# Patient Record
Sex: MALE | Race: WHITE | NOT HISPANIC OR LATINO | Employment: FULL TIME | URBAN - METROPOLITAN AREA
[De-identification: names, ages, dates, MRNs, and addresses within clinical notes are randomized per-mention and may not be internally consistent; named-entity substitution may affect disease eponyms.]

---

## 2017-04-28 ENCOUNTER — TRANSCRIBE ORDERS (OUTPATIENT)
Dept: ADMINISTRATIVE | Facility: HOSPITAL | Age: 27
End: 2017-04-28

## 2017-04-28 DIAGNOSIS — M54.40 ACUTE RIGHT-SIDED LOW BACK PAIN WITH SCIATICA, SCIATICA LATERALITY UNSPECIFIED: Primary | ICD-10-CM

## 2017-05-05 ENCOUNTER — HOSPITAL ENCOUNTER (OUTPATIENT)
Dept: RADIOLOGY | Facility: HOSPITAL | Age: 27
Discharge: HOME | End: 2017-05-05
Payer: COMMERCIAL

## 2017-05-05 DIAGNOSIS — M54.40 ACUTE RIGHT-SIDED LOW BACK PAIN WITH SCIATICA, SCIATICA LATERALITY UNSPECIFIED: ICD-10-CM

## 2017-05-05 PROCEDURE — 72148 MRI LUMBAR SPINE W/O DYE: CPT

## 2017-08-17 ENCOUNTER — APPOINTMENT (EMERGENCY)
Dept: CT IMAGING | Facility: HOSPITAL | Age: 27
End: 2017-08-17
Payer: COMMERCIAL

## 2017-08-17 ENCOUNTER — HOSPITAL ENCOUNTER (EMERGENCY)
Facility: HOSPITAL | Age: 27
Discharge: HOME | End: 2017-08-17
Attending: EMERGENCY MEDICINE
Payer: COMMERCIAL

## 2017-08-17 ENCOUNTER — APPOINTMENT (EMERGENCY)
Dept: RADIOLOGY | Facility: HOSPITAL | Age: 27
End: 2017-08-17
Payer: COMMERCIAL

## 2017-08-17 VITALS
SYSTOLIC BLOOD PRESSURE: 148 MMHG | WEIGHT: 284.17 LBS | TEMPERATURE: 98 F | DIASTOLIC BLOOD PRESSURE: 100 MMHG | OXYGEN SATURATION: 98 % | HEART RATE: 99 BPM | RESPIRATION RATE: 18 BRPM

## 2017-08-17 DIAGNOSIS — S16.1XXA ACUTE STRAIN OF NECK MUSCLE, INITIAL ENCOUNTER: ICD-10-CM

## 2017-08-17 DIAGNOSIS — S39.012A BACK STRAIN, INITIAL ENCOUNTER: ICD-10-CM
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
## 2017-08-17 PROCEDURE — 72100 X-RAY EXAM L-S SPINE 2/3 VWS: CPT

## 2017-08-17 PROCEDURE — 70450 CT HEAD/BRAIN W/O DYE: CPT

## 2017-08-17 PROCEDURE — 72131 CT LUMBAR SPINE W/O DYE: CPT

## 2017-08-17 PROCEDURE — 99284 EMERGENCY DEPT VISIT MOD MDM: CPT

## 2017-08-17 PROCEDURE — 72125 CT NECK SPINE W/O DYE: CPT

## 2017-08-17 RX ORDER — TADALAFIL 2.5 MG/1
2.5 TABLET ORAL DAILY PRN
COMMUNITY
End: 2021-07-22

## 2017-08-17 RX ORDER — OMEGA-3-ACID ETHYL ESTERS 1 G/1
2 CAPSULE, LIQUID FILLED ORAL 2 TIMES DAILY
COMMUNITY
End: 2021-07-22

## 2017-08-17 RX ORDER — MULTIVITAMIN
1 TABLET ORAL DAILY
COMMUNITY

## 2017-08-17 RX ORDER — HYDROCODONE BITARTRATE AND ACETAMINOPHEN 5; 325 MG/1; MG/1
1 TABLET ORAL EVERY 6 HOURS PRN
Qty: 10 TABLET | Refills: 0 | Status: SHIPPED | OUTPATIENT
Start: 2017-08-17 | End: 2021-07-22

## 2017-08-17 RX ORDER — NAPROXEN 500 MG/1
500 TABLET ORAL 2 TIMES DAILY WITH MEALS
Qty: 30 TABLET | Refills: 0 | Status: SHIPPED | OUTPATIENT
Start: 2017-08-17 | End: 2021-07-22

## 2021-06-30 ENCOUNTER — OFFICE VISIT (OUTPATIENT)
Dept: URGENT CARE | Facility: MEDICAL CENTER | Age: 31
End: 2021-06-30
Payer: COMMERCIAL

## 2021-06-30 VITALS — OXYGEN SATURATION: 99 % | HEART RATE: 96 BPM | TEMPERATURE: 97.8 F | RESPIRATION RATE: 18 BRPM

## 2021-06-30 DIAGNOSIS — H66.91 RIGHT OTITIS MEDIA, UNSPECIFIED OTITIS MEDIA TYPE: Primary | ICD-10-CM

## 2021-06-30 PROCEDURE — 99213 OFFICE O/P EST LOW 20 MIN: CPT | Performed by: PHYSICIAN ASSISTANT

## 2021-06-30 RX ORDER — AMOXICILLIN 500 MG/1
500 CAPSULE ORAL EVERY 8 HOURS SCHEDULED
Qty: 21 CAPSULE | Refills: 0 | Status: SHIPPED | OUTPATIENT
Start: 2021-06-30 | End: 2021-07-07

## 2021-06-30 NOTE — PROGRESS NOTES
[facility]        NAME: Conor Marcus is a 33 y o  male  : 1990    MRN: [id number]  DATE: 2021  TIME: 1:49 PM    Assessment and Plan   Right otitis media, unspecified otitis media type [H66 91]  1  Right otitis media, unspecified otitis media type  amoxicillin (AMOXIL) 500 mg capsule         Patient Instructions     Otitis media  Amoxicillin as directed  Follow up with PCP in 3-5 days  Proceed to  ER if symptoms worsen  Chief Complaint     Chief Complaint   Patient presents with    Earache     x2 days rigth ear    Nasal Congestion     x 2 days taking mucinex for same          History of Present Illness       34 y/o female presents c/o congestion, post nasal drip and right ear pain  Denies cough, chest pain, fever, chills      Review of Systems   Review of Systems   Constitutional: Negative  HENT: Positive for congestion and ear pain  Eyes: Negative  Respiratory: Negative  Negative for apnea, cough, choking, chest tightness, shortness of breath, wheezing and stridor  Cardiovascular: Negative  Negative for chest pain           Current Medications       Current Outpatient Medications:     amoxicillin (AMOXIL) 500 mg capsule, Take 1 capsule (500 mg total) by mouth every 8 (eight) hours for 7 days, Disp: 21 capsule, Rfl: 0    HYDROcodone-acetaminophen (NORCO) 5-325 mg per tablet, Take 1 tablet by mouth every 6 (six) hours as needed for pain Max Daily Amount: 4 tablets (Patient not taking: Reported on 2021), Disp: 10 tablet, Rfl: 0    Multiple Vitamin (MULTIVITAMIN) tablet, Take 1 tablet by mouth daily (Patient not taking: Reported on 2021), Disp: , Rfl:     naproxen (NAPROSYN) 500 mg tablet, Take 1 tablet by mouth 2 (two) times a day with meals (Patient not taking: Reported on 2021), Disp: 30 tablet, Rfl: 0    omega-3-acid ethyl esters (LOVAZA) 1 g capsule, Take 2 g by mouth 2 (two) times a day (Patient not taking: Reported on 2021), Disp: , Rfl:    tadalafil (CIALIS) 2 5 MG tablet, Take 2 5 mg by mouth daily as needed for erectile dysfunction (Patient not taking: Reported on 6/30/2021), Disp: , Rfl:     UNKNOWN TO PATIENT, , Disp: , Rfl:     Current Allergies     Allergies as of 06/30/2021    (No Known Allergies)            The following portions of the patient's history were reviewed and updated as appropriate: allergies, current medications, past family history, past medical history, past social history, past surgical history and problem list      Past Medical History:   Diagnosis Date    Back pain     Hyperlipidemia     Sciatica     "left side"       Past Surgical History:   Procedure Laterality Date    NO PAST SURGERIES         No family history on file  Medications have been verified  Objective   Pulse 96   Temp 97 8 °F (36 6 °C)   Resp 18   SpO2 99%        Physical Exam     Physical Exam  Constitutional:       General: He is not in acute distress  Appearance: He is well-developed  He is not diaphoretic  HENT:      Head: Normocephalic and atraumatic  Right Ear: Hearing and external ear normal  Tympanic membrane is erythematous and bulging  Left Ear: Hearing, tympanic membrane, ear canal and external ear normal    Cardiovascular:      Rate and Rhythm: Normal rate and regular rhythm  Heart sounds: Normal heart sounds  Pulmonary:      Effort: Pulmonary effort is normal  No respiratory distress  Breath sounds: Normal breath sounds  No wheezing or rales  Chest:      Chest wall: No tenderness  Musculoskeletal:      Cervical back: Normal range of motion and neck supple  Lymphadenopathy:      Cervical: No cervical adenopathy

## 2021-06-30 NOTE — PATIENT INSTRUCTIONS
Otitis media  Amoxicillin as directed  Follow up with PCP in 3-5 days  Proceed to  ER if symptoms worsen  Ear Infection   WHAT YOU NEED TO KNOW:   An ear infection is also called otitis media  An ear infection may be caused by blocked or swollen eustachian tubes  Eustachian tubes connect the middle ear to the back of the nose and throat  They drain fluid from the middle ear  With an ear infection, fluid builds up and is infected by germs  The germs grow easily in fluid trapped behind the eardrum  DISCHARGE INSTRUCTIONS:   Call 911 or have someone call 911 for the following:   · You have a seizure  Return to the emergency department if:   · You have a fever and a stiff neck  Contact your healthcare provider if:   · Your ear pain gets worse or does not go away, even after treatment  · The outside of your ear is red or swollen  · You are vomiting or have diarrhea  · You have fluid coming from your ear  · You have questions or concerns about your condition or care  Medicines: You may  need any of the following:  · Acetaminophen  decreases pain and fever  It is available without a doctor's order  Ask how much to take and how often to take it  Follow directions  Read the labels of all other medicines you are using to see if they also contain acetaminophen, or ask your doctor or pharmacist  Acetaminophen can cause liver damage if not taken correctly  Do not use more than 4 grams (4,000 milligrams) total of acetaminophen in one day  · NSAIDs , such as ibuprofen, help decrease swelling, pain, and fever  This medicine is available with or without a doctor's order  NSAIDs can cause stomach bleeding or kidney problems in certain people  If you take blood thinner medicine, always ask your healthcare provider if NSAIDs are safe for you  Always read the medicine label and follow directions  · Ear drops  help treat your ear pain      · Antibiotics  help treat a bacterial infection that caused your ear infection  · Take your medicine as directed  Contact your healthcare provider if you think your medicine is not helping or if you have side effects  Tell him or her if you are allergic to any medicine  Keep a list of the medicines, vitamins, and herbs you take  Include the amounts, and when and why you take them  Bring the list or the pill bottles to follow-up visits  Carry your medicine list with you in case of an emergency  Heat or ice:   · Apply heat  on your ear for 15 to 20 minutes, 3 to 4 times a day or as directed  Heat helps decrease pain  · Apply ice  on your ear for 15 to 20 minutes, 3 to 4 times a day for 2 days or as directed  Use an ice pack, or put crushed ice in a plastic bag  Cover it with a towel before you apply it to your ear  Ice decreases swelling and pain  Prevent an ear infection:   · Wash your hands often  Use soap and water  Wash your hands after you use the bathroom, change a child's diapers, or sneeze  Wash your hands before you prepare or eat food  · Stay away from people who are ill  Some germs are easily and quickly spread through contact  Return to work or school: You may return to work or school when your fever is gone  Follow up with your healthcare provider as directed:  Write down your questions so you remember to ask them during your visits  © Copyright [address] Information is for End User's use only and may not be sold, redistributed or otherwise used for commercial purposes  All illustrations and images included in CareNotes® are the copyrighted property of A D A M , Inc  or [facility] Pranay Zuñiga   The above information is an  only  It is not intended as medical advice for individual conditions or treatments  Talk to your doctor, nurse or pharmacist before following any medical regimen to see if it is safe and effective for you

## 2021-07-08 ENCOUNTER — OFFICE VISIT (OUTPATIENT)
Dept: URGENT CARE | Facility: MEDICAL CENTER | Age: 31
End: 2021-07-08
Payer: COMMERCIAL

## 2021-07-08 VITALS
RESPIRATION RATE: 18 BRPM | WEIGHT: 305 LBS | SYSTOLIC BLOOD PRESSURE: 136 MMHG | DIASTOLIC BLOOD PRESSURE: 76 MMHG | OXYGEN SATURATION: 97 % | TEMPERATURE: 98.3 F | HEART RATE: 96 BPM | BODY MASS INDEX: 37.14 KG/M2 | HEIGHT: 76 IN

## 2021-07-08 DIAGNOSIS — H69.81 DYSFUNCTION OF RIGHT EUSTACHIAN TUBE: Primary | ICD-10-CM

## 2021-07-08 DIAGNOSIS — R09.81 NASAL CONGESTION: ICD-10-CM

## 2021-07-08 PROCEDURE — 99213 OFFICE O/P EST LOW 20 MIN: CPT | Performed by: PHYSICIAN ASSISTANT

## 2021-07-08 RX ORDER — MONTELUKAST SODIUM 10 MG/1
10 TABLET ORAL
Qty: 14 TABLET | Refills: 0 | Status: SHIPPED | OUTPATIENT
Start: 2021-07-08 | End: 2021-08-11

## 2021-07-08 NOTE — PATIENT INSTRUCTIONS
1  Increase fluids  2  Tylenol as needed for ear pain  3  Take Singulair 10mg  At night  4   Follow up with PCP in 3-5 days if symptoms persist

## 2021-07-08 NOTE — PROGRESS NOTES
[facility]        NAME: Katalina Sutton is a 33 y o  male  : 1990    MRN: [id number]  DATE: 2021  TIME: 4:41 PM    Assessment and Plan   Dysfunction of right eustachian tube [H69 81]  1  Dysfunction of right eustachian tube  montelukast (SINGULAIR) 10 mg tablet   2  Nasal congestion  montelukast (SINGULAIR) 10 mg tablet         Patient Instructions     1  Increase fluids  2  Tylenol as needed for ear pain  3  Take Singulair 10mg  At night  4  Follow up with PCP in 3-5 days if symptoms persist       Chief Complaint     Chief Complaint   Patient presents with    Earache     b/l ear pain x 1 week          History of Present Illness       Claudette Amato is a 56-year-old male presents with right-sided ear pain and fullness has been present for the past 2 weeks  Patient reports he was seen and evaluated for a right otitis media approximately 10 days prior, he finished the entire course of amoxicillin but still had pressure and sense of fullness in his right ear  Patient denies any change in hearing or ear drainage  He has had no fever since the onset of his most recent symptoms  Review of Systems   Review of Systems   Constitutional: Negative  HENT: Positive for congestion and ear pain  Negative for ear discharge and hearing loss  Respiratory: Negative  Gastrointestinal: Negative            Current Medications       Current Outpatient Medications:     Multiple Vitamin (MULTIVITAMIN) tablet, Take 1 tablet by mouth daily , Disp: , Rfl:     naproxen (NAPROSYN) 500 mg tablet, Take 1 tablet by mouth 2 (two) times a day with meals, Disp: 30 tablet, Rfl: 0    omega-3-acid ethyl esters (LOVAZA) 1 g capsule, Take 2 g by mouth 2 (two) times a day , Disp: , Rfl:     HYDROcodone-acetaminophen (NORCO) 5-325 mg per tablet, Take 1 tablet by mouth every 6 (six) hours as needed for pain Max Daily Amount: 4 tablets (Patient not taking: Reported on 2021), Disp: 10 tablet, Rfl: 0    montelukast (SINGULAIR) 10 mg tablet, Take 1 tablet (10 mg total) by mouth daily at bedtime for 14 days, Disp: 14 tablet, Rfl: 0    tadalafil (CIALIS) 2 5 MG tablet, Take 2 5 mg by mouth daily as needed for erectile dysfunction  (Patient not taking: Reported on 7/8/2021), Disp: , Rfl:     UNKNOWN TO PATIENT, , Disp: , Rfl:     Current Allergies     Allergies as of 07/08/2021    (No Known Allergies)            The following portions of the patient's history were reviewed and updated as appropriate: allergies, current medications, past family history, past medical history, past social history, past surgical history and problem list      Past Medical History:   Diagnosis Date    Back pain     Hyperlipidemia     Sciatica     "left side"       Past Surgical History:   Procedure Laterality Date    NO PAST SURGERIES         History reviewed  No pertinent family history  Medications have been verified  Objective   /76   Pulse 96   Temp 98 3 °F (36 8 °C)   Resp 18   Ht 6' 4" (1 93 m)   Wt (!) 138 kg (305 lb)   SpO2 97%   BMI 37 13 kg/m²   No LMP for male patient  Physical Exam     Physical Exam  Constitutional:       General: He is not in acute distress  Appearance: Normal appearance  He is not ill-appearing  HENT:      Head: Normocephalic and atraumatic  Left Ear: Tympanic membrane and ear canal normal       Ears:        Nose: Mucosal edema and congestion present  No rhinorrhea  Mouth/Throat:      Lips: Pink  Pharynx: Oropharynx is clear  Cardiovascular:      Rate and Rhythm: Normal rate and regular rhythm  Heart sounds: Normal heart sounds  No murmur heard  Pulmonary:      Effort: Pulmonary effort is normal       Breath sounds: Normal breath sounds  Neurological:      Mental Status: He is alert           Discuss patient history and physical exam findings, patient declines use of nasal sprays or prednisone and request "alternative "

## 2021-07-21 NOTE — PROGRESS NOTES
Assessment/Plan:  Problem List Items Addressed This Visit        Other    Hypertriglyceridemia     Pending labs  Recommend lifestyle modifications  Relevant Orders    Lipid panel    TSH, 3rd generation with Free T4 reflex    Class 2 severe obesity with serious comorbidity and body mass index (BMI) of 37 0 to 37 9 in adult [facility])     Recommend lifestyle modifications  Relevant Orders    TSH, 3rd generation with Free T4 reflex    Anxiety       Stable  Patient declines mood medications and counseling  Smart phone elinor list and counseling list provided today  Relevant Orders    CBC and differential    Comprehensive metabolic panel    TSH, 3rd generation with Free T4 reflex      Other Visit Diagnoses     Annual physical exam    -  Primary    Obesity (BMI 35 0-39 9 without comorbidity)        Relevant Orders    TSH, 3rd generation with Free T4 reflex    Encounter for immunization        Need for hepatitis C screening test        Relevant Orders    Hepatitis C antibody    Screening for HIV (human immunodeficiency virus)        Relevant Orders    HIV 1/2 Antigen/Antibody (4th Generation) w Reflex [facility]    Screening for cardiovascular condition        Relevant Orders    CBC and differential    Comprehensive metabolic panel    Lipid panel    LDL cholesterol, direct    BMI 37 0-37 9, adult        Relevant Orders    TSH, 3rd generation with Free T4 reflex    Myalgia        Relevant Orders    Vitamin D 25 hydroxy    Thyroid disorder screen        Relevant Orders    TSH, 3rd generation with Free T4 reflex    Dysfunction of right eustachian tube       Advise Papa med sinus rinse kit, Mucinex, Claritin/Zyrtec/Allegra, Flonase  Avoid decongestants if you have high blood pressure  Return in about 1 year (around 7/22/2022) for Annual physical; P'RN Labs        Future Appointments   Date Time Provider Nora Moody   7/25/2022  3:00 PM Ari Carlin DO [facility] Subjective:     Yumiko Lewis is a 33 y o  male who presents today as a new patient for his medical conditions  New Patient    Previous PCP:  Dr Mccloud Done at [facility] in [place], [place]  Reason for Transfer:  Preference, Girlfriend and Son are patients here  Last seen by previous PCP:  2017  Last Labs:  2018  Last Physical:  DOT   Medical Records Requested:  Yes - Immunizations      HPI:  Chief Complaint   Patient presents with    Annual Exam     blood work      -- Above per clinical staff and reviewed  --      HPI      Today:      Controlled Substance Review    [place] PDMP or [place]  reviewed: No red flags were identified; safe to proceed with prescription  S/p Right OM 3 weeks ago, Rx Amoxicillin  He still feels that his ear is wet  Rx Singulair for possible Eustachian tube dysfunction? Obesity - Not watching diet  No regular exercise  Hypertriglyceridemia - On Fish Oil 1200mg 2 tabs daily  No other Rx previously  Back Pain - Sees Chiro regularly  Smokes marijuana occasionally for pain relief  Hemorrhoids - s/p Colonoscopy in  in [place], [place] ([address] name unknown)  Patient states repeat colonoscopy due at age 37yo  Anxiety  - Feels like he's coping well  Stressed financially, also caring for young son  Sometimes yells  Feels safe at home  Good social supports  No SI/HI/AH/VH  Last counseling 2018 - helpful  No mood meds previously  "I will not even attempt to be on medications for mood "        Reviewed:  Labs - None    Overdue for Dentist   [facility]      PHQ-9 Depression Screening    PHQ-9:   Frequency of the following problems over the past two weeks:      Little interest or pleasure in doing things: 0 - not at all  Feeling down, depressed, or hopeless: 0 - not at all  Trouble falling or staying asleep, or sleeping too much: 0 - not at all  Feeling tired or having little energy: 0 - not at all  Poor appetite or overeatin - not at all  Feeling bad about yourself - or that you are a failure or have let yourself or your family down: 0 - not at all  Trouble concentrating on things, such as reading the newspaper or watching television: 0 - not at all  Moving or speaking so slowly that other people could have noticed  Or the opposite - being so fidgety or restless that you have been moving around a lot more than usual: 0 - not at all  Thoughts that you would be better off dead, or of hurting yourself in some way: 0 - not at all  PHQ-2 Score: 0  PHQ-9 Score: 0         BRUNO-7 Flowsheet Screening      Most Recent Value   Over the last 2 weeks, how often have you been bothered by any of the following problems? Feeling nervous, anxious, or on edge  0   Not being able to stop or control worrying  0   Worrying too much about different things  1   Trouble relaxing  1   Being so restless that it is hard to sit still  0   Becoming easily annoyed or irritable  1   Feeling afraid as if something awful might happen  0   BRUNO-7 Total Score  3          MDQ:  1        Last Tetanus vaccine at [facility] [address]  - 7 in the last 10 years - [id number]  The following portions of the patient's history were reviewed and updated as appropriate: allergies, current medications, past family history, past medical history, past social history, past surgical history and problem list       Review of Systems   Constitutional: Positive for fatigue  Negative for appetite change, chills, diaphoresis and fever  Respiratory: Negative for cough, chest tightness, shortness of breath and wheezing  Cardiovascular: Positive for chest pain (B/L since quitting smoking, improving c time)  Gastrointestinal: Positive for blood in stool (Last occurred 1 week ago, +Hemorrhoids)  Negative for abdominal pain, diarrhea, nausea and vomiting  Genitourinary: Negative for dysuria  Musculoskeletal: Positive for back pain          Current Outpatient Medications   Medication Sig Dispense Refill    montelukast (SINGULAIR) 10 mg tablet Take 1 tablet (10 mg total) by mouth daily at bedtime for 14 days 14 tablet 0    Multiple Vitamin (MULTIVITAMIN) tablet Take 1 tablet by mouth daily       niacin 100 mg tablet Take 100 mg by mouth daily with breakfast      Omega-3 Fatty Acids (Fish Oil) 1200 MG CAPS Take 2 capsules by mouth daily       No current facility-administered medications for this visit  Objective:  /78   Pulse 68   Temp 97 5 °F (36 4 °C)   Resp 14   Ht 6' 4" (1 93 m)   Wt (!) 138 kg (305 lb 3 2 oz)   SpO2 99%   BMI 37 15 kg/m²    Wt Readings from Last 3 Encounters:   07/22/21 (!) 138 kg (305 lb 3 2 oz)   07/08/21 (!) 138 kg (305 lb)   08/17/17 129 kg (284 lb 2 8 oz)      BP Readings from Last 3 Encounters:   07/22/21 120/78   07/08/21 136/76   08/17/17 148/100          Physical Exam  Vitals and nursing note reviewed  Constitutional:       Appearance: Normal appearance  He is well-developed  He is obese  HENT:      Head: Normocephalic and atraumatic  Right Ear: Tympanic membrane, ear canal and external ear normal       Left Ear: Tympanic membrane, ear canal and external ear normal       Nose: Nose normal       Right Sinus: No maxillary sinus tenderness or frontal sinus tenderness  Left Sinus: No maxillary sinus tenderness or frontal sinus tenderness  Mouth/Throat:      Mouth: Mucous membranes are moist       Pharynx: Oropharynx is clear  Uvula midline  Tonsils: No tonsillar exudate  Eyes:      Extraocular Movements: Extraocular movements intact  Conjunctiva/sclera: Conjunctivae normal       Pupils: Pupils are equal, round, and reactive to light  Cardiovascular:      Rate and Rhythm: Normal rate and regular rhythm  Pulses: Normal pulses  Heart sounds: Normal heart sounds  Pulmonary:      Effort: Pulmonary effort is normal       Breath sounds: Normal breath sounds     Abdominal:      General: Bowel sounds are normal  There is no distension  Palpations: Abdomen is soft  There is no mass  Tenderness: There is no abdominal tenderness  There is no guarding or rebound  Musculoskeletal:         General: No swelling or tenderness  Cervical back: Neck supple  Right lower leg: No edema  Left lower leg: No edema  Lymphadenopathy:      Cervical: No cervical adenopathy  Skin:     Findings: No rash  Neurological:      General: No focal deficit present  Mental Status: He is alert and oriented to person, place, and time  Psychiatric:         Mood and Affect: Mood normal          Behavior: Behavior normal          Thought Content: Thought content normal          Judgment: Judgment normal          Lab Results:      No results found for: WBC, HGB, HCT, PLT, CHOL, TRIG, HDL, LDLDIRECT, ALT, AST, NA, K, CL, CREATININE, BUN, CO2, TSH, PSA, INR, GLUF, HGBA1C, MICROALBUR  No results found for: URICACID  Invalid input(s): BASENAME Vitamin D    No results found  POCT Labs      BMI Counseling: Body mass index is 37 15 kg/m²  The BMI is above normal  Nutrition recommendations include encouraging healthy choices of fruits and vegetables  Exercise recommendations include exercising 3-5 times per week  No pharmacotherapy was ordered

## 2021-07-22 ENCOUNTER — OFFICE VISIT (OUTPATIENT)
Dept: FAMILY MEDICINE CLINIC | Facility: CLINIC | Age: 31
End: 2021-07-22
Payer: COMMERCIAL

## 2021-07-22 VITALS
TEMPERATURE: 97.5 F | SYSTOLIC BLOOD PRESSURE: 120 MMHG | BODY MASS INDEX: 37.17 KG/M2 | OXYGEN SATURATION: 99 % | HEART RATE: 68 BPM | RESPIRATION RATE: 14 BRPM | WEIGHT: 305.2 LBS | HEIGHT: 76 IN | DIASTOLIC BLOOD PRESSURE: 78 MMHG

## 2021-07-22 DIAGNOSIS — E78.1 HYPERTRIGLYCERIDEMIA: ICD-10-CM

## 2021-07-22 DIAGNOSIS — M79.10 MYALGIA: ICD-10-CM

## 2021-07-22 DIAGNOSIS — Z11.4 SCREENING FOR HIV (HUMAN IMMUNODEFICIENCY VIRUS): ICD-10-CM

## 2021-07-22 DIAGNOSIS — Z11.59 NEED FOR HEPATITIS C SCREENING TEST: ICD-10-CM

## 2021-07-22 DIAGNOSIS — Z13.29 THYROID DISORDER SCREEN: ICD-10-CM

## 2021-07-22 DIAGNOSIS — E66.9 OBESITY (BMI 35.0-39.9 WITHOUT COMORBIDITY): ICD-10-CM

## 2021-07-22 DIAGNOSIS — F41.9 ANXIETY: ICD-10-CM

## 2021-07-22 DIAGNOSIS — Z00.00 ANNUAL PHYSICAL EXAM: Primary | ICD-10-CM

## 2021-07-22 DIAGNOSIS — Z23 ENCOUNTER FOR IMMUNIZATION: ICD-10-CM

## 2021-07-22 DIAGNOSIS — Z13.6 SCREENING FOR CARDIOVASCULAR CONDITION: ICD-10-CM

## 2021-07-22 DIAGNOSIS — E66.01 CLASS 2 SEVERE OBESITY WITH SERIOUS COMORBIDITY AND BODY MASS INDEX (BMI) OF 37.0 TO 37.9 IN ADULT, UNSPECIFIED OBESITY TYPE (HCC): ICD-10-CM

## 2021-07-22 DIAGNOSIS — H69.81 DYSFUNCTION OF RIGHT EUSTACHIAN TUBE: ICD-10-CM

## 2021-07-22 PROCEDURE — 3008F BODY MASS INDEX DOCD: CPT | Performed by: FAMILY MEDICINE

## 2021-07-22 PROCEDURE — 1036F TOBACCO NON-USER: CPT | Performed by: FAMILY MEDICINE

## 2021-07-22 PROCEDURE — 99385 PREV VISIT NEW AGE 18-39: CPT | Performed by: FAMILY MEDICINE

## 2021-07-22 PROCEDURE — 3725F SCREEN DEPRESSION PERFORMED: CPT | Performed by: FAMILY MEDICINE

## 2021-07-22 RX ORDER — AMOXICILLIN 500 MG
2 CAPSULE ORAL 2 TIMES DAILY
COMMUNITY
End: 2021-10-01

## 2021-07-22 RX ORDER — NIACIN 100 MG
250 TABLET ORAL
COMMUNITY
End: 2021-10-01

## 2021-07-22 NOTE — PATIENT INSTRUCTIONS
Please contact your insurance if you are uncertain of coverage for plan of care items  Your insurance may not cover the cost of your Vitamin D blood test, which is approximately $65-70  Please notify the lab prior to blood draw if you would like to decline this test       Here is a web site to go to for information on providers who certify medical marijuana:  [email or web address]    M  Pham Malagon MD - [facility], [address] [facility], [place], [address]   800 [facility], [address]   800 [facility], [facility]  [place], [address]    Refer to the back of insurance card for counseling options  Apps and Websites for Counseling, Anxiety/Depression, Chronic Pain, Lifestyle Change, Problem-solving, Self-Esteem, Anger Management, Coping with Uncertainty    (Prices current as of 9/5/19)    1  Mood Kit - Available in Apple Sharita Store for $4 99  Supports a person's success in specific situations, includes thought , mood tracker, and journal   Can be accessed in an unstructured way and used as an unguided self-help sharita  2   Moodnotes - Available in [employer] Shar[employer] Store for $4 99  Focuses on healthier thinking habits and identifying "traps" in thinking  Tracks mood over a period of time and identifies factors that influence mood  3   MoodMission - Available in [facility] for free  Includes five "missions" to promote confidence in handling stressors and coping in specific situations  The sharita personalizes its style and techniques based on when the user engages it most frequently  In-sharita rewards are used to motivate, increase fun and self-confidence  Helpful for patients who need improvement in mood or a decrease in anxiety and depression symptoms  4    What's Up - Available in Share Some Style for free    Recognizes negative thinking patterns and methods to overcome them  Uses helpful metaphors, a catastrophe scale, grounding techniques, and breathing exercises  Has the ability to sync data across multiple devices and protect this information with a unique pass code  Has the capability to be active in forums where people can discuss similar feelings and strategies that have been helpful  5   Moodpath - Available in [employer] for free  Uses daily screenings to create a better understanding of thoughts, feelings, and emotions  When needed, it provides a discussion guide to talking with a medical professional based on the responses to daily screenings  Includes over 150 psychological exercises and videos to promote and strengthen overall mental health  6   MindShift - Available in [employer] for [address] [employer]  Helpful for youth and young adults in coping with anxiety  Creates an individualized toolbox to help users deal with test anxiety, perfectionism, social anxiety, worry, panic, and conflict  Includes directions on how to construct belief experiments to trial common beliefs that feed anxiety, guided relaxation, as well as tools and tips to help establish and accomplish goals  Differentiates between helpful and unhelpful anxiety, and explains how to overcome fears by gradually facing them in manageable steps  7   CBT-I  - Available in [employer] for free  For insomnia  Educates about sleep, developing positive sleep routines, and improved sleep environment  Encourages user to change behaviors which will provide confidence for better sleep on a regular basis  8   [employer]elfhelp  co  - Free website  Provides self-help and therapy resources that encourages change to combat negative and destructive thought patterns    Includes access to numerous handouts on a variety of symptoms related to anxiety, depression, low self-esteem, panic attacks, social disorders, and more   The solution section has interventions that can be printed and saved for future use  9   Woebot - Available in [employer] and [employer] for free  Recommended for age 16+  Friendly self-care  that helps you think through situations with step-by-step guidance using counseling tools, learn about yourself with intelligent mood tracking, and master skills to reduce stress and live happier through 100+ evidence-based stories from clinicians  Chat as often or as little as you'd like, whenever you'd like to  10   Giorgi DILL  CBT DBT Chatbot - Available in Apple appsstore and Google Play for free, has in-sharita purchases  Recommended for 12+  Psychologist support at $30/month, 50% off to start  April Nathan / Sergio Duron is free  Uses techniques of CBT, DBT, yoga, and meditation to support your needs regarding stress, anxiety, sleep, loss, and a full range of other mental health and wellness issues  11   Curable Pain Relief - Available in Apple Sharita store and Google Play for free, has in-sharita purchases  Teaches about the chronic pain cycle and how to reverse it, while retraining your brain and nervous system for long-term results  Smart  Nely guides user through updates in pain science to identify, target, eliminate the factors that are keeping user "stuck" in the pain cycle  12   Talkspace Online Therapy - Available in Apple Sharita store and Google Play for a fee  Subscription service, starting at $59/week (billed monthly)  All plans include unlimited messaging, and add live video sessions if desired  Unlimited messaging therapy for teens ages 15-14 and special services for couples therapy  You can change therapists or stop subscription renewal at any time  Recommended for 13+  User is matched with a licensed therapist in your state and communicate on your device by text, audio, and video from anywhere, at any time  User will hear back at least once a day, 5 days per week  Counseling services:    [facility]   [address], [address] , [place], [address] (they have equine therapy too)  8 [facility], [facility],  [place], 120 [facility] 242, [address],  [address], [address]    70 [facility], [address] [place] [address]   [phone]     1234 [facility]  200 [facility], [address]  [facility] 49    162 [facility]   [address], [place], [address]     1200 [facility]   2102 [facility], [facility] [address] [place] [address]     [facility], [facility], [facility]  (patients age 11-adult)   [address], 243 Health system, [address], [address]   [address]  1000 [facility], 1777 [facility] [address] [place] [address], [facility], [place], [address]  [address] [facility] 108 (specializing in addiction)  [facility], [address]  1441 [facility]  291 [facility], [place], 6100 [facility]   [place]      [address], 403 [facility] , [address], [place], [address] [facility] [place], [address] [facility] [address], 2121 [facility]  100 [facility], [address], [place], [address]  [address] [address], 2601 [facility], [address]   [phone]    Norm Fails, [address]  [place] [address] [place] [address], [place] [address] [phone]      Hotlines:   Please program these numbers into your phone in case you or someone you know needs them  All services are free  WarmLine:  637.314.4934 or [phone]   They provide a supportive listening ear if you need it  They also can also provide information about mental health concerns and services     Crisis Line:  [facility] [phone],  [facility] [phone], 250 [facility], [place]: [phone]   24/7 crisis counseling, on-site counseling and walk-in counseling services available  National Suicide Prevention Lifeline:  3-155-537-434.508.5795  En 1200 [facility] [phone]   This is free, confidential, and available 24/7  [place]: [phone]   For those facing or having survived abuse 24 hour confidential help line, emergency safe house, counseling, advocacy and education  Crisis Text Line: text 906961     You are connected to a Crisis Counselor to bring a hot moment to a cool calm through active listening and collaborative problem solving  If you or someone your know are feeling as though you are going to hurt yourself, do not wait - GET HELP RIGHT AWAY  Go to the closest Emergency Room, call 911, or call someone you trust, family member or friend to be with you until you can get some help  Amanuel Dill, -       Thank you for notifying us regarding your interest in the Covid-19 vaccine  At this time, until vaccine supply ramps up, [facility] is currently only scheduling vaccines for healthcare workers, first responders and individuals ages 76 and older to ensure we have adequate vaccine supply for these high-risk groups  In an effort to give you the tools to help you, your family, and loved ones who are asking for this information, and/or seeking assistance, we highly encourage you to take either step below to help pre-register for the Covid-19 vaccine:     1  If you do not have a Ipsum account, visit [facility]/vaccine and sign-up, and then complete the brief questionnaire within [address]  Anyone, of any age, may pre-register on Ipsum  OR    2  If you are unable to use Ipsum, please call [phone], option [phone], and follow the prompts    Note: Due to high call volumes, we are hoping to reserve the use of the call-in option for individuals 75+ at this time   Please encourage your loved ones to use MyChart  Need further assistance? Visit:  Odell pizano       Thank you,       95 [facility] Visit for Adults   AMBULATORY CARE:   A wellness visit  is when you see your healthcare provider to get screened for health problems  Your healthcare provider will also give you advice on how to stay healthy  Write down your questions so you remember to ask them  Ask your healthcare provider how often you should have a wellness visit  What happens at a wellness visit:  Your healthcare provider will ask about your health, and your family history of health problems  This includes high blood pressure, heart disease, and cancer  He or she will ask if you have symptoms that concern you, if you smoke, and about your mood  You may also be asked about your intake of medicines, supplements, food, and alcohol  Any of the following may be done:  · Your weight  will be checked  Your height may also be checked so your body mass index (BMI) can be calculated  Your BMI shows if you are at a healthy weight  · Your blood pressure  and heart rate will be checked  Your temperature may also be checked  · Blood and urine tests  may be done  Blood tests may be done to check your cholesterol levels  Abnormal cholesterol levels increase your risk for heart disease and stroke  You may also need a blood or urine test to check for diabetes if you are at increased risk  Urine tests may be done to look for signs of an infection or kidney disease  · A physical exam  includes checking your heartbeat and lungs with a stethoscope  Your healthcare provider may also check your skin to look for sun damage  · Screening tests  may be recommended  A screening test is done to check for diseases that may not cause symptoms   The screening tests you may need depend on your age, gender, family history, and lifestyle habits  For example, colorectal screening may be recommended if you are 48years old or older  Screening tests you need if you are a woman:   · A Pap smear  is used to screen for cervical cancer  Pap smears are usually done every 3 to 5 years depending on your age  You may need them more often if you have had abnormal Pap smear test results in the past  Ask your healthcare provider how often you should have a Pap smear  · A mammogram  is an x-ray of your breasts to screen for breast cancer  Experts recommend mammograms every 2 years starting at age 48 years  You may need a mammogram at age 52 years or younger if you have an increased risk for breast cancer  Talk to your healthcare provider about when you should start having mammograms and how often you need them  Vaccines you may need:   · Get an influenza vaccine  every year  The influenza vaccine protects you from the flu  Several types of viruses cause the flu  The viruses change over time, so new vaccines are made each year  · Get a tetanus-diphtheria (Td) booster vaccine  every 10 years  This vaccine protects you against tetanus and diphtheria  Tetanus is a severe infection that may cause painful muscle spasms and lockjaw  Diphtheria is a severe bacterial infection that causes a thick covering in the back of your mouth and throat  · Get a human papillomavirus (HPV) vaccine  if you are female and aged 23 to 32 or male 23 to 24 and never received it  This vaccine protects you from HPV infection  HPV is the most common infection spread by sexual contact  HPV may also cause vaginal, penile, and anal cancers  · Get a pneumococcal vaccine  if you are aged 72 years or older  The pneumococcal vaccine is an injection given to protect you from pneumococcal disease  Pneumococcal disease is an infection caused by pneumococcal bacteria  The infection may cause pneumonia, meningitis, or an ear infection      · Get a shingles vaccine  if you are 61 or older, even if you have had shingles before  The shingles vaccine is an injection to protect you from the varicella-zoster virus  This is the same virus that causes chickenpox  Shingles is a painful rash that develops in people who had chickenpox or have been exposed to the virus  How to eat healthy:  My Plate is a model for planning healthy meals  It shows the types and amounts of foods that should go on your plate  Fruits and vegetables make up about half of your plate, and grains and protein make up the other half  A serving of dairy is included on the side of your plate  The amount of calories and serving sizes you need depends on your age, gender, weight, and height  Examples of healthy foods are listed below:  · Eat a variety of vegetables  such as dark green, red, and orange vegetables  You can also include canned vegetables low in sodium (salt) and frozen vegetables without added butter or sauces  · Eat a variety of fresh fruits , canned fruit in 100% juice, frozen fruit, and dried fruit  · Include whole grains  At least half of the grains you eat should be whole grains  Examples include whole-wheat bread, wheat pasta, brown rice, and whole-grain cereals such as oatmeal     · Eat a variety of protein foods such as seafood (fish and shellfish), lean meat, and poultry without skin (turkey and chicken)  Examples of lean meats include pork leg, shoulder, or tenderloin, and beef round, sirloin, tenderloin, and extra lean ground beef  Other protein foods include eggs and egg substitutes, beans, peas, soy products, nuts, and seeds  · Choose low-fat dairy products such as skim or 1% milk or low-fat yogurt, cheese, and cottage cheese  · Limit unhealthy fats  such as butter, hard margarine, and shortening  Exercise:  Exercise at least 30 minutes per day on most days of the week  Some examples of exercise include walking, biking, dancing, and swimming   You can also fit in more physical activity by taking the stairs instead of the elevator or parking farther away from stores  Include muscle strengthening activities 2 days each week  Regular exercise provides many health benefits  It helps you manage your weight, and decreases your risk for type 2 diabetes, heart disease, stroke, and high blood pressure  Exercise can also help improve your mood  Ask your healthcare provider about the best exercise plan for you  General health and safety guidelines:   · Do not smoke  Nicotine and other chemicals in cigarettes and cigars can cause lung damage  Ask your healthcare provider for information if you currently smoke and need help to quit  E-cigarettes or smokeless tobacco still contain nicotine  Talk to your healthcare provider before you use these products  · Limit alcohol  A drink of alcohol is 12 ounces of beer, 5 ounces of wine, or 1½ ounces of liquor  · Lose weight, if needed  Being overweight increases your risk of certain health conditions  These include heart disease, high blood pressure, type 2 diabetes, and certain types of cancer  · Protect your skin  Do not sunbathe or use tanning beds  Use sunscreen with a SPF 15 or higher  Apply sunscreen at least 15 minutes before you go outside  Reapply sunscreen every 2 hours  Wear protective clothing, hats, and sunglasses when you are outside  · Drive safely  Always wear your seatbelt  Make sure everyone in your car wears a seatbelt  A seatbelt can save your life if you are in an accident  Do not use your cell phone when you are driving  This could distract you and cause an accident  Pull over if you need to make a call or send a text message  · Practice safe sex  Use latex condoms if are sexually active and have more than one partner  Your healthcare provider may recommend screening tests for sexually transmitted infections (STIs)  · Wear helmets, lifejackets, and protective gear    Always wear a helmet when you ride a bike or motorcycle, go skiing, or play sports that could cause a head injury  Wear protective equipment when you play sports  Wear a lifejacket when you are on a boat or doing water sports  © Copyright [employer] 2021 Information is for End User's use only and may not be sold, redistributed or otherwise used for commercial purposes  All illustrations and images included in CareNotes® are the copyrighted property of A D A M , Inc  or Dex Zuñiga   The above information is an  only  It is not intended as medical advice for individual conditions or treatments  Talk to your doctor, nurse or pharmacist before following any medical regimen to see if it is safe and effective for you  Obesity   AMBULATORY CARE:   Obesity  is when your body mass index (BMI) is greater than 30  Your healthcare provider will use your height and weight to measure your BMI  The risks of obesity include  many health problems, such as injuries or physical disability  You may need tests to check for the following:  · Diabetes    · High blood pressure or high cholesterol    · Heart disease    · Gallbladder or liver disease    · Cancer of the colon, breast, prostate, liver, or kidney    · Sleep apnea    · Arthritis or gout    Seek care immediately if:   · You have a severe headache, confusion, or difficulty speaking  · You have weakness on one side of your body  · You have chest pain, sweating, or shortness of breath  Contact your healthcare provider if:   · You have symptoms of gallbladder or liver disease, such as pain in your upper abdomen  · You have knee or hip pain and discomfort while walking  · You have symptoms of diabetes, such as intense hunger and thirst, and frequent urination  · You have symptoms of sleep apnea, such as snoring or daytime sleepiness  · You have questions or concerns about your condition or care  Treatment for obesity  focuses on helping you lose weight to improve your health   Even a small decrease in BMI can reduce the risk for many health problems  Your healthcare provider will help you set a weight-loss goal   · Lifestyle changes  are the first step in treating obesity  These include making healthy food choices and getting regular physical activity  Your healthcare provider may suggest a weight-loss program that involves coaching, education, and therapy  · Medicine  may help you lose weight when it is used with a healthy diet and physical activity  · Surgery  can help you lose weight if you are very obese and have other health problems  There are several types of weight-loss surgery  Ask your healthcare provider for more information  Be successful losing weight:   · Set small, realistic goals  An example of a small goal is to walk for 20 minutes 5 days a week  Anther goal is to lose 5% of your body weight  · Tell friends, family members, and coworkers about your goals  and ask for their support  Ask a friend to lose weight with you, or join a weight-loss support group  · Identify foods or triggers that may cause you to overeat , and find ways to avoid them  Remove tempting high-calorie foods from your home and workplace  Place a bowl of fresh fruit on your kitchen counter  If stress causes you to eat, then find other ways to cope with stress  · Keep a diary to track what you eat and drink  Also write down how many minutes of physical activity you do each day  Weigh yourself once a week and record it in your diary  Eating changes: You will need to eat 500 to 1,000 fewer calories each day than you currently eat to lose 1 to 2 pounds a week  The following changes will help you cut calories:  · Eat smaller portions  Use small plates, no larger than 9 inches in diameter  Fill your plate half full of fruits and vegetables  Measure your food using measuring cups until you know what a serving size looks like  · Eat 3 meals and 1 or 2 snacks each day    Plan your meals in advance  [place] and eat at home most of the time  Eat slowly  Do not skip meals  Skipping meals can lead to overeating later in the day  This can make it harder for you to lose weight  Talk with a dietitian to help you make a meal plan and schedule that is right for you  · Eat fruits and vegetables at every meal   They are low in calories and high in fiber, which makes you feel full  Do not add butter, margarine, or cream sauce to vegetables  Use herbs to season steamed vegetables  · Eat less fat and fewer fried foods  Eat more baked or grilled chicken and fish  These protein sources are lower in calories and fat than red meat  Limit fast food  Dress your salads with olive oil and vinegar instead of bottled dressing  · Limit the amount of sugar you eat  Do not drink sugary beverages  Limit alcohol  Activity changes:  Physical activity is good for your body in many ways  It helps you burn calories and build strong muscles  It decreases stress and depression, and improves your mood  It can also help you sleep better  Talk to your healthcare provider before you begin an exercise program   · Exercise for at least 30 minutes 5 days a week  Start slowly  Set aside time each day for physical activity that you enjoy and that is convenient for you  It is best to do both weight training and an activity that increases your heart rate, such as walking, bicycling, or swimming  · Find ways to be more active  Do yard work and housecleaning  Walk up the stairs instead of using elevators  Spend your leisure time going to events that require walking, such as outdoor festivals or fairs  This extra physical activity can help you lose weight and keep it off  Follow up with your healthcare provider as directed: You may need to meet with a dietitian  Write down your questions so you remember to ask them during your visits    © Copyright [employer] 2021 Information is for End User's use only and may not be sold, redistributed or otherwise used for commercial purposes  All illustrations and images included in CareNotes® are the copyrighted property of A D A M , Inc  or [employer] [place]  The above information is an  only  It is not intended as medical advice for individual conditions or treatments  Talk to your doctor, nurse or pharmacist before following any medical regimen to see if it is safe and effective for you  Cholesterol and Your Health   AMBULATORY CARE:   Cholesterol  is a waxy, fat-like substance  Your body uses cholesterol to make hormones and new cells, and to protect nerves  Cholesterol is made by your body  It also comes from certain foods you eat, such as meat and dairy products  Your healthcare provider can help you set goals for your cholesterol levels  He or she can help you create a plan to meet your goals  Cholesterol level goals: Your cholesterol level goals depend on your risk for heart disease, your age, and your other health conditions  The following are general guidelines:  · Total cholesterol  includes low-density lipoprotein (LDL), high-density lipoprotein (HDL), and triglyceride levels  The total cholesterol level should be lower than 200 mg/dL and is best at about 150 mg/dL  · LDL cholesterol  is called bad cholesterol  because it forms plaque in your arteries  As plaque builds up, your arteries become narrow, and less blood flows through  When plaque decreases blood flow to your heart, you may have chest pain  If plaque completely blocks an artery that brings blood to your heart, you may have a heart attack  Plaque can break off and form blood clots  Blood clots may block arteries in your brain and cause a stroke  The level should be less than 130 mg/dL and is best at about 100 mg/dL  · HDL cholesterol  is called good cholesterol  because it helps remove LDL cholesterol from your arteries   It does this by attaching to LDL cholesterol and carrying it to your liver  Your liver breaks down LDL cholesterol so your body can get rid of it  High levels of HDL cholesterol can help prevent a heart attack and stroke  Low levels of HDL cholesterol can increase your risk for heart disease, heart attack, and stroke  The level should be 60 mg/dL or higher  · Triglycerides  are a type of fat that store energy from foods you eat  High levels of triglycerides also cause plaque buildup  This can increase your risk for a heart attack or stroke  If your triglyceride level is high, your LDL cholesterol level may also be high  The level should be less than 150 mg/dL  Any of the following can increase your risk for high cholesterol:   · Smoking cigarettes    · Being overweight or obese, or not getting enough exercise    · Drinking large amounts of alcohol    · A medical condition such as hypertension (high blood pressure) or diabetes    · Certain genes passed from your parents to you    · Age older than 65 years    What you need to know about having your cholesterol levels checked: Adults 21to 39years of age should have their cholesterol levels checked every 4 to 6 years  Adults 45 years or older should have their cholesterol checked every 1 to 2 years  You may need your cholesterol checked more often, or at a younger age, if you have risk factors for heart disease  You may also need to have your cholesterol checked more often if you have other health conditions, such as diabetes  Blood tests are used to check cholesterol levels  Blood tests measure your levels of triglycerides, LDL cholesterol, and HDL cholesterol  How healthy fats affect your cholesterol levels:  Healthy fats, also called unsaturated fats, help lower LDL cholesterol and triglyceride levels  Healthy fats include the following:  · Monounsaturated fats  are found in foods such as olive oil, canola oil, avocado, nuts, and olives      · Polyunsaturated fats,  such as omega 3 fats, are found in fish, such as salmon, trout, and tuna  They can also be found in plant foods such as flaxseed, walnuts, and soybeans  How unhealthy fats affect your cholesterol levels:  Unhealthy fats increase LDL cholesterol and triglyceride levels  They are found in foods high in cholesterol, saturated fat, and trans fat:  · Cholesterol  is found in eggs, dairy, and meat  · Saturated fat  is found in butter, cheese, ice cream, whole milk, and coconut oil  Saturated fat is also found in meat, such as sausage, hot dogs, and bologna  · Trans fat  is found in liquid oils and is used in fried and baked foods  Foods that contain trans fats include chips, crackers, muffins, sweet rolls, microwave popcorn, and cookies  Treatment  for high cholesterol will also decrease your risk of heart disease, heart attack, and stroke  Treatment may include any of the following:  · Lifestyle changes  may include food, exercise, weight loss, and quitting smoking  You may also need to decrease the amount of alcohol you drink  Your healthcare provider will want you to start with lifestyle changes  Other treatment may be added if lifestyle changes are not enough  Your healthcare provider may recommend you work with a team to manage hyperlipidemia  The team may include medical experts such as a dietitian, an exercise or physical therapist, and a behavior therapist  Your family members may be included in helping you create lifestyle changes  · Medicines  may be given to lower your LDL cholesterol, triglyceride levels, or total cholesterol level  You may need medicines to lower your cholesterol if any of the following is true:    ? You have a history of stroke, TIA, unstable angina, or a heart attack  ? Your LDL cholesterol level is 190 mg/dL or higher  ? You are age 36 to 76 years, have diabetes or heart disease risk factors, and your LDL cholesterol is 70 mg/dL or higher  · Supplements  include fish oil, red yeast rice, and garlic   Fish oil may help lower your triglyceride and LDL cholesterol levels  It may also increase your HDL cholesterol level  Red yeast rice may help decrease your total cholesterol level and LDL cholesterol level  Garlic may help lower your total cholesterol level  Do not take any supplements without talking to your healthcare provider  Food changes you can make to lower your cholesterol levels:  A dietitian can help you create a healthy eating plan  He or she can show you how to read food labels and choose foods low in saturated fat, trans fats, and cholesterol  · Decrease the total amount of fat you eat  Choose lean meats, fat-free or 1% fat milk, and low-fat dairy products, such as yogurt and cheese  Try to limit or avoid red meats  Limit or do not eat fried foods or baked goods, such as cookies  · Replace unhealthy fats with healthy fats  Cook foods in olive oil or canola oil  Choose soft margarines that are low in saturated fat and trans fat  Seeds, nuts, and avocados are other examples of healthy fats  · Eat foods with omega-3 fats  Examples include salmon, tuna, mackerel, walnuts, and flaxseed  Eat fish 2 times per week  Pregnant women should not eat fish that have high levels of mercury, such as shark, swordfish, and lyndon mackerel  · Increase the amount of high-fiber foods you eat  High-fiber foods can help lower your LDL cholesterol  Aim to get between 20 and 30 grams of fiber each day  Fruits and vegetables are high in fiber  Eat at least 5 servings each day  Other high-fiber foods are whole-grain or whole-wheat breads, pastas, or cereals, and brown rice  Eat 3 ounces of whole-grain foods each day  Increase fiber slowly  You may have abdominal discomfort, bloating, and gas if you add fiber to your diet too quickly  · Eat healthy protein foods  Examples include low-fat dairy products, skinless chicken and turkey, fish, and nuts  · Limit foods and drinks that are high in sugar    Your dietitian or healthcare provider can help you create daily limits for high-sugar foods and drinks  The limit may be lower if you have diabetes or another health condition  Limits can also help you lose weight if needed  Lifestyle changes you can make to lower your cholesterol levels:   · Maintain a healthy weight  Ask your healthcare provider what a healthy weight is for you  Ask him or her to help you create a weight loss plan if needed  Weight loss can decrease your total cholesterol and triglyceride levels  Weight loss may also help keep your blood pressure at a healthy level  · Be physically active throughout the day  Physical activity, such as exercise, can help lower your total cholesterol level and maintain a healthy weight  Physical activity can also help increase your HDL cholesterol level  Work with your healthcare provider to create an program that is right for you  Get at least 30 to 40 minutes of moderate physical activity most days of the week  Examples of exercise include brisk walking, swimming, or biking  Also include strength training at least 2 times each week  Your healthcare providers can help you create a physical activity plan  · Do not smoke  Nicotine and other chemicals in cigarettes and cigars can raise your cholesterol levels  Ask your healthcare provider for information if you currently smoke and need help to quit  E-cigarettes or smokeless tobacco still contain nicotine  Talk to your healthcare provider before you use these products  · Limit or do not drink alcohol  Alcohol can increase your triglyceride levels  Ask your healthcare provider before you drink alcohol  Ask how much is okay for you to drink in 24 hours or 1 week  Follow up with your doctor as directed:  Write down your questions so you remember to ask them during your visits    © Copyright [employer] 2021 Information is for End User's use only and may not be sold, redistributed or otherwise used for commercial purposes  All illustrations and images included in CareNotes® are the copyrighted property of [employer] A M , Inc  or [facility] Pranay Zuñiga   The above information is an  only  It is not intended as medical advice for individual conditions or treatments  Talk to your doctor, nurse or pharmacist before following any medical regimen to see if it is safe and effective for you  Advise Omega Minjojoe med sinus rinse kit, Mucinex, Claritin/Zyrtec/Allegra, Flonase  Avoid decongestants if you have high blood pressure

## 2021-07-22 NOTE — ASSESSMENT & PLAN NOTE
Stable  Patient declines mood medications and counseling  Smart phone elinor list and counseling list provided today

## 2021-07-30 ENCOUNTER — LAB (OUTPATIENT)
Dept: LAB | Facility: CLINIC | Age: 31
End: 2021-07-30
Payer: COMMERCIAL

## 2021-07-30 DIAGNOSIS — M79.10 MYALGIA: ICD-10-CM

## 2021-07-30 DIAGNOSIS — Z13.29 THYROID DISORDER SCREEN: ICD-10-CM

## 2021-07-30 DIAGNOSIS — E66.01 CLASS 2 SEVERE OBESITY WITH SERIOUS COMORBIDITY AND BODY MASS INDEX (BMI) OF 37.0 TO 37.9 IN ADULT, UNSPECIFIED OBESITY TYPE (HCC): ICD-10-CM

## 2021-07-30 DIAGNOSIS — Z13.6 SCREENING FOR CARDIOVASCULAR CONDITION: ICD-10-CM

## 2021-07-30 DIAGNOSIS — F41.9 ANXIETY: ICD-10-CM

## 2021-07-30 DIAGNOSIS — Z11.59 NEED FOR HEPATITIS C SCREENING TEST: ICD-10-CM

## 2021-07-30 DIAGNOSIS — E66.9 OBESITY (BMI 35.0-39.9 WITHOUT COMORBIDITY): ICD-10-CM

## 2021-07-30 DIAGNOSIS — Z11.4 SCREENING FOR HIV (HUMAN IMMUNODEFICIENCY VIRUS): ICD-10-CM

## 2021-07-30 DIAGNOSIS — E78.1 HYPERTRIGLYCERIDEMIA: ICD-10-CM

## 2021-07-30 LAB
25(OH)D3 SERPL-MCNC: 18.1 NG/ML (ref 30–100)
ALBUMIN SERPL BCP-MCNC: 3.8 G/DL (ref 3.5–5)
ALP SERPL-CCNC: 68 U/L (ref 46–116)
ALT SERPL W P-5'-P-CCNC: 35 U/L (ref 12–78)
ANION GAP SERPL CALCULATED.3IONS-SCNC: 13 MMOL/L (ref 4–13)
AST SERPL W P-5'-P-CCNC: 42 U/L (ref 5–45)
BASOPHILS # BLD AUTO: 0.03 THOUSANDS/ΜL (ref 0–0.1)
BASOPHILS NFR BLD AUTO: 1 % (ref 0–1)
BILIRUB SERPL-MCNC: 0.81 MG/DL (ref 0.2–1)
BUN SERPL-MCNC: 12 MG/DL (ref 5–25)
CALCIUM SERPL-MCNC: 8.4 MG/DL (ref 8.3–10.1)
CHLORIDE SERPL-SCNC: 102 MMOL/L (ref 100–108)
CHOLEST SERPL-MCNC: 289 MG/DL (ref 50–200)
CO2 SERPL-SCNC: 23 MMOL/L (ref 21–32)
CREAT SERPL-MCNC: 0.7 MG/DL (ref 0.6–1.3)
EOSINOPHIL # BLD AUTO: 0.44 THOUSAND/ΜL (ref 0–0.61)
EOSINOPHIL NFR BLD AUTO: 9 % (ref 0–6)
ERYTHROCYTE [DISTWIDTH] IN BLOOD BY AUTOMATED COUNT: 11.9 % (ref 11.6–15.1)
GFR SERPL CREATININE-BSD FRML MDRD: 124 ML/MIN/1.73SQ M
GLUCOSE P FAST SERPL-MCNC: 110 MG/DL (ref 65–99)
HCT VFR BLD AUTO: 43.9 % (ref 36.5–49.3)
HCV AB SER QL: NORMAL
HDLC SERPL-MCNC: 32 MG/DL
HGB BLD-MCNC: 15.1 G/DL (ref 12–17)
IMM GRANULOCYTES # BLD AUTO: 0.01 THOUSAND/UL (ref 0–0.2)
IMM GRANULOCYTES NFR BLD AUTO: 0 % (ref 0–2)
LDLC SERPL DIRECT ASSAY-MCNC: 227 MG/DL (ref 0–100)
LYMPHOCYTES # BLD AUTO: 1.61 THOUSANDS/ΜL (ref 0.6–4.47)
LYMPHOCYTES NFR BLD AUTO: 33 % (ref 14–44)
MCH RBC QN AUTO: 30.6 PG (ref 26.8–34.3)
MCHC RBC AUTO-ENTMCNC: 34.4 G/DL (ref 31.4–37.4)
MCV RBC AUTO: 89 FL (ref 82–98)
MONOCYTES # BLD AUTO: 0.44 THOUSAND/ΜL (ref 0.17–1.22)
MONOCYTES NFR BLD AUTO: 9 % (ref 4–12)
NEUTROPHILS # BLD AUTO: 2.37 THOUSANDS/ΜL (ref 1.85–7.62)
NEUTS SEG NFR BLD AUTO: 48 % (ref 43–75)
NONHDLC SERPL-MCNC: 257 MG/DL
NRBC BLD AUTO-RTO: 0 /100 WBCS
PLATELET # BLD AUTO: 242 THOUSANDS/UL (ref 149–390)
PMV BLD AUTO: 9.6 FL (ref 8.9–12.7)
POTASSIUM SERPL-SCNC: 5.1 MMOL/L (ref 3.5–5.3)
PROT SERPL-MCNC: 8.4 G/DL (ref 6.4–8.2)
RBC # BLD AUTO: 4.94 MILLION/UL (ref 3.88–5.62)
SODIUM SERPL-SCNC: 138 MMOL/L (ref 136–145)
TRIGL SERPL-MCNC: 1030 MG/DL
TSH SERPL DL<=0.05 MIU/L-ACNC: 2.27 UIU/ML (ref 0.36–3.74)
WBC # BLD AUTO: 4.9 THOUSAND/UL (ref 4.31–10.16)

## 2021-07-30 PROCEDURE — 82306 VITAMIN D 25 HYDROXY: CPT

## 2021-07-30 PROCEDURE — 80061 LIPID PANEL: CPT

## 2021-07-30 PROCEDURE — 85025 COMPLETE CBC W/AUTO DIFF WBC: CPT

## 2021-07-30 PROCEDURE — 80053 COMPREHEN METABOLIC PANEL: CPT

## 2021-07-30 PROCEDURE — 86803 HEPATITIS C AB TEST: CPT

## 2021-07-30 PROCEDURE — 83721 ASSAY OF BLOOD LIPOPROTEIN: CPT

## 2021-07-30 PROCEDURE — 84443 ASSAY THYROID STIM HORMONE: CPT

## 2021-07-30 PROCEDURE — 36415 COLL VENOUS BLD VENIPUNCTURE: CPT

## 2021-07-30 PROCEDURE — 87389 HIV-1 AG W/HIV-1&-2 AB AG IA: CPT

## 2021-07-30 NOTE — RESULT ENCOUNTER NOTE
Unstable labs - please call patient  Cholesterol and Triglycerides are very elevated and uncontrolled  Please schedule appointment with PCP in near future to discuss results and treatment plan         &&&&    Unstable labs - will review with patient at upcoming appointment  Elevated Total Protein - Monitor

## 2021-08-02 LAB — HIV 1+2 AB+HIV1 P24 AG SERPL QL IA: NORMAL

## 2021-08-11 ENCOUNTER — OFFICE VISIT (OUTPATIENT)
Dept: FAMILY MEDICINE CLINIC | Facility: CLINIC | Age: 31
End: 2021-08-11
Payer: COMMERCIAL

## 2021-08-11 VITALS
DIASTOLIC BLOOD PRESSURE: 78 MMHG | HEART RATE: 91 BPM | SYSTOLIC BLOOD PRESSURE: 118 MMHG | WEIGHT: 304.2 LBS | TEMPERATURE: 98.4 F | BODY MASS INDEX: 37.04 KG/M2 | HEIGHT: 76 IN | RESPIRATION RATE: 16 BRPM | OXYGEN SATURATION: 98 %

## 2021-08-11 DIAGNOSIS — E55.9 VITAMIN D DEFICIENCY: ICD-10-CM

## 2021-08-11 DIAGNOSIS — R73.01 IFG (IMPAIRED FASTING GLUCOSE): ICD-10-CM

## 2021-08-11 DIAGNOSIS — E66.01 CLASS 2 SEVERE OBESITY WITH SERIOUS COMORBIDITY AND BODY MASS INDEX (BMI) OF 37.0 TO 37.9 IN ADULT, UNSPECIFIED OBESITY TYPE (HCC): ICD-10-CM

## 2021-08-11 DIAGNOSIS — E78.1 HYPERTRIGLYCERIDEMIA: Primary | ICD-10-CM

## 2021-08-11 DIAGNOSIS — E78.49 OTHER HYPERLIPIDEMIA: ICD-10-CM

## 2021-08-11 DIAGNOSIS — F41.9 ANXIETY: ICD-10-CM

## 2021-08-11 DIAGNOSIS — R77.8 ELEVATED TOTAL PROTEIN: ICD-10-CM

## 2021-08-11 PROCEDURE — 3008F BODY MASS INDEX DOCD: CPT | Performed by: FAMILY MEDICINE

## 2021-08-11 PROCEDURE — 1036F TOBACCO NON-USER: CPT | Performed by: FAMILY MEDICINE

## 2021-08-11 PROCEDURE — 3725F SCREEN DEPRESSION PERFORMED: CPT | Performed by: FAMILY MEDICINE

## 2021-08-11 PROCEDURE — 99214 OFFICE O/P EST MOD 30 MIN: CPT | Performed by: FAMILY MEDICINE

## 2021-08-11 RX ORDER — ERGOCALCIFEROL (VITAMIN D2) 1250 MCG
50000 CAPSULE ORAL WEEKLY
Qty: 12 CAPSULE | Refills: 0 | Status: SHIPPED | OUTPATIENT
Start: 2021-08-11 | End: 2021-11-18

## 2021-08-11 NOTE — PROGRESS NOTES
Assessment/Plan:  Problem List Items Addressed This Visit        Other    Hypertriglyceridemia - Primary     Uncontrolled  Despite the risks of cardiovascular disease and pancreatitis, patient declines starting fenofibrate 160 mg daily  He prefers to try a different type of over-the-counter fish oil medication as well as niacin, though it has shown no evidence based benefit for cardiovascular health  He declines nutrition consult  Advise lifestyle modifications  Relevant Orders    Comprehensive metabolic panel    Lipid panel    LDL cholesterol, direct    TSH, 3rd generation with Free T4 reflex    Class 2 severe obesity with serious comorbidity and body mass index (BMI) of 37 0 to 37 9 in [facility])     Recommend lifestyle modifications  Relevant Orders    TSH, 3rd generation with Free T4 reflex    Anxiety       Stable  Patient declines mood medications and counseling  Smart phone elinor list and counseling list provided previously  Relevant Orders    TSH, 3rd generation with Free T4 reflex    Other hyperlipidemia     Uncontrolled  Despite the risks of cardiovascular disease, patient declines starting Lipitor 20mg nightly  He prefers to try a different type of over-the-counter fish oil medication as well as niacin, though it has shown no evidence based benefit for cardiovascular health  He declines nutrition consult  Advise lifestyle modifications  Relevant Orders    Comprehensive metabolic panel    Lipid panel    LDL cholesterol, direct    TSH, 3rd generation with Free T4 reflex    Vitamin D deficiency     New  Vitamin D Deficiency - Recommend start multivitamin and prescription vitamin D (Drisdol)  When 12 weeks of Drisdol completed, continue multivitamin and start over-the-counter Vitamin D3 1,000-3,000 International Units daily  Check vitamin D level 1 week afterwards completing Drisdol             Relevant Medications    ergocalciferol (ERGOCALCIFEROL) 1 25 MG (40931 UT) capsule    Other Relevant Orders    Vitamin D 25 hydroxy    Comprehensive metabolic panel      Other Visit Diagnoses     IFG (impaired fasting glucose)        Relevant Orders    Comprehensive metabolic panel    Hemoglobin A1C    Elevated total protein        Relevant Orders    Comprehensive metabolic panel           Return in about 6 weeks (around 9/22/2021) for F/U Hyperlipidemia, Hypertriglyceridemia, Vitamin D Deficiency, Obesity, Labs  Future Appointments   Date Time Provider Nora Moody   9/22/2021  3:20 PM Aydee Bautista DO [facility]   7/25/2022  3:00 PM Aydee Bautista DO  [facility]        Subjective:     Jaqueline Nieto is a 33 y o  male who presents today for a follow-up on his chronic medical conditions  HPI:  Chief Complaint   Patient presents with    Follow-up     lab review      -- Above per clinical staff and reviewed  --      HPI      Today:    F/U Hyperlipidemia, Hypertriglyceridemia, Vitamin D Deficiency, Obesity, Labs        Obesity - Not watching diet  No regular exercise  Hyperlipidemia - No statin previously        Hypertriglyceridemia - On Fish Oil 1200mg 2 tabs BID, Niacin 250mg QD  No other Rx previously  Anxiety - Feels like he's coping well  Has increased work stress, 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, but worries about losing his health insurance in the process in the future  Stressed financially, also caring for young son  Sometimes yells  Feels safe at home  Good social supports  No SI/HI/AH/VH  Last counseling 2018 - helpful  No mood meds previously  "I will not even attempt to be on medications for mood  "         PHQ-9 Depression Screening    PHQ-9:   Frequency of the following problems over the past two weeks:      Little interest or pleasure in doing things: 0 - not at all  Feeling down, depressed, or hopeless: 0 - not at all  Trouble falling or staying asleep, or sleeping too much: 0 - not at all  Feeling tired or having little energy: 1 - several days  Poor appetite or overeatin - not at all  Feeling bad about yourself - or that you are a failure or have let yourself or your family down: 0 - not at all  Trouble concentrating on things, such as reading the newspaper or watching television: 0 - not at all  Moving or speaking so slowly that other people could have noticed  Or the opposite - being so fidgety or restless that you have been moving around a lot more than usual: 0 - not at all  Thoughts that you would be better off dead, or of hurting yourself in some way: 0 - not at all  PHQ-2 Score: 0  PHQ-9 Score: 1         BRUNO-7 Flowsheet Screening      Most Recent Value   Over the last 2 weeks, how often have you been bothered by any of the following problems? Feeling nervous, anxious, or on edge  0   Not being able to stop or control worrying  0   Worrying too much about different things  0   Trouble relaxing  0   Being so restless that it is hard to sit still  0   Becoming easily annoyed or irritable  0   Feeling afraid as if something awful might happen  0   BRUNO-7 Total Score  0          Vitamin D Deficiency - Taking MVI, but not OTC Vitamin D              Last Tetanus vaccine at [facility] - 7 in the last 10 years - 4602-4193  From previous note:    Controlled Substance Review     [place] PDMP or [place]  reviewed: No red flags were identified; safe to proceed with prescription        S/p Right OM 3 weeks ago, Rx Amoxicillin  He still feels that his ear is wet  Rx Singulair for possible Eustachian tube dysfunction?       Obesity - Not watching diet  No regular exercise        Hypertriglyceridemia - On Fish Oil 1200mg 2 tabs daily  No other Rx previously        Back Pain - Sees Chiro regularly  Smokes marijuana occasionally for pain relief        Hemorrhoids - s/p Colonoscopy in 2016 in [place], [place] ([address] name unknown)  Patient states repeat colonoscopy due at age 37yo     Anxiety  - Feels like he's coping well  Stressed financially, also caring for young son  Sometimes brendon  Feels safe at home  Good social supports  No SI/HI/AH/VH  Last counseling 2018 - helpful  No mood meds previously  "I will not even attempt to be on medications for mood  "         Reviewed:  Labs 21     Overdue for Dentist   [facility] for Opth[facility]      PHQ-9 Depression Screening    PHQ-9:   Frequency of the following problems over the past two weeks:      Little interest or pleasure in doing things: 0 - not at all  Feeling down, depressed, or hopeless: 0 - not at all  Trouble falling or staying asleep, or sleeping too much: 0 - not at all  Feeling tired or having little energy: 0 - not at all  Poor appetite or overeatin - not at all  Feeling bad about yourself - or that you are a failure or have let yourself or your family down: 0 - not at all  Trouble concentrating on things, such as reading the newspaper or watching television: 0 - not at all  Moving or speaking so slowly that other people could have noticed  Or the opposite - being so fidgety or restless that you have been moving around a lot more than usual: 0 - not at all  Thoughts that you would be better off dead, or of hurting yourself in some way: 0 - not at all  PHQ-2 Score: 0  PHQ-9 Score: 0                BRUNO-7 Flowsheet Screening      Most Recent Value   Over the last 2 weeks, how often have you been bothered by any of the following problems? Feeling nervous, anxious, or on edge  0   Not being able to stop or control worrying  0   Worrying too much about different things  1   Trouble relaxing  1   Being so restless that it is hard to sit still  0   Becoming easily annoyed or irritable  1   Feeling afraid as if something awful might happen  0   BRUNO-7 Total Score  3             MDQ:  1           Last Tetanus vaccine at [facility] [address]  - 7 in the last 10 years - [id number]              The following portions of the patient's history were reviewed and updated as appropriate: allergies, current medications, past family history, past medical history, past social history, past surgical history and problem list       Review of Systems   Constitutional: Negative for appetite change, chills, diaphoresis, fatigue and fever  Respiratory: Negative for chest tightness and shortness of breath  Cardiovascular: Negative for chest pain  Gastrointestinal: Negative for abdominal pain, blood in stool, diarrhea, nausea and vomiting  Genitourinary: Negative for dysuria  Current Outpatient Medications   Medication Sig Dispense Refill    Multiple Vitamin (MULTIVITAMIN) tablet Take 1 tablet by mouth daily       niacin 100 mg tablet Take 250 mg by mouth daily with breakfast Twice a day      Omega-3 Fatty Acids (Fish Oil) 1200 MG CAPS Take 2 capsules by mouth 2 (two) times a day       ergocalciferol (ERGOCALCIFEROL) 1 25 MG (76363 UT) capsule Take 1 capsule (50,000 Units total) by mouth once a week for 12 doses 12 capsule 0     No current facility-administered medications for this visit  Objective:  /78   Pulse 91   Temp 98 4 °F (36 9 °C)   Resp 16   Ht 6' 4" (1 93 m)   Wt (!) 138 kg (304 lb 3 2 oz)   SpO2 98%   BMI 37 03 kg/m²    Wt Readings from Last 3 Encounters:   08/11/21 (!) 138 kg (304 lb 3 2 oz)   07/22/21 (!) 138 kg (305 lb 3 2 oz)   07/08/21 (!) 138 kg (305 lb)      BP Readings from Last 3 Encounters:   08/11/21 118/78   07/22/21 120/78   07/08/21 136/76          Physical Exam  Vitals and nursing note reviewed  Constitutional:       Appearance: Normal appearance  He is well-developed  He is obese  HENT:      Head: Normocephalic and atraumatic  Eyes:      Conjunctiva/sclera: Conjunctivae normal    Neck:      Thyroid: No thyromegaly  Cardiovascular:      Rate and Rhythm: Normal rate and regular rhythm  Pulses: Normal pulses  Heart sounds: Normal heart sounds     Pulmonary:      Effort: Pulmonary effort is normal       Breath sounds: Normal breath sounds  Musculoskeletal:         General: No swelling  Cervical back: Neck supple  Right lower leg: No edema  Left lower leg: No edema  Neurological:      General: No focal deficit present  Mental Status: He is alert and oriented to person, place, and time  Psychiatric:         Mood and Affect: Mood normal          Behavior: Behavior normal          Thought Content: Thought content normal          Judgment: Judgment normal          Lab Results:      Lab Results   Component Value Date    WBC 4 90 07/30/2021    HGB 15 1 07/30/2021    HCT 43 9 07/30/2021     07/30/2021    TRIG 1,030 (H) 07/30/2021    HDL 32 (L) 07/30/2021    LDLDIRECT 227 (H) 07/30/2021    ALT 35 07/30/2021    AST 42 07/30/2021    K 5 1 07/30/2021     07/30/2021    CREATININE 0 70 07/30/2021    BUN 12 07/30/2021    CO2 23 07/30/2021    GLUF 110 (H) 07/30/2021     No results found for: URICACID  Invalid input(s): BASENAME Vitamin D    No results found       POCT Labs

## 2021-08-11 NOTE — PATIENT INSTRUCTIONS
Vitamin D Deficiency - Recommend start multivitamin and prescription vitamin D (Drisdol)  When 12 weeks of Drisdol completed, continue multivitamin and start over-the-counter Vitamin D3 1,000-3,000 International Units daily  Check vitamin D level 1 week afterwards completing Drisdol  For headache and migraine prevention I would suggest a combination vitamin supplement which may include 1 or more of the following ingredients:  Magnesium 400 mg , Riboflavin (vitamin B2) 400 - 600 mg,  Butterbur 150 mg (PA free)  Other ingredients that may be helpful are feverfew, coenzyme Q10 100 mg three times a day,  melatonin and jessica  Some example brands that combine some of these ingredients are Migravent or Dolovent  Hyperlipidemia   AMBULATORY CARE:   Hyperlipidemia  is a high level of lipids (fats) in your blood  These lipids include cholesterol or triglycerides  Lipids are made by your body  They also come from the foods you eat  Your body needs lipids to work properly, but high levels increase your risk for heart disease, heart attack, and stroke  Call your local emergency number [phone] in the [facility] [facility] or have someone call if:   · You have any of the following signs of a heart attack:      ? Squeezing, pressure, or pain in your chest    ? You may  also have any of the following:     § Discomfort or pain in your back, neck, jaw, stomach, or arm    § Shortness of breath    § Nausea or vomiting    § Lightheadedness or a sudden cold sweat    · You have any of the following signs of a stroke:      ? Numbness or drooping on one side of your face     ? Weakness in an arm or leg    ? Confusion or difficulty speaking    ? Dizziness, a severe headache, or vision loss    Call your doctor if:   · You have questions or concerns about your condition or care  Treatment  may first include lifestyle changes to help decrease your lipid levels  Your provider may recommend you work with a team to manage hyperlipidemia   The team may include medical experts such as a dietitian, an exercise or physical therapist, and a behavior therapist  Your family members may be included in helping you create lifestyle changes  You may also need to take medicine to lower your lipid levels  Some of the lifestyle changes you may need to make include the following:  · Maintain a healthy weight  Ask your healthcare provider what a healthy weight is for you  Ask him or her to help you create a weight loss plan if you are overweight  Weight loss can decrease your cholesterol and triglyceride levels  · Be physically active throughout the day  Physical activity, such as exercise, lowers your cholesterol levels and helps you maintain a healthy weight  Get 30 minutes or more of aerobic exercise 4 to 6 days each week  You can split your exercise into four 10-minute workouts instead of 30 minutes at one time  Examples of aerobic exercises include walking briskly, swimming, or riding a bike  Work with your healthcare provider to plan the best exercise program for you  Also include strength training at least 2 times each week  Your healthcare providers can help you create a physical activity plan  · Do not smoke  Nicotine and other chemicals in cigarettes and cigars can increase your risk for a heart attack and stroke  Ask your healthcare provider for information if you currently smoke and need help to quit  E-cigarettes or smokeless tobacco still contain nicotine  Talk to your healthcare provider before you use these products  · Eat heart-healthy foods  A dietitian or your provider can give you more information on low-sodium plans or the DASH (Dietary Approaches to Stop Hypertension) eating plan  The DASH plan is low in sodium, processed sugar, unhealthy fats, and total fat  It is high in potassium, calcium, and fiber  It is high in potassium, calcium, and fiber  These can be found in vegetables, fruit, and whole-grain foods   The following are ways to get more heart-healthy foods:         ? Decrease the total amount of fat you eat  Choose lean meats, fat-free or 1% fat milk, and low-fat dairy products, such as yogurt and cheese  Limit or do not eat red meat  Red meats are high in fat and cholesterol  ? Replace unhealthy fats with healthy fats  Unhealthy fats include saturated fat, trans fat, and cholesterol  Choose soft margarines that are low in saturated fat and have little or no trans fat  Monounsaturated fats are healthy fats  These are found in olive oil, canola oil, avocado, and nuts  Polyunsaturated fats are also healthy  These are found in fish, flaxseed, walnuts, and soybeans  ? Eat 5 or more servings of fruits and vegetables every day  They are low in calories and fat and a good source of essential vitamins  Include dark green, red, and orange vegetables  Examples include spinach, kale, broccoli, and carrots  ? Eat foods high in fiber  Fiber can help lower your cholesterol levels  Choose whole grain, high-fiber foods  Good choices include whole-wheat breads or cereals, beans, peas, fruits, and vegetables  ? Limit sodium (salt) as directed  Too much sodium can affect your fluid balance and blood pressure  Your healthcare provider will tell you how much sodium and potassium are safe for you to have in a day  He or she may recommend that you limit sodium to 2,300 mg a day  Your provider or a dietitian can help you find ways to limit sodium  For example, if you add salt while you cook, do not add more salt at the table  Check labels to find low-sodium or no-salt-added foods  Some low-sodium foods use potassium salts for flavor  Too much potassium can also cause health problems  · Ask your healthcare provider if it is okay for you to drink alcohol  Alcohol can increase your cholesterol and triglyceride levels  Your provider can tell you how many drinks are okay to have within 24 hours and within 1 week   A drink of alcohol is 12 ounces of beer, 5 ounces of wine, or 1½ ounces of liquor  Follow up with your doctor as directed: You may need to return for more tests  Your healthcare provider may refer you to a dietitian  Write down your questions so you remember to ask them during your visits  © Copyright [employer] 2021 Information is for End User's use only and may not be sold, redistributed or otherwise used for commercial purposes  All illustrations and images included in CareNotes® are the copyrighted property of A D A M , Inc  or [facility] Pranay Zñuiga   The above information is an  only  It is not intended as medical advice for individual conditions or treatments  Talk to your doctor, nurse or pharmacist before following any medical regimen to see if it is safe and effective for you

## 2021-08-12 ENCOUNTER — TELEPHONE (OUTPATIENT)
Dept: ADMINISTRATIVE | Facility: OTHER | Age: 31
End: 2021-08-12

## 2021-08-12 NOTE — LETTER
Vaccination Request Form: TD      Date Requested: 21  Patient: Emelina Pyle  Patient : 1990   Referring Provider: Manda Hodge,        The above patient has informed us that they have had their   most recent TD administered at your facility  Please   complete this form and attach all corresponding documentation  Date of Vaccine(s) Given  ______________________________    Lot Number(s) _______________________________________    Manufacture(s) ______________________________________    Dose Amount (s) _____________________________________    Expiration Date(s) ____________________________________    Comments __________________________________________________________  ____________________________________________________________________  ____________________________________________________________________  ____________________________________________________________________    Administering Facility  ________________________________________________    Vaccine Administered By (print name) ___________________________________      Form Completed By (print name) _______________________________________      Signature ___________________________________________________________      These reports are needed for  compliance    Please fax this completed form and a copy of the Vaccine Documents to our office located at [place] [address] as soon as possible to [phone] attention Judith: Phone [phone]    We thank you for your assistance in treating our mutual patient     (sent to [address] [place] [address])

## 2021-08-12 NOTE — LETTER
Vaccination Request Form: TD      Date Requested: 21  Patient: Aliyah Gaspar  Patient : 1990   Referring Provider: Suleman Meneses,        The above patient has informed us that they have had their   most recent TD administered at your facility  Please   complete this form and attach all corresponding documentation  Date of Vaccine(s) Given  ______________________________    Lot Number(s) _______________________________________    Manufacture(s) ______________________________________    Dose Amount (s) _____________________________________    Expiration Date(s) ____________________________________    Comments __________________________________________________________  ____________________________________________________________________  ____________________________________________________________________  ____________________________________________________________________    Administering Facility  ________________________________________________    Vaccine Administered By (print name) ___________________________________      Form Completed By (print name) _______________________________________      Signature ___________________________________________________________      These reports are needed for  compliance    Please fax this completed form and a copy of the Vaccine Documents to our office located at [place] [address] as soon as possible to [phone] attention Judith: Phone [phone]    We thank you for your assistance in treating our mutual patient     (Sent to [facility])

## 2021-08-12 NOTE — TELEPHONE ENCOUNTER
----- Message from Anika Conrad DO sent at 8/11/2021  1:22 PM EDT -----  Regarding: Last Tetanus vaccine at [facility]  - 7 in the last 10 years - [id number] 08/11/21 1:22 PM    Hello, our patient Liss Moreno has had Immunization(s) completed/performed  Please assist in updating the patient chart by making an External outreach to  facility located in   The date of service is   Last Tetanus vaccine at [place] [address] in the last 10 years - [id number]        Thank you,  Edie Valdovinos DO  [facility]  Jose Black

## 2021-08-12 NOTE — TELEPHONE ENCOUNTER
Upon review of the In Basket request and the patient's chart, initial outreach has been made via fax, please see Contacts section for details       Thank you  Rocky rhodes MA

## 2021-08-12 NOTE — LETTER
Vaccination Request Form: TD      Date Requested: 21  Patient: Maddie Balderas  Patient : 1990   Referring Provider: Bradley Fernandez,        The above patient has informed us that they have had their   most recent TD administered at your facility  Please   complete this form and attach all corresponding documentation  Date of Vaccine(s) Given  ______________________________    Lot Number(s) _______________________________________    Manufacture(s) ______________________________________    Dose Amount (s) _____________________________________    Expiration Date(s) ____________________________________    Comments __________________________________________________________  ____________________________________________________________________  ____________________________________________________________________  ____________________________________________________________________    Administering Facility  ________________________________________________    Vaccine Administered By (print name) ___________________________________      Form Completed By (print name) _______________________________________      Signature ___________________________________________________________      These reports are needed for  compliance    Please fax this completed form and a copy of the Vaccine Documents to our office located at [place] [address] as soon as possible to [phone] attention Judith: Phone [phone]    We thank you for your assistance in treating our mutual patient     (Sent to [facility])

## 2021-08-14 NOTE — ASSESSMENT & PLAN NOTE
New       Vitamin D Deficiency - Recommend start multivitamin and prescription vitamin D (Drisdol)  When 12 weeks of Drisdol completed, continue multivitamin and start over-the-counter Vitamin D3 1,000-3,000 International Units daily  Check vitamin D level 1 week afterwards completing Drisdol

## 2021-08-14 NOTE — ASSESSMENT & PLAN NOTE
Uncontrolled  Despite the risks of cardiovascular disease and pancreatitis, patient declines starting fenofibrate 160 mg daily  He prefers to try a different type of over-the-counter fish oil medication as well as niacin, though it has shown no evidence based benefit for cardiovascular health  He declines nutrition consult  Advise lifestyle modifications

## 2021-08-14 NOTE — ASSESSMENT & PLAN NOTE
Uncontrolled  Despite the risks of cardiovascular disease, patient declines starting Lipitor 20mg nightly  He prefers to try a different type of over-the-counter fish oil medication as well as niacin, though it has shown no evidence based benefit for cardiovascular health  He declines nutrition consult  Advise lifestyle modifications

## 2021-08-14 NOTE — ASSESSMENT & PLAN NOTE
Stable  Patient declines mood medications and counseling  Smart phone elinor list and counseling list provided previously

## 2021-08-18 NOTE — TELEPHONE ENCOUNTER
As a follow-up, a second attempt has been made for outreach via fax, please see Contacts section for details      Thank you  Holland Persaud MA

## 2021-08-23 NOTE — TELEPHONE ENCOUNTER
Upon review of the In Basket request we have found as a result of outreach the facility stated they do not have records for this service  Any additional questions or concerns should be emailed to the Practice Liaisons via [email or web address] email, please do not reply via In Basket      Thank you  Leopoldo Hymen, MA

## 2021-08-23 NOTE — TELEPHONE ENCOUNTER
Please notify patient that [facility]  does not have any Tdap / Tetanus records on file  Please ask him to provide a copy of his vaccine record  If he is unable to do so and desires to update Tdap as his wife is newly pregnant, he can schedule a nurse visit for that as well  Tdap will need to be ordered prior to nurse visit if desired

## 2021-08-23 NOTE — TELEPHONE ENCOUNTER
As a final attempt, a third outreach has been made via telephone call and fax  Please see Contacts section for details  This encounter will be closed and completed by end of day  Should we receive the requested information because of previous outreach attempts, the requested patient's chart will be updated appropriately       Thank you  Marquez Zamora MA

## 2021-08-23 NOTE — TELEPHONE ENCOUNTER
Patient is aware and informed he would complete Tdap at next scheduled visit, declined a nurse visit at this time

## 2021-08-23 NOTE — TELEPHONE ENCOUNTER
Received record request response from [address] stating 'A thorough search of our files, carried out under my direction, revealed no documents, records or other materials called for in the request for medical records identified below '

## 2021-08-24 ENCOUNTER — OFFICE VISIT (OUTPATIENT)
Dept: URGENT CARE | Facility: MEDICAL CENTER | Age: 31
End: 2021-08-24
Payer: COMMERCIAL

## 2021-08-24 VITALS — HEART RATE: 118 BPM | TEMPERATURE: 98.5 F | RESPIRATION RATE: 21 BRPM | OXYGEN SATURATION: 98 %

## 2021-08-24 DIAGNOSIS — A08.4 VIRAL GASTROENTERITIS: Primary | ICD-10-CM

## 2021-08-24 PROCEDURE — 99213 OFFICE O/P EST LOW 20 MIN: CPT | Performed by: PHYSICIAN ASSISTANT

## 2021-08-24 RX ORDER — OMEPRAZOLE 20 MG/1
20 CAPSULE, DELAYED RELEASE ORAL DAILY
Qty: 14 CAPSULE | Refills: 0 | Status: SHIPPED | OUTPATIENT
Start: 2021-08-24 | End: 2021-09-22

## 2021-08-24 RX ORDER — ONDANSETRON 4 MG/1
4 TABLET, ORALLY DISINTEGRATING ORAL EVERY 6 HOURS PRN
Qty: 20 TABLET | Refills: 0 | Status: SHIPPED | OUTPATIENT
Start: 2021-08-24 | End: 2021-09-22

## 2021-08-24 NOTE — PATIENT INSTRUCTIONS
Begin using Zofran and omeprazole as prescribed  Continue with small spis liquids frequently  Band foods for the next few days  Follow up with PCP in 3-5 days for continued symptoms   Proceed to the ER with worsening, Inability to tolerate oral intake, decreased urination, worsening headaches, abdominal pain, fever or chills not responsive to medications  Gastroenteritis   WHAT YOU NEED TO KNOW:   Gastroenteritis, or stomach flu, is an infection of the stomach and intestines  DISCHARGE INSTRUCTIONS:   Call 911 for any of the following:   · You have trouble breathing or a very fast pulse  Return to the emergency department if:   · You see blood in your diarrhea  · You cannot stop vomiting  · You have not urinated for 12 hours  · You feel like you are going to faint  Contact your healthcare provider if:   · You have a fever  · You continue to vomit or have diarrhea, even after treatment  · You see worms in your diarrhea  · Your mouth or eyes are dry  You are not urinating as much or as often  · You have questions or concerns about your condition or care  Medicines:   · Medicines  may be given to stop vomiting or diarrhea, decrease abdominal cramps, or treat an infection  · Take your medicine as directed  Contact your healthcare provider if you think your medicine is not helping or if you have side effects  Tell him or her if you are allergic to any medicine  Keep a list of the medicines, vitamins, and herbs you take  Include the amounts, and when and why you take them  Bring the list or the pill bottles to follow-up visits  Carry your medicine list with you in case of an emergency  Manage your symptoms:   · Drink liquids as directed  Ask your healthcare provider how much liquid to drink each day, and which liquids are best for you  You may also need to drink an oral rehydration solution (ORS)   An ORS has the right amounts of sugar, salt, and minerals in water to replace body fluids  · Eat bland foods  When you feel hungry, begin eating soft, bland foods  Examples are bananas, clear soup, potatoes, and applesauce  Do not have dairy products, alcohol, sugary drinks, or drinks with caffeine until you feel better  · Rest as much as possible  Slowly start to do more each day when you begin to feel better  Prevent the spread of gastroenteritis:  Gastroenteritis can spread easily  Keep yourself, your family, and your surroundings clean to help prevent the spread of gastroenteritis:  · Wash your hands often  Use soap and water  Wash your hands after you use the bathroom, change a child's diapers, or sneeze  Wash your hands before you prepare or eat food  · Clean surfaces and do laundry often  Wash your clothes and towels separately from the rest of the laundry  Clean surfaces in your home with antibacterial  or bleach  · Clean food thoroughly and cook safely  Wash raw vegetables before you cook  Cook meat, fish, and eggs fully  Do not use the same dishes for raw meat as you do for other foods  Refrigerate any leftover food immediately  · Be aware when you camp or travel  Drink only clean water  Do not drink from rivers or lakes unless you purify or boil the water first  When you travel, drink bottled water and do not add ice  Do not eat fruit that has not been peeled  Do not eat raw fish or meat that is not fully cooked  Follow up with your healthcare provider as directed:  Write down your questions so you remember to ask them during your visits  © Copyright [employer] 2021 Information is for End User's use only and may not be sold, redistributed or otherwise used for commercial purposes  All illustrations and images included in CareNotes® are the copyrighted property of [employer]  or [employer]iga   The above information is an  only  It is not intended as medical advice for individual conditions or treatments  Talk to your doctor, nurse or pharmacist before following any medical regimen to see if it is safe and effective for you

## 2021-08-24 NOTE — LETTER
August 24, 2021     Patient: Samantha Mendez   YOB: 1990   Date of Visit: 8/24/2021       To Whom it May Concern:    Samantha Mendez was seen in my clinic on 8/24/2021  Please excuse from work today  If you have any questions or concerns, please don't hesitate to call           Sincerely,          Shari Bryant PA-C

## 2021-08-24 NOTE — PROGRESS NOTES
[address]        NAME: Martita Mcclure is a 33 y o  male  : 1990    MRN: [id number]  DATE: 2021  TIME: 12:44 PM    Assessment and Plan   Viral gastroenteritis [A08 4]  1  Viral gastroenteritis  ondansetron (ZOFRAN-ODT) 4 mg disintegrating tablet    omeprazole (PriLOSEC) 20 mg delayed release capsule         Patient Instructions     Begin using Zofran and omeprazole as prescribed  Continue with small spis liquids frequently  Band foods for the next few days  Follow up with PCP in 3-5 days for continued symptoms   Proceed to the ER with worsening, Inability to tolerate oral intake, decreased urination, worsening headaches, abdominal pain, fever or chills not responsive to medications  Follow up with PCP in 3-5 days  Proceed to  ER if symptoms worsen  Chief Complaint     Chief Complaint   Patient presents with    Vomiting     Started yesterday afternoon with vomiting, diarrhea, headache, dehydrated  Hasn't been able to eat or drink, is sipping liquids and slowly keeping it down  Last night took anti nausea medication can't remember what it was  Hasn't received covid vaccine  History of Present Illness       33year old male presents to the office for c/o of  vomiting, diarrhea, headaches initially began for him yesterday afternoon  Patient notes that family members are sick with similar symptoms  Notes he had 1 episode of vomiting yesterday and has had any since  He has had 4-5 loose stools since yesterday  Denies any blood in stool, urine, emesis  Has had associated headaches and mild dizziness at times  Patient unsure of any tainted food intake but does admit to eating chicken which was a few days old  Patient denies any contact with COVID as far as he is aware  Has not had any vaccinations against COVID  Denies any significant sore throat cough shortness of breath chest pain  Does admit to myalgias which he believe is due to decreased fluid intake  Patient urinating significant concentration does mild decreased  Vomiting   This is a new problem  The current episode started yesterday  The problem occurs less than 2 times per day  The problem has been unchanged  There has been no fever  Associated symptoms include arthralgias, chills (after vomiting yesterday ), diarrhea, dizziness, headaches and myalgias  Pertinent negatives include no abdominal pain (only upset stomach  denies any pain  ), coughing or fever  Risk factors include ill contacts and suspect food intake  Review of Systems   Review of Systems   Constitutional: Positive for appetite change and chills (after vomiting yesterday )  Negative for fever  HENT: Positive for congestion (chonic  no new changes  )  Negative for postnasal drip, rhinorrhea, sore throat and trouble swallowing  Respiratory: Negative for cough, chest tightness and shortness of breath  Cardiovascular: Negative  Gastrointestinal: Positive for diarrhea, nausea and vomiting  Negative for abdominal distention, abdominal pain (only upset stomach  denies any pain  ), blood in stool and constipation  Genitourinary: Positive for decreased urine volume (mild )  Negative for hematuria  Musculoskeletal: Positive for arthralgias and myalgias  Skin: Negative  Neurological: Positive for dizziness and headaches           Current Medications       Current Outpatient Medications:     ergocalciferol (ERGOCALCIFEROL) 1 25 MG (40190 UT) capsule, Take 1 capsule (50,000 Units total) by mouth once a week for 12 doses, Disp: 12 capsule, Rfl: 0    Multiple Vitamin (MULTIVITAMIN) tablet, Take 1 tablet by mouth daily , Disp: , Rfl:     niacin 100 mg tablet, Take 250 mg by mouth daily with breakfast Twice a day, Disp: , Rfl:     Omega-3 Fatty Acids (Fish Oil) 1200 MG CAPS, Take 2 capsules by mouth 2 (two) times a day , Disp: , Rfl:     omeprazole (PriLOSEC) 20 mg delayed release capsule, Take 1 capsule (20 mg total) by mouth daily, Disp: 14 capsule, Rfl: 0    ondansetron (ZOFRAN-ODT) 4 mg disintegrating tablet, Take 1 tablet (4 mg total) by mouth every 6 (six) hours as needed for nausea or vomiting, Disp: 20 tablet, Rfl: 0    Current Allergies     Allergies as of 08/24/2021 - Reviewed 08/24/2021   Allergen Reaction Noted    Amoxicillin Swelling 07/22/2021            The following portions of the patient's history were reviewed and updated as appropriate: allergies, current medications, past family history, past medical history, past social history, past surgical history and problem list      Past Medical History:   Diagnosis Date    Anxiety 07/22/2021    Back pain     See Chiro regularly    Class 2 severe obesity with serious comorbidity and body mass index (BMI) of 37 0 to 37 9 in adult [facility])     History of asthma     Symptomatic when smoking  Last used inhaler in 2011   Hypertriglyceridemia     Other hemorrhoids     Other hyperlipidemia     Vitamin D deficiency        Past Surgical History:   Procedure Laterality Date    COLONOSCOPY  2016    +Hemorrhoids       Family History   Problem Relation Age of Onset    Hyperlipidemia Mother     Restless legs syndrome Mother     No Known Problems Father     ADD / ADHD Brother     No Known Problems Son          Medications have been verified  Objective   Pulse (!) 118   Temp 98 5 °F (36 9 °C) (Temporal)   Resp 21   SpO2 98%   No LMP for male patient  Physical Exam     Physical Exam  Vitals and nursing note reviewed  Constitutional:       General: He is not in acute distress  Appearance: He is well-developed  He is not ill-appearing or diaphoretic  Comments:   No acute distress   HENT:      Head: Normocephalic and atraumatic  Right Ear: Hearing, tympanic membrane, ear canal and external ear normal  No middle ear effusion  Left Ear: Hearing, tympanic membrane, ear canal and external ear normal   No middle ear effusion        Nose: No mucosal edema, congestion or rhinorrhea  Mouth/Throat:      Lips: Pink  Pharynx: Uvula midline  No oropharyngeal exudate, posterior oropharyngeal erythema or uvula swelling  Comments: MMM  Eyes:      General: Lids are normal       Conjunctiva/sclera: Conjunctivae normal       Pupils: Pupils are equal, round, and reactive to light  Cardiovascular:      Rate and Rhythm: Normal rate and regular rhythm  Heart sounds: Normal heart sounds, S1 normal and S2 normal  No murmur heard  Pulmonary:      Effort: Pulmonary effort is normal  No respiratory distress  Breath sounds: Normal breath sounds  No stridor  No decreased breath sounds, wheezing or rales  Abdominal:      General: Abdomen is flat  Bowel sounds are normal  There is no distension  Palpations: Abdomen is soft  Tenderness: There is no abdominal tenderness  There is no right CVA tenderness, left CVA tenderness, guarding or rebound  Musculoskeletal:      Cervical back: Neck supple  Lymphadenopathy:      Cervical: No cervical adenopathy  Skin:     General: Skin is warm and dry  Findings: No rash  Comments:  Normal skin turgor   Neurological:      Mental Status: He is alert  Psychiatric:         Behavior: Behavior is cooperative

## 2021-09-22 ENCOUNTER — LAB (OUTPATIENT)
Dept: LAB | Facility: CLINIC | Age: 31
End: 2021-09-22
Payer: COMMERCIAL

## 2021-09-22 ENCOUNTER — OFFICE VISIT (OUTPATIENT)
Dept: FAMILY MEDICINE CLINIC | Facility: CLINIC | Age: 31
End: 2021-09-22
Payer: COMMERCIAL

## 2021-09-22 VITALS
DIASTOLIC BLOOD PRESSURE: 78 MMHG | BODY MASS INDEX: 36.87 KG/M2 | RESPIRATION RATE: 16 BRPM | HEIGHT: 76 IN | SYSTOLIC BLOOD PRESSURE: 112 MMHG | TEMPERATURE: 98 F | WEIGHT: 302.8 LBS | HEART RATE: 86 BPM | OXYGEN SATURATION: 97 %

## 2021-09-22 DIAGNOSIS — F41.9 ANXIETY: ICD-10-CM

## 2021-09-22 DIAGNOSIS — M70.41 PREPATELLAR BURSITIS, RIGHT KNEE: ICD-10-CM

## 2021-09-22 DIAGNOSIS — E78.49 OTHER HYPERLIPIDEMIA: ICD-10-CM

## 2021-09-22 DIAGNOSIS — R77.8 ELEVATED TOTAL PROTEIN: ICD-10-CM

## 2021-09-22 DIAGNOSIS — E66.01 CLASS 2 SEVERE OBESITY WITH SERIOUS COMORBIDITY AND BODY MASS INDEX (BMI) OF 36.0 TO 36.9 IN ADULT, UNSPECIFIED OBESITY TYPE (HCC): ICD-10-CM

## 2021-09-22 DIAGNOSIS — E78.1 HYPERTRIGLYCERIDEMIA: Primary | ICD-10-CM

## 2021-09-22 DIAGNOSIS — E66.01 CLASS 2 SEVERE OBESITY WITH SERIOUS COMORBIDITY AND BODY MASS INDEX (BMI) OF 37.0 TO 37.9 IN ADULT, UNSPECIFIED OBESITY TYPE (HCC): ICD-10-CM

## 2021-09-22 DIAGNOSIS — M25.561 ACUTE PAIN OF RIGHT KNEE: ICD-10-CM

## 2021-09-22 DIAGNOSIS — E55.9 VITAMIN D DEFICIENCY: ICD-10-CM

## 2021-09-22 DIAGNOSIS — R73.01 IFG (IMPAIRED FASTING GLUCOSE): ICD-10-CM

## 2021-09-22 DIAGNOSIS — E78.1 HYPERTRIGLYCERIDEMIA: ICD-10-CM

## 2021-09-22 LAB
ALBUMIN SERPL BCP-MCNC: 3.9 G/DL (ref 3.5–5)
ALP SERPL-CCNC: 78 U/L (ref 46–116)
ALT SERPL W P-5'-P-CCNC: 46 U/L (ref 12–78)
ANION GAP SERPL CALCULATED.3IONS-SCNC: 12 MMOL/L (ref 4–13)
AST SERPL W P-5'-P-CCNC: 22 U/L (ref 5–45)
BILIRUB SERPL-MCNC: 0.61 MG/DL (ref 0.2–1)
BUN SERPL-MCNC: 12 MG/DL (ref 5–25)
CALCIUM SERPL-MCNC: 8.4 MG/DL (ref 8.3–10.1)
CHLORIDE SERPL-SCNC: 103 MMOL/L (ref 100–108)
CHOLEST SERPL-MCNC: 231 MG/DL (ref 50–200)
CO2 SERPL-SCNC: 25 MMOL/L (ref 21–32)
CREAT SERPL-MCNC: 1.01 MG/DL (ref 0.6–1.3)
EST. AVERAGE GLUCOSE BLD GHB EST-MCNC: 111 MG/DL
GFR SERPL CREATININE-BSD FRML MDRD: 97 ML/MIN/1.73SQ M
GLUCOSE P FAST SERPL-MCNC: 121 MG/DL (ref 65–99)
HBA1C MFR BLD: 5.5 %
HDLC SERPL-MCNC: 31 MG/DL
LDLC SERPL DIRECT ASSAY-MCNC: 62 MG/DL (ref 0–100)
NONHDLC SERPL-MCNC: 200 MG/DL
POTASSIUM SERPL-SCNC: 4 MMOL/L (ref 3.5–5.3)
PROT SERPL-MCNC: 7.5 G/DL (ref 6.4–8.2)
SODIUM SERPL-SCNC: 140 MMOL/L (ref 136–145)
TRIGL SERPL-MCNC: 938 MG/DL
TSH SERPL DL<=0.05 MIU/L-ACNC: 3.71 UIU/ML (ref 0.36–3.74)

## 2021-09-22 PROCEDURE — 3725F SCREEN DEPRESSION PERFORMED: CPT | Performed by: FAMILY MEDICINE

## 2021-09-22 PROCEDURE — 84443 ASSAY THYROID STIM HORMONE: CPT

## 2021-09-22 PROCEDURE — 80053 COMPREHEN METABOLIC PANEL: CPT

## 2021-09-22 PROCEDURE — 3008F BODY MASS INDEX DOCD: CPT | Performed by: FAMILY MEDICINE

## 2021-09-22 PROCEDURE — 36415 COLL VENOUS BLD VENIPUNCTURE: CPT

## 2021-09-22 PROCEDURE — 83721 ASSAY OF BLOOD LIPOPROTEIN: CPT

## 2021-09-22 PROCEDURE — 83036 HEMOGLOBIN GLYCOSYLATED A1C: CPT

## 2021-09-22 PROCEDURE — 1036F TOBACCO NON-USER: CPT | Performed by: FAMILY MEDICINE

## 2021-09-22 PROCEDURE — 99214 OFFICE O/P EST MOD 30 MIN: CPT | Performed by: FAMILY MEDICINE

## 2021-09-22 PROCEDURE — 80061 LIPID PANEL: CPT

## 2021-09-22 RX ORDER — FENOFIBRATE 160 MG/1
160 TABLET ORAL DAILY
Qty: 30 TABLET | Refills: 1 | Status: SHIPPED | OUTPATIENT
Start: 2021-09-22 | End: 2021-10-01

## 2021-09-22 NOTE — RESULT ENCOUNTER NOTE
Unstable labs - will review with patient at upcoming appointment  Cholesterol and Triglycerides are very elevated and uncontrolled  To consider   starting fenofibrate 160 mg daily?

## 2021-09-22 NOTE — ASSESSMENT & PLAN NOTE
Uncontrolled  Due to the risks of cardiovascular disease and pancreatitis, start fenofibrate 160 mg daily  He prefers to try a different type of over-the-counter fish oil medication as well as niacin, though it has shown no evidence based benefit for cardiovascular health  He declines nutrition consult  Advise lifestyle modifications

## 2021-09-22 NOTE — PATIENT INSTRUCTIONS
Vitamin D Deficiency - Recommend start multivitamin and prescription vitamin D (Drisdol)  When 12 weeks of Drisdol completed, continue multivitamin and start over-the-counter Vitamin D3 1,000-3,000 International Units daily  Check vitamin D level 1 week afterwards completing Drisdol  You may use Motrin (Ibuprofen) up to 800mg 3 times daily with food (in 24 hours) as needed for pain or fever  Knee Bursitis   WHAT YOU NEED TO KNOW:   What is knee bursitis? Knee bursitis is inflammation of the bursa in your knee  The bursa is a fluid-filled sac that acts as a cushion between a bone and a tendon  A tendon is a cord of strong tissue that connects muscles to bones  What increases my risk for knee bursitis? · An injury, such as a fall    · Bacterial infection    · Overuse of your knees, such as when you run or jump    · Constant pressure on your knees, such as when you kneel to garden, clean the floor, or lay carpet    · Medical conditions, such as rheumatoid arthritis or gout    What are the signs and symptoms of knee bursitis? · Pain, swelling, or tenderness in your knee    · Decreased movement or stiffness of your knee    · Red, warm, skin over your knee    · A grating or grinding sound or feeling when you move your knee    How is knee bursitis diagnosed? Your healthcare provider will examine your knee and ask about your injury or activities  You may need any of the following:  · Blood tests  may show signs of infection or a disease such as rheumatoid arthritis  · X-ray or MRI  pictures may show bone position problems, arthritis, or a fracture  You may be given contrast liquid to help your knee show up better in the pictures  Tell the healthcare provider if you have ever had an allergic reaction to contrast liquid  Do not enter the MRI room with anything metal  Metal can cause serious injury  Tell the healthcare provider if you have any metal in or on your body      · A sample of fluid  from your knee may tested for signs of infection  Removal of bursa fluid may also help relieve your symptoms  How is knee bursitis treated? · Medicines:      ? NSAIDs , such as ibuprofen, help decrease swelling, pain, and fever  NSAIDs can cause stomach bleeding or kidney problems in certain people  If you take blood thinner medicine, always ask your healthcare provider if NSAIDs are safe for you  Always read the medicine label and follow directions  ? Aspirin  helps relieve pain and swelling  Take aspirin exactly as directed by your healthcare provider  ? Antibiotics  help fight an infection caused by bacteria  ? Steroids  help relieve pain and swelling  Steroid injections are given directly into the painful area  Steroid pills may be given for a short time  · Surgery  may be used to remove your bursa  Surgery is only done when other treatments do not work  How can I manage my symptoms? · Rest your knee as much as possible to decrease pain and swelling  Slowly start to do more each day  Return to your daily activities as directed  · Apply ice to help decrease swelling and pain  Ice may also help prevent tissue damage  Use an ice pack, or put crushed ice in a plastic bag  Cover it with a towel and place it on your knee for 15 to 20 minutes, 3 to 4 times each day, as directed  · Apply heat to help decrease pain and stiffness  Apply heat on the area for 15 to 20 minutes, 3 to 4 times each day, as directed  · Apply compression to decrease swelling  Healthcare providers may wrap your knee with tape or an elastic bandage to decrease swelling  Loosen the elastic bandage if you start to lose feeling in your toes  · Elevate your knee above the level of your heart as often as you can  This will help decrease swelling and pain  Prop your lower leg on pillows or blankets to keep it elevated comfortably  Do not put the pillow directly under your knee           · Go to physical therapy, if directed  A physical therapist can teach you exercises to improve your range of motion and increase knee strength  How can I prevent knee bursitis? · Stretch, warm up, and cool down when you exercise  This will help loosen your muscles and decrease stress on your knees  Rest between workouts  · Protect your knees  Use kneepads when you kneel on a hard surface and when you play sports  Stand and walk around every 20 minutes if you have to kneel for a long period of time  When should I call my doctor? · Your pain and swelling increase  · Your symptoms do not improve with treatment  · You have a fever  · You have questions or concerns about your condition or care  CARE AGREEMENT:   You have the right to help plan your care  Learn about your health condition and how it may be treated  Discuss treatment options with your healthcare providers to decide what care you want to receive  You always have the right to refuse treatment  The above information is an  only  It is not intended as medical advice for individual conditions or treatments  Talk to your doctor, nurse or pharmacist before following any medical regimen to see if it is safe and effective for you  © Copyright [employer] 2021 Information is for End User's use only and may not be sold, redistributed or otherwise used for commercial purposes   All illustrations and images included in CareNotes® are the copyrighted property of [employer]  or [address] [place] [address]

## 2021-09-22 NOTE — ASSESSMENT & PLAN NOTE
Continue Drisdol  Vitamin D Deficiency - Recommend start multivitamin and prescription vitamin D (Drisdol)  When 12 weeks of Drisdol completed, continue multivitamin and start over-the-counter Vitamin D3 1,000-3,000 International Units daily  Check vitamin D level 1 week afterwards completing Drisdol

## 2021-09-22 NOTE — PROGRESS NOTES
Assessment/Plan:  Problem List Items Addressed This Visit        Other    Hypertriglyceridemia - Primary     Uncontrolled  Due to the risks of cardiovascular disease and pancreatitis, start fenofibrate 160 mg daily  He prefers to try a different type of over-the-counter fish oil medication as well as niacin, though it has shown no evidence based benefit for cardiovascular health  He declines nutrition consult  Advise lifestyle modifications  Relevant Medications    fenofibrate 160 MG tablet    Other Relevant Orders    Comprehensive metabolic panel    Triglycerides    Comprehensive metabolic panel    Lipid panel    TSH, 3rd generation with Free T4 reflex    LDL cholesterol, direct    Class 2 severe obesity with serious comorbidity and body mass index (BMI) of 36 0 to 36 9 in adult (HCC)     Stable  Recommend lifestyle modifications  Relevant Orders    TSH, 3rd generation with Free T4 reflex    Anxiety       Stable  Patient declines mood medications and counseling  Smart phone elinor list and counseling list provided previously  Relevant Orders    TSH, 3rd generation with Free T4 reflex    Other hyperlipidemia     Stable s statin  Recommend lifestyle modifications  Relevant Medications    fenofibrate 160 MG tablet    Other Relevant Orders    Comprehensive metabolic panel    Lipid panel    TSH, 3rd generation with Free T4 reflex    LDL cholesterol, direct    Vitamin D deficiency     Continue Drisdol  Vitamin D Deficiency - Recommend start multivitamin and prescription vitamin D (Drisdol)  When 12 weeks of Drisdol completed, continue multivitamin and start over-the-counter Vitamin D3 1,000-3,000 International Units daily  Check vitamin D level 1 week afterwards completing Drisdol  Relevant Orders    Comprehensive metabolic panel      Other Visit Diagnoses     Acute pain of right knee      Due to Prepatellar bursitis    Advise Motrin/ Tylenol p r n , ACE wrap / compression brace  Home exercise program given   Prepatellar bursitis, right knee      See above  Return in about 4 months (around 1/22/2022) for 4mo - Hyperlipidemia, Hypertriglyceridemia, Vitamin D Deficiency, Obesity, Labs  Future Appointments   Date Time Provider Nora Moody   1/24/2022  2:20 PM Scarlet Heard DO [place] [facility]   7/25/2022  3:00 PM Scarlet Heard DO  [facility]        Subjective:     Marli Crum is a 33 y o  male who presents today for a follow-up on his chronic medical conditions  HPI:  Chief Complaint   Patient presents with    Follow-up     R knee     Medication Refill     none     Labs Only     completed    HM     none      -- Above per clinical staff and reviewed  --      HPI      Today:    Return in about 6 weeks (around 9/22/2021) for F/U Hyperlipidemia, Hypertriglyceridemia, Vitamin D Deficiency, Obesity, Labs  F/U Hyperlipidemia, Hypertriglyceridemia, Vitamin D Deficiency, Obesity, Labs        Right Knee Pain - Symptoms x 2 weeks  He denies injury, but may have kneeled on a rock  Has decreased swelling on his kneecap, uanble to kneel on his kneecap  Inferior knee pain, swelling is around the knee cap  Currently 0/10, Max ?/10  Denies locking, popping, or giving way  No t using OTC meds        Obesity - Not watching diet x 2 weeks due to busy work scheduled   No regular exercise x 2 weeks  Previously was exercising - Floor Exercises 15-20 minutes, 6 days  x 2 weeks prior to knee pain       Hyperlipidemia - No statin previously        Hypertriglyceridemia - On Fish Oil 1200mg 2 tabs BID, ran out of Niacin 250mg QD x 2 weeks   No other Rx previously        Anxiety - Feels like he's coping well   Has increased work stress, 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, but worries about losing his health insurance in the process in the future    Stressed financially, also caring for young son  Shanti Norman brendon   Feels safe at home   Good social supports   No SI/HI/AH/VH  [facility] counseling 2018 - helpful   No mood meds previously   "I will not even attempt to be on medications for mood  "           PHQ-9 Depression Screening    PHQ-9:   Frequency of the following problems over the past two weeks:      Little interest or pleasure in doing things: 0 - not at all  Feeling down, depressed, or hopeless: 0 - not at all  Trouble falling or staying asleep, or sleeping too much: 0 - not at all  Feeling tired or having little energy: 0 - not at all  Poor appetite or overeatin - not at all  Feeling bad about yourself - or that you are a failure or have let yourself or your family down: 0 - not at all  Trouble concentrating on things, such as reading the newspaper or watching television: 0 - not at all  Moving or speaking so slowly that other people could have noticed  Or the opposite - being so fidgety or restless that you have been moving around a lot more than usual: 0 - not at all  Thoughts that you would be better off dead, or of hurting yourself in some way: 0 - not at all  PHQ-2 Score: 0  PHQ-9 Score: 0         BRUNO-7 Flowsheet Screening      Most Recent Value   Over the last 2 weeks, how often have you been bothered by any of the following problems? Feeling nervous, anxious, or on edge  1   Not being able to stop or control worrying  0   Worrying too much about different things  0   Trouble relaxing  1   Being so restless that it is hard to sit still  0   Becoming easily annoyed or irritable  1   Feeling afraid as if something awful might happen  0   BRUNO-7 Total Score  3           Vitamin D Deficiency - Taking Drisdol and MVI    Not taking OTC Vitamin D              Last Tetanus vaccine at [place] [address] in the last 10 years - 2822-8242               From previous note:        F/U Hyperlipidemia, Hypertriglyceridemia, Vitamin D Deficiency, Obesity, Labs           Obesity - Not watching diet   No regular exercise        Hyperlipidemia - No statin previously        Hypertriglyceridemia - On Fish Oil 1200mg 2 tabs BID, Niacin 250mg QD  No other Rx previously        Anxiety - Feels like he's coping well   Has increased work stress, 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, but worries about losing his health insurance in the process in the future  Stressed financially, also caring for young son  Mason olivas   Feels safe at home   Good social supports   No SI/HI/AH/VH  [facility] Jacobs counseling 2018 - helpful   No mood meds previously   "I will not even attempt to be on medications for mood  "          PHQ-9 Depression Screening    PHQ-9:   Frequency of the following problems over the past two weeks:      Little interest or pleasure in doing things: 0 - not at all  Feeling down, depressed, or hopeless: 0 - not at all  Trouble falling or staying asleep, or sleeping too much: 0 - not at all  Feeling tired or having little energy: 1 - several days  Poor appetite or overeatin - not at all  Feeling bad about yourself - or that you are a failure or have let yourself or your family down: 0 - not at all  Trouble concentrating on things, such as reading the newspaper or watching television: 0 - not at all  Moving or speaking so slowly that other people could have noticed  Or the opposite - being so fidgety or restless that you have been moving around a lot more than usual: 0 - not at all  Thoughts that you would be better off dead, or of hurting yourself in some way: 0 - not at all  PHQ-2 Score: 0  PHQ-9 Score: 1                BRUNO-7 Flowsheet Screening      Most Recent Value   Over the last 2 weeks, how often have you been bothered by any of the following problems?    Feeling nervous, anxious, or on edge  0   Not being able to stop or control worrying  0   Worrying too much about different things  0   Trouble relaxing  0   Being so restless that it is hard to sit still  0   Becoming easily annoyed or irritable 0   Feeling afraid as if something awful might happen  0   BRUNO-7 Total Score  0             Vitamin D Deficiency - Taking MVI, but not OTC Vitamin D              Last Tetanus vaccine at [address]  - 7 in the last 10 years - [id number]                 From previous note:     Controlled Substance Review     [place] PDMP or [place]  reviewed: No red flags were identified; safe to proceed with prescription        S/p Right OM 3 weeks ago, Rx Amoxicillin   He still feels that his ear is wet   Rx Singulair for possible Eustachian tube dysfunction?       Obesity - Not watching diet   No regular exercise        Hypertriglyceridemia - On Fish Oil 1200mg 2 tabs daily   No other Rx previously        Back Pain - Sees Chiro regularly   Smokes marijuana occasionally for pain relief        Hemorrhoids - s/p Colonoscopy in 2016 in [place], [place] ([address] name unknown)   Patient states repeat colonoscopy due at age 37yo        Anxiety  - Feels like he's coping well   Stressed financially, also caring for young son  Dayan olivas   Feels safe at home   Good social supports   No SI/HI/AH/VH  [facility] Chejennifer counseling 2018 - helpful   No mood meds previously   "I will not even attempt to be on medications for mood  "         Reviewed:  Labs 21     Overdue for Dentist   Overdue for Optho      PHQ-9 Depression Screening    PHQ-9:   Frequency of the following problems over the past two weeks:      Little interest or pleasure in doing things: 0 - not at all  Feeling down, depressed, or hopeless: 0 - not at all  Trouble falling or staying asleep, or sleeping too much: 0 - not at all  Feeling tired or having little energy: 0 - not at all  Poor appetite or overeatin - not at all  Feeling bad about yourself - or that you are a failure or have let yourself or your family down: 0 - not at all  Trouble concentrating on things, such as reading the newspaper or watching television: 0 - not at all  Moving or speaking so slowly that other people could have noticed  Or the opposite - being so fidgety or restless that you have been moving around a lot more than usual: 0 - not at all  Thoughts that you would be better off dead, or of hurting yourself in some way: 0 - not at all  PHQ-2 Score: 0  PHQ-9 Score: 0                  BRUNO-7 Flowsheet Screening      Most Recent Value   Over the last 2 weeks, how often have you been bothered by any of the following problems? Feeling nervous, anxious, or on edge  0   Not being able to stop or control worrying  0   Worrying too much about different things  1   Trouble relaxing  1   Being so restless that it is hard to sit still  0   Becoming easily annoyed or irritable  1   Feeling afraid as if something awful might happen  0   BRUNO-7 Total Score  3             MDQ:  1           Last Tetanus vaccine at [facility] [address]  - 7 in the last 10 years - [id number]         The following portions of the patient's history were reviewed and updated as appropriate: allergies, current medications, past family history, past medical history, past social history, past surgical history and problem list       Review of Systems   Constitutional: Negative for appetite change, chills, diaphoresis, fatigue and fever  Respiratory: Negative for chest tightness and shortness of breath  Cardiovascular: Negative for chest pain  Gastrointestinal: Negative for abdominal pain, blood in stool, diarrhea, nausea and vomiting  Genitourinary: Negative for dysuria  Musculoskeletal: Positive for arthralgias and joint swelling          Current Outpatient Medications   Medication Sig Dispense Refill    ergocalciferol (ERGOCALCIFEROL) 1 25 MG (30159 UT) capsule Take 1 capsule (50,000 Units total) by mouth once a week for 12 doses 12 capsule 0    Multiple Vitamin (MULTIVITAMIN) tablet Take 1 tablet by mouth daily       Omega-3 Fatty Acids (Fish Oil) 1200 MG CAPS Take 2 capsules by mouth 2 (two) times a day       fenofibrate 160 MG tablet Take 1 tablet (160 mg total) by mouth daily 30 tablet 1    niacin 100 mg tablet Take 250 mg by mouth daily with breakfast Twice a day (Patient not taking: Reported on 9/22/2021)       No current facility-administered medications for this visit  Objective:  /78   Pulse 86   Temp 98 °F (36 7 °C)   Resp 16   Ht 6' 4" (1 93 m)   Wt (!) 137 kg (302 lb 12 8 oz)   SpO2 97% Comment: on RA  BMI 36 86 kg/m²    Wt Readings from Last 3 Encounters:   09/22/21 (!) 137 kg (302 lb 12 8 oz)   08/11/21 (!) 138 kg (304 lb 3 2 oz)   07/22/21 (!) 138 kg (305 lb 3 2 oz)      BP Readings from Last 3 Encounters:   09/22/21 112/78   08/11/21 118/78   07/22/21 120/78          Physical Exam  Vitals and nursing note reviewed  Constitutional:       Appearance: Normal appearance  He is well-developed  He is obese  HENT:      Head: Normocephalic and atraumatic  Eyes:      Conjunctiva/sclera: Conjunctivae normal    Neck:      Thyroid: No thyromegaly  Cardiovascular:      Rate and Rhythm: Normal rate and regular rhythm  Heart sounds: Normal heart sounds  Pulmonary:      Effort: Pulmonary effort is normal       Breath sounds: Normal breath sounds  Musculoskeletal:         General: Swelling present  Cervical back: Neck supple  Right knee: Effusion (Prepatellar) present  No swelling, deformity, erythema, ecchymosis, lacerations, bony tenderness or crepitus  Normal range of motion  No tenderness  No LCL laxity, MCL laxity, ACL laxity or PCL laxity  Normal alignment, normal meniscus and normal patellar mobility  Instability Tests: Anterior drawer test negative  Posterior drawer test negative  Anterior Lachman test negative  Left knee: Normal       Right lower leg: No edema  Left lower leg: No edema  Neurological:      General: No focal deficit present  Mental Status: He is alert and oriented to person, place, and time     Psychiatric:         Mood and Affect: Mood normal          Behavior: Behavior normal          Thought Content: Thought content normal          Judgment: Judgment normal          Lab Results:      Lab Results   Component Value Date    WBC 4 90 07/30/2021    HGB 15 1 07/30/2021    HCT 43 9 07/30/2021     07/30/2021    TRIG 938 (H) 09/22/2021    HDL 31 (L) 09/22/2021    LDLDIRECT 62 09/22/2021    ALT 46 09/22/2021    AST 22 09/22/2021    K 4 0 09/22/2021     09/22/2021    CREATININE 1 01 09/22/2021    BUN 12 09/22/2021    CO2 25 09/22/2021    GLUF 121 (H) 09/22/2021    HGBA1C 5 5 09/22/2021     No results found for: URICACID  Invalid input(s): BASENAME Vitamin D    No results found  POCT Labs        Depression Screening and Follow-up Plan:   Patient was screened for depression during today's encounter  They screened negative with a PHQ-2 score of 0

## 2021-09-30 ENCOUNTER — PATIENT MESSAGE (OUTPATIENT)
Dept: FAMILY MEDICINE CLINIC | Facility: CLINIC | Age: 31
End: 2021-09-30

## 2021-09-30 ENCOUNTER — TELEPHONE (OUTPATIENT)
Dept: FAMILY MEDICINE CLINIC | Facility: CLINIC | Age: 31
End: 2021-09-30

## 2021-09-30 DIAGNOSIS — B34.9 VIRAL INFECTION, UNSPECIFIED: Primary | ICD-10-CM

## 2021-09-30 PROCEDURE — U0003 INFECTIOUS AGENT DETECTION BY NUCLEIC ACID (DNA OR RNA); SEVERE ACUTE RESPIRATORY SYNDROME CORONAVIRUS 2 (SARS-COV-2) (CORONAVIRUS DISEASE [COVID-19]), AMPLIFIED PROBE TECHNIQUE, MAKING USE OF HIGH THROUGHPUT TECHNOLOGIES AS DESCRIBED BY CMS-2020-01-R: HCPCS | Performed by: FAMILY MEDICINE

## 2021-09-30 PROCEDURE — U0005 INFEC AGEN DETEC AMPLI PROBE: HCPCS | Performed by: FAMILY MEDICINE

## 2021-09-30 NOTE — TELEPHONE ENCOUNTER
From: Sia Eaton  To: Kacey Mccormick DO  Sent: 9/30/2021 10:20 AM EDT  Subject: Prescription Question    Hello,   Since I been on fenofibrate the I been noticing exhaustion slowly getting worse and not wanting to get up  Last 2 days I have had a low grade headache(pulsating) and lightheadedness which has gotten slowly worse and today I'm having muscle sorness that started in hip on left spread to right and now shoulders are sore and it not from work  I have also feeling some nausea but in random little spurts  It has gotten to point I will most likely have to leave work early today  Should I stop taking? Also ran out of omega and willing to go with the medical grade form of that

## 2021-09-30 NOTE — TELEPHONE ENCOUNTER
Please schedule for virtual visit as these may be COVID symptoms are not Fenofibrate medication side effects  Patient is not vaccinated  See orders for COVID testing to be done at tent  Patient should be quarantining in interim

## 2021-09-30 NOTE — TELEPHONE ENCOUNTER
Phone call placed to pt, Patient went to [facility] pharmacy for a Rapid COVID test  Patient states that it is negative  He will send the results over via my chart  Patient states that now that he has been home he is feeling fine but  Has a HA and shoulder pain  Pt states that his HA Come and go when he working  Patient states that he is  Also having a nauseas feeling  Pt states that he still has a HA on and off today  Weakness did start in his hip  Patient sates that the weakness travailed to his shoulders  Patient does have a Virtual appt on 10/1/2021 to be seen

## 2021-09-30 NOTE — TELEPHONE ENCOUNTER
Patient started the Fenofibrate and he is having some reactions such as: muscle aches, soreness in joints, headache pulsating on/off two days, nausea on/off, lightheadedness,  symptoms started yesterday but still having today

## 2021-10-01 ENCOUNTER — TELEMEDICINE (OUTPATIENT)
Dept: FAMILY MEDICINE CLINIC | Facility: CLINIC | Age: 31
End: 2021-10-01
Payer: COMMERCIAL

## 2021-10-01 VITALS — TEMPERATURE: 98.5 F

## 2021-10-01 DIAGNOSIS — B34.9 VIRAL INFECTION, UNSPECIFIED: ICD-10-CM

## 2021-10-01 DIAGNOSIS — E66.01 CLASS 2 SEVERE OBESITY WITH SERIOUS COMORBIDITY AND BODY MASS INDEX (BMI) OF 36.0 TO 36.9 IN ADULT, UNSPECIFIED OBESITY TYPE (HCC): ICD-10-CM

## 2021-10-01 DIAGNOSIS — E78.1 HYPERTRIGLYCERIDEMIA: Primary | ICD-10-CM

## 2021-10-01 DIAGNOSIS — E78.49 OTHER HYPERLIPIDEMIA: ICD-10-CM

## 2021-10-01 PROCEDURE — 99214 OFFICE O/P EST MOD 30 MIN: CPT | Performed by: FAMILY MEDICINE

## 2021-10-01 RX ORDER — OMEGA-3-ACID ETHYL ESTERS 1 G/1
2 CAPSULE, LIQUID FILLED ORAL 2 TIMES DAILY
Qty: 120 CAPSULE | Refills: 1 | Status: SHIPPED | OUTPATIENT
Start: 2021-10-01

## 2021-10-26 ENCOUNTER — OFFICE VISIT (OUTPATIENT)
Dept: FAMILY MEDICINE CLINIC | Facility: CLINIC | Age: 31
End: 2021-10-26
Payer: COMMERCIAL

## 2021-10-26 VITALS
OXYGEN SATURATION: 98 % | HEIGHT: 76 IN | DIASTOLIC BLOOD PRESSURE: 78 MMHG | RESPIRATION RATE: 16 BRPM | BODY MASS INDEX: 36.31 KG/M2 | WEIGHT: 298.2 LBS | TEMPERATURE: 97.8 F | HEART RATE: 76 BPM | SYSTOLIC BLOOD PRESSURE: 106 MMHG

## 2021-10-26 DIAGNOSIS — B34.9 VIRAL INFECTION, UNSPECIFIED: Primary | ICD-10-CM

## 2021-10-26 DIAGNOSIS — E66.01 CLASS 2 SEVERE OBESITY WITH SERIOUS COMORBIDITY AND BODY MASS INDEX (BMI) OF 36.0 TO 36.9 IN ADULT, UNSPECIFIED OBESITY TYPE (HCC): ICD-10-CM

## 2021-10-26 DIAGNOSIS — J02.9 PHARYNGITIS, UNSPECIFIED ETIOLOGY: ICD-10-CM

## 2021-10-26 DIAGNOSIS — H92.03 OTALGIA OF BOTH EARS: ICD-10-CM

## 2021-10-26 DIAGNOSIS — J02.0 STREP PHARYNGITIS: ICD-10-CM

## 2021-10-26 LAB — S PYO AG THROAT QL: NEGATIVE

## 2021-10-26 PROCEDURE — 87070 CULTURE OTHR SPECIMN AEROBIC: CPT | Performed by: FAMILY MEDICINE

## 2021-10-26 PROCEDURE — U0005 INFEC AGEN DETEC AMPLI PROBE: HCPCS | Performed by: FAMILY MEDICINE

## 2021-10-26 PROCEDURE — 1036F TOBACCO NON-USER: CPT | Performed by: FAMILY MEDICINE

## 2021-10-26 PROCEDURE — 3008F BODY MASS INDEX DOCD: CPT | Performed by: FAMILY MEDICINE

## 2021-10-26 PROCEDURE — U0003 INFECTIOUS AGENT DETECTION BY NUCLEIC ACID (DNA OR RNA); SEVERE ACUTE RESPIRATORY SYNDROME CORONAVIRUS 2 (SARS-COV-2) (CORONAVIRUS DISEASE [COVID-19]), AMPLIFIED PROBE TECHNIQUE, MAKING USE OF HIGH THROUGHPUT TECHNOLOGIES AS DESCRIBED BY CMS-2020-01-R: HCPCS | Performed by: FAMILY MEDICINE

## 2021-10-26 PROCEDURE — 3725F SCREEN DEPRESSION PERFORMED: CPT | Performed by: FAMILY MEDICINE

## 2021-10-26 PROCEDURE — 87880 STREP A ASSAY W/OPTIC: CPT | Performed by: FAMILY MEDICINE

## 2021-10-26 PROCEDURE — 87147 CULTURE TYPE IMMUNOLOGIC: CPT | Performed by: FAMILY MEDICINE

## 2021-10-26 PROCEDURE — 99214 OFFICE O/P EST MOD 30 MIN: CPT | Performed by: FAMILY MEDICINE

## 2021-10-27 LAB — SARS-COV-2 RNA RESP QL NAA+PROBE: NEGATIVE

## 2021-10-29 LAB — BACTERIA THROAT CULT: ABNORMAL

## 2021-10-29 RX ORDER — AZITHROMYCIN 250 MG/1
TABLET, FILM COATED ORAL
Qty: 6 TABLET | Refills: 0 | Status: SHIPPED | OUTPATIENT
Start: 2021-10-29 | End: 2021-10-29

## 2021-11-18 ENCOUNTER — PATIENT MESSAGE (OUTPATIENT)
Dept: FAMILY MEDICINE CLINIC | Facility: CLINIC | Age: 31
End: 2021-11-18

## 2021-11-18 ENCOUNTER — LAB (OUTPATIENT)
Dept: LAB | Facility: CLINIC | Age: 31
End: 2021-11-18
Payer: COMMERCIAL

## 2021-11-18 DIAGNOSIS — E78.1 HYPERTRIGLYCERIDEMIA: ICD-10-CM

## 2021-11-18 LAB
25(OH)D3 SERPL-MCNC: 15.5 NG/ML (ref 30–100)
ALBUMIN SERPL BCP-MCNC: 3.8 G/DL (ref 3.5–5)
ALP SERPL-CCNC: 67 U/L (ref 46–116)
ALT SERPL W P-5'-P-CCNC: 56 U/L (ref 12–78)
ANION GAP SERPL CALCULATED.3IONS-SCNC: 15 MMOL/L (ref 4–13)
AST SERPL W P-5'-P-CCNC: 37 U/L (ref 5–45)
BILIRUB SERPL-MCNC: 0.66 MG/DL (ref 0.2–1)
BUN SERPL-MCNC: 18 MG/DL (ref 5–25)
CALCIUM SERPL-MCNC: 8.7 MG/DL (ref 8.3–10.1)
CHLORIDE SERPL-SCNC: 102 MMOL/L (ref 100–108)
CO2 SERPL-SCNC: 19 MMOL/L (ref 21–32)
CREAT SERPL-MCNC: 0.97 MG/DL (ref 0.6–1.3)
GFR SERPL CREATININE-BSD FRML MDRD: 102 ML/MIN/1.73SQ M
GLUCOSE P FAST SERPL-MCNC: 111 MG/DL (ref 65–99)
POTASSIUM SERPL-SCNC: 4.6 MMOL/L (ref 3.5–5.3)
PROT SERPL-MCNC: 8.1 G/DL (ref 6.4–8.2)
SODIUM SERPL-SCNC: 136 MMOL/L (ref 136–145)
TRIGL SERPL-MCNC: 1997 MG/DL

## 2021-11-18 PROCEDURE — 82306 VITAMIN D 25 HYDROXY: CPT

## 2021-11-18 PROCEDURE — 36415 COLL VENOUS BLD VENIPUNCTURE: CPT

## 2021-11-18 PROCEDURE — 84478 ASSAY OF TRIGLYCERIDES: CPT

## 2021-11-18 PROCEDURE — 80053 COMPREHEN METABOLIC PANEL: CPT

## 2021-11-18 NOTE — RESULT ENCOUNTER NOTE
Vitamin D Deficiency -  Recurrent and worsening  Recommend start multivitamin and prescription vitamin D (Drisdol)  When 12 weeks of Drisdol completed, continue multivitamin and start over-the-counter Vitamin D3 1,000-3,000 International Units daily  Check vitamin D level 1 week afterwards completing Drisdol  ERx sent  Nurse to see lab orders  Message sent to patient via [facility] patient portal     Nurse to also call patient

## 2021-12-03 ENCOUNTER — CONSULT (OUTPATIENT)
Dept: CARDIOLOGY CLINIC | Facility: CLINIC | Age: 31
End: 2021-12-03
Payer: COMMERCIAL

## 2021-12-03 VITALS
DIASTOLIC BLOOD PRESSURE: 82 MMHG | OXYGEN SATURATION: 97 % | BODY MASS INDEX: 36.86 KG/M2 | SYSTOLIC BLOOD PRESSURE: 132 MMHG | HEIGHT: 76 IN | WEIGHT: 302.7 LBS | HEART RATE: 95 BPM

## 2021-12-03 DIAGNOSIS — E78.1 HYPERTRIGLYCERIDEMIA: Primary | ICD-10-CM

## 2021-12-03 DIAGNOSIS — E66.01 CLASS 2 SEVERE OBESITY DUE TO EXCESS CALORIES WITH SERIOUS COMORBIDITY AND BODY MASS INDEX (BMI) OF 36.0 TO 36.9 IN ADULT (HCC): ICD-10-CM

## 2021-12-03 PROCEDURE — 99244 OFF/OP CNSLTJ NEW/EST MOD 40: CPT | Performed by: INTERNAL MEDICINE

## 2021-12-03 PROCEDURE — 1036F TOBACCO NON-USER: CPT | Performed by: INTERNAL MEDICINE

## 2021-12-03 PROCEDURE — 3008F BODY MASS INDEX DOCD: CPT | Performed by: INTERNAL MEDICINE

## 2021-12-03 PROCEDURE — 93000 ELECTROCARDIOGRAM COMPLETE: CPT | Performed by: INTERNAL MEDICINE

## 2022-08-25 ENCOUNTER — TELEPHONE (OUTPATIENT)
Dept: FAMILY MEDICINE CLINIC | Facility: CLINIC | Age: 32
End: 2022-08-25

## 2022-08-25 DIAGNOSIS — Z77.011 LEAD EXPOSURE: Primary | ICD-10-CM

## 2022-09-14 ENCOUNTER — APPOINTMENT (OUTPATIENT)
Dept: RADIOLOGY | Facility: MEDICAL CENTER | Age: 32
End: 2022-09-14
Payer: COMMERCIAL

## 2022-09-14 ENCOUNTER — OFFICE VISIT (OUTPATIENT)
Dept: URGENT CARE | Facility: MEDICAL CENTER | Age: 32
End: 2022-09-14
Payer: COMMERCIAL

## 2022-09-14 VITALS
OXYGEN SATURATION: 97 % | RESPIRATION RATE: 18 BRPM | WEIGHT: 305 LBS | HEIGHT: 76 IN | TEMPERATURE: 98.2 F | HEART RATE: 94 BPM | BODY MASS INDEX: 37.14 KG/M2

## 2022-09-14 DIAGNOSIS — M25.562 ACUTE PAIN OF LEFT KNEE: Primary | ICD-10-CM

## 2022-09-14 DIAGNOSIS — M25.562 ACUTE PAIN OF LEFT KNEE: ICD-10-CM

## 2022-09-14 PROCEDURE — G0382 LEV 3 HOSP TYPE B ED VISIT: HCPCS | Performed by: PHYSICIAN ASSISTANT

## 2022-09-14 PROCEDURE — 73564 X-RAY EXAM KNEE 4 OR MORE: CPT

## 2022-09-14 RX ORDER — SULFAMETHOXAZOLE AND TRIMETHOPRIM 800; 160 MG/1; MG/1
1 TABLET ORAL EVERY 12 HOURS SCHEDULED
Qty: 20 TABLET | Refills: 0 | Status: SHIPPED | OUTPATIENT
Start: 2022-09-14 | End: 2022-09-22 | Stop reason: SDUPTHER

## 2022-09-14 RX ORDER — OMEGA-3S/DHA/EPA/FISH OIL/D3 300MG-1000
400 CAPSULE ORAL DAILY
COMMUNITY

## 2022-09-14 RX ORDER — IBUPROFEN 800 MG/1
800 TABLET ORAL EVERY 6 HOURS PRN
Qty: 30 TABLET | Refills: 0 | Status: SHIPPED | OUTPATIENT
Start: 2022-09-14

## 2022-09-14 NOTE — LETTER
September 14, 2022     Patient: Yolanda Stern   YOB: 1990   Date of Visit: 9/14/2022       To Whom it May Concern:    Yolanda Stern was seen in my clinic on 9/14/2022  He may return to work on 9/15/2022  If you have any questions or concerns, please don't hesitate to call           Sincerely,          Charol Carrel, PA-C        CC: No Recipients

## 2022-09-14 NOTE — PROGRESS NOTES
[email or web address] [facility]        NAME: Hadley Khanna is a 27 y o  male  : 1990    MRN: [id number]  DATE: 2022  TIME: 1:58 PM    Assessment and Plan   Acute pain of left knee [M25 562]  1  Acute pain of left knee  XR knee 4+ vw left injury    ibuprofen (MOTRIN) 800 mg tablet    Ambulatory Referral to Orthopedic Surgery    sulfamethoxazole-trimethoprim (BACTRIM DS) 800-160 mg per tablet   1  Acute pain of left knee    - XR knee 4+ vw left injury; Future  - ibuprofen (MOTRIN) 800 mg tablet; Take 1 tablet (800 mg total) by mouth every 6 (six) hours as needed for mild pain (Take as needed with meals or snack)  Dispense: 30 tablet; Refill: 0  - Ambulatory Referral to Orthopedic Surgery; Future  - sulfamethoxazole-trimethoprim (BACTRIM DS) 800-160 mg per tablet; Take 1 tablet by mouth every 12 (twelve) hours for 10 days  Dispense: 20 tablet; Refill: 0      Patient Instructions   Treatment  will depend on the cause of your pain  You may need any of the following:  · NSAIDs  help decrease swelling and pain or fever  This medicine is available with or without a doctor's order  NSAIDs can cause stomach bleeding or kidney problems in certain people  If you take blood thinner medicine, always ask your healthcare provider if NSAIDs are safe for you  Always read the medicine label and follow directions  · Acetaminophen  decreases pain and fever  It is available without a doctor's order  Ask how much to take and how often to take it  Follow directions  Read the labels of all other medicines you are using to see if they also contain acetaminophen, or ask your doctor or pharmacist  Acetaminophen can cause liver damage if not taken correctly  Do not use more than 4 grams (4,000 milligrams) total of acetaminophen in one day  · Prescription pain medicine  may be given  Ask your healthcare provider how to take this medicine safely  Some prescription pain medicines contain acetaminophen   Do not take other medicines that contain acetaminophen without talking to your healthcare provider  Too much acetaminophen may cause liver damage  Prescription pain medicine may cause constipation  Ask your healthcare provider how to prevent or treat constipation  · Steroid injections  may be given into your knee  Steroids reduce inflammation and pain  · Surgery  may be used for some injuries, such as to repair a torn ACL  What you can do to manage your symptoms:   · Rest your knee so it can heal   Limit activities that increase your pain  Do low-impact exercises, such as walking or swimming  · Apply ice to help reduce swelling and pain  Use an ice pack, or put crushed ice in a plastic bag  Cover it with a towel before you apply it to your knee  Apply ice for 15 to 20 minutes every hour, or as directed  · Apply compression to help reduce swelling  Use a brace or bandage only as directed  · Elevate your knee to help decrease pain and swelling  Elevate your knee while you are sitting or lying down  Prop your leg on pillows to keep your knee above the level of your heart  Follow up with PCP in 3-5 days  Proceed to  ER if symptoms worsen  Chief Complaint     Chief Complaint   Patient presents with    Knee Pain     Pt states he has had water on his left knee for past 2 weeks, states he injured it the other day and it has flaired up again          History of Present Illness       26-year-old male is presenting with complaint of left knee pain associated with swelling for approximately 2 weeks  The patient states that he had accidentally kneel down on a small rock which causes the incident approximately 2 weeks ago  His symptoms were resolving with home modalities however left knee swelled again after a sudden step and twisted motion 3 days ago  He denies a history of meniscus tears, prior left knee injuries, or surgeries      Knee Pain         Review of Systems   Review of Systems   Constitutional: Negative for activity change, appetite change and fever  Eyes: Negative for photophobia and visual disturbance  Respiratory: Negative for cough, shortness of breath and wheezing  Gastrointestinal: Negative for abdominal pain, diarrhea and rectal pain  Musculoskeletal: Negative for gait problem  Skin: Negative for color change and rash  Psychiatric/Behavioral: Negative            Current Medications       Current Outpatient Medications:     cholecalciferol (VITAMIN D3) 400 units tablet, Take 400 Units by mouth daily, Disp: , Rfl:     ibuprofen (MOTRIN) 800 mg tablet, Take 1 tablet (800 mg total) by mouth every 6 (six) hours as needed for mild pain (Take as needed with meals or snack), Disp: 30 tablet, Rfl: 0    Multiple Vitamin (MULTIVITAMIN) tablet, Take 1 tablet by mouth daily , Disp: , Rfl:     sulfamethoxazole-trimethoprim (BACTRIM DS) 800-160 mg per tablet, Take 1 tablet by mouth every 12 (twelve) hours for 10 days, Disp: 20 tablet, Rfl: 0    ergocalciferol (ERGOCALCIFEROL) 1 25 MG (26545 UT) capsule, Take 1 capsule (50,000 Units total) by mouth once a week for 12 doses, Disp: 12 capsule, Rfl: 0    omega-3-acid ethyl esters (LOVAZA) 1 g capsule, Take 2 capsules (2 g total) by mouth 2 (two) times a day (Patient not taking: Reported on 12/3/2021 ), Disp: 120 capsule, Rfl: 1    Current Allergies     Allergies as of 09/14/2022 - Reviewed 09/14/2022   Allergen Reaction Noted    Amoxicillin Swelling 07/22/2021            The following portions of the patient's history were reviewed and updated as appropriate: allergies, current medications, past family history, past medical history, past social history, past surgical history and problem list      Past Medical History:   Diagnosis Date    Anxiety 07/22/2021    Back pain     See Chiro regularly    Class 2 severe obesity with serious comorbidity and body mass index (BMI) of 37 0 to 37 9 in [facility])     History of asthma     Symptomatic when smoking  Last used inhaler in 2011   Hypertriglyceridemia     Other hemorrhoids     Other hyperlipidemia     Vitamin D deficiency        Past Surgical History:   Procedure Laterality Date    COLONOSCOPY  2016    +Hemorrhoids       Family History   Problem Relation Age of Onset    Hyperlipidemia Mother     Restless legs syndrome Mother     No Known Problems Father     ADD / ADHD Brother     No Known Problems Son          Medications have been verified  Objective   Pulse 94   Temp 98 2 °F (36 8 °C) (Temporal)   Resp 18   Ht 6' 4" (1 93 m)   Wt (!) 138 kg (305 lb)   SpO2 97%   BMI 37 13 kg/m²        Physical Exam     Physical Exam  Constitutional:       General: He is not in acute distress  Appearance: Normal appearance  He is not ill-appearing  HENT:      Head: Normocephalic and atraumatic  Right Ear: External ear normal       Left Ear: External ear normal       Nose: Nose normal       Mouth/Throat:      Mouth: Mucous membranes are moist    Eyes:      General: No scleral icterus  Extraocular Movements: Extraocular movements intact  Conjunctiva/sclera: Conjunctivae normal       Pupils: Pupils are equal, round, and reactive to light  Cardiovascular:      Rate and Rhythm: Normal rate and regular rhythm  Pulses: Normal pulses  Pulmonary:      Effort: Pulmonary effort is normal  No respiratory distress  Breath sounds: Normal breath sounds  Musculoskeletal:      Cervical back: Normal range of motion and neck supple  Legs:       Comments: Normal gait  Left knee is noted to have slight inflammation and swelling  There is warmth  There is no tenderness on palpation  There is full range of motion on extension and flexion without difficulty or restrictions  No popliteal tenderness  Nash is negative  Stress tests of ACL and MCL is negative, negative draw sign  Pulses are 2+ and symmetric    His right knee is normal    Skin:     Capillary Refill: Capillary refill takes less than 2 seconds  Neurological:      Mental Status: He is alert and oriented to person, place, and time  Gait: Gait normal    Psychiatric:         Mood and Affect: Mood normal          Behavior: Behavior normal          Thought Content: Thought content normal          Judgment: Judgment normal        Preliminary x-ray of the left knee was reviewed which was unremarkable for fractures or dislocation  Official report is pending  Patient was informed  Note was amended to provide a note for work

## 2022-09-14 NOTE — PATIENT INSTRUCTIONS
Knee Pain   AMBULATORY CARE:   Knee pain  may start suddenly, or it may be a long-term problem  You may have pain on the side, front, or back of your knee  You may have knee stiffness and swelling  You may hear popping sounds or feel like your knee is giving way or locking up as you walk  You may feel pain when you sit, stand, walk, or climb up and down stairs  Knee pain can be caused by conditions such as obesity, inflammation, or strains or tears in ligaments or tendons  Seek care immediately if:   Your pain is worse, even after treatment  You cannot bend or straighten your leg completely  The swelling around your knee does not go down even with treatment  Your knee is painful and hot to the touch  Contact your healthcare provider if:   You have questions or concerns about your condition or care  Treatment  will depend on the cause of your pain  You may need any of the following:  NSAIDs  help decrease swelling and pain or fever  This medicine is available with or without a doctor's order  NSAIDs can cause stomach bleeding or kidney problems in certain people  If you take blood thinner medicine, always ask your healthcare provider if NSAIDs are safe for you  Always read the medicine label and follow directions  Acetaminophen  decreases pain and fever  It is available without a doctor's order  Ask how much to take and how often to take it  Follow directions  Read the labels of all other medicines you are using to see if they also contain acetaminophen, or ask your doctor or pharmacist  Acetaminophen can cause liver damage if not taken correctly  Do not use more than 4 grams (4,000 milligrams) total of acetaminophen in one day  Prescription pain medicine  may be given  Ask your healthcare provider how to take this medicine safely  Some prescription pain medicines contain acetaminophen  Do not take other medicines that contain acetaminophen without talking to your healthcare provider   Too much acetaminophen may cause liver damage  Prescription pain medicine may cause constipation  Ask your healthcare provider how to prevent or treat constipation  Steroid injections  may be given into your knee  Steroids reduce inflammation and pain  Surgery  may be used for some injuries, such as to repair a torn ACL  What you can do to manage your symptoms:   Rest your knee so it can heal   Limit activities that increase your pain  Do low-impact exercises, such as walking or swimming  Apply ice to help reduce swelling and pain  Use an ice pack, or put crushed ice in a plastic bag  Cover it with a towel before you apply it to your knee  Apply ice for 15 to 20 minutes every hour, or as directed  Apply compression to help reduce swelling  Use a brace or bandage only as directed  Elevate your knee to help decrease pain and swelling  Elevate your knee while you are sitting or lying down  Prop your leg on pillows to keep your knee above the level of your heart  Prevent your knee from moving as directed  Your healthcare provider may put on a cast or splint  You may need to wear a leg brace to stabilize your knee  A leg brace can be adjusted to increase your range of motion as your knee heals  What you can do to prevent knee pain:   Maintain a healthy weight  Extra weight increases your risk for knee pain  Ask your healthcare provider how much you should weigh  He or she can help you create a safe weight loss plan if you need to lose weight  Exercise or train properly  Use the correct equipment for sports  Wear shoes that provide good support  Check your posture often as you exercise, play sports, or train for an event  This can help prevent stress and strain on your knees  Rest between sessions so you do not overwork your knees  Follow up with your healthcare provider within 24 hours or as directed:  Write down your questions so you remember to ask them during your visits     © Copyright [employer] 2022 Information is for End User's use only and may not be sold, redistributed or otherwise used for commercial purposes  All illustrations and images included in CareNotes® are the copyrighted property of [employer]  or [employer]  The above information is an  only  It is not intended as medical advice for individual conditions or treatments  Talk to your doctor, nurse or pharmacist before following any medical regimen to see if it is safe and effective for you

## 2022-09-16 ENCOUNTER — HOSPITAL ENCOUNTER (INPATIENT)
Facility: HOSPITAL | Age: 32
LOS: 1 days | Discharge: HOME | End: 2022-09-18
Attending: EMERGENCY MEDICINE | Admitting: INTERNAL MEDICINE
Payer: COMMERCIAL

## 2022-09-16 ENCOUNTER — OFFICE VISIT (OUTPATIENT)
Dept: OBGYN CLINIC | Facility: MEDICAL CENTER | Age: 32
End: 2022-09-16
Payer: COMMERCIAL

## 2022-09-16 VITALS
WEIGHT: 313.6 LBS | HEART RATE: 69 BPM | SYSTOLIC BLOOD PRESSURE: 135 MMHG | BODY MASS INDEX: 38.19 KG/M2 | DIASTOLIC BLOOD PRESSURE: 77 MMHG | HEIGHT: 76 IN

## 2022-09-16 DIAGNOSIS — M25.562 ACUTE PAIN OF LEFT KNEE: ICD-10-CM

## 2022-09-16 DIAGNOSIS — M71.162 SEPTIC PREPATELLAR BURSITIS OF LEFT KNEE: Primary | ICD-10-CM

## 2022-09-16 DIAGNOSIS — L03.90 CELLULITIS: ICD-10-CM

## 2022-09-16 DIAGNOSIS — M70.42 PREPATELLAR BURSITIS OF LEFT KNEE: Primary | ICD-10-CM

## 2022-09-16 PROBLEM — L03.116 CELLULITIS OF LEFT LOWER EXTREMITY: Status: ACTIVE | Noted: 2022-09-16

## 2022-09-16 LAB
ANION GAP SERPL CALCULATED.3IONS-SCNC: 8 MMOL/L (ref 4–13)
BASOPHILS # BLD AUTO: 0.03 THOUSANDS/ΜL (ref 0–0.1)
BASOPHILS NFR BLD AUTO: 1 % (ref 0–1)
BUN SERPL-MCNC: 15 MG/DL (ref 5–25)
CALCIUM SERPL-MCNC: 9 MG/DL (ref 8.4–10.2)
CHLORIDE SERPL-SCNC: 104 MMOL/L (ref 96–108)
CO2 SERPL-SCNC: 28 MMOL/L (ref 21–32)
CREAT SERPL-MCNC: 1.03 MG/DL (ref 0.6–1.3)
EOSINOPHIL # BLD AUTO: 0.3 THOUSAND/ΜL (ref 0–0.61)
EOSINOPHIL NFR BLD AUTO: 5 % (ref 0–6)
ERYTHROCYTE [DISTWIDTH] IN BLOOD BY AUTOMATED COUNT: 12.1 % (ref 11.6–15.1)
GFR SERPL CREATININE-BSD FRML MDRD: 94 ML/MIN/1.73SQ M
GLUCOSE SERPL-MCNC: 96 MG/DL (ref 65–140)
HCT VFR BLD AUTO: 39.9 % (ref 36.5–49.3)
HGB BLD-MCNC: 13.1 G/DL (ref 12–17)
IMM GRANULOCYTES # BLD AUTO: 0.03 THOUSAND/UL (ref 0–0.2)
IMM GRANULOCYTES NFR BLD AUTO: 1 % (ref 0–2)
LYMPHOCYTES # BLD AUTO: 2 THOUSANDS/ΜL (ref 0.6–4.47)
LYMPHOCYTES NFR BLD AUTO: 31 % (ref 14–44)
MCH RBC QN AUTO: 29.8 PG (ref 26.8–34.3)
MCHC RBC AUTO-ENTMCNC: 32.8 G/DL (ref 31.4–37.4)
MCV RBC AUTO: 91 FL (ref 82–98)
MONOCYTES # BLD AUTO: 0.6 THOUSAND/ΜL (ref 0.17–1.22)
MONOCYTES NFR BLD AUTO: 9 % (ref 4–12)
NEUTROPHILS # BLD AUTO: 3.6 THOUSANDS/ΜL (ref 1.85–7.62)
NEUTS SEG NFR BLD AUTO: 53 % (ref 43–75)
NRBC BLD AUTO-RTO: 0 /100 WBCS
PLATELET # BLD AUTO: 281 THOUSANDS/UL (ref 149–390)
PMV BLD AUTO: 9.4 FL (ref 8.9–12.7)
POTASSIUM SERPL-SCNC: 3.7 MMOL/L (ref 3.5–5.3)
RBC # BLD AUTO: 4.4 MILLION/UL (ref 3.88–5.62)
SODIUM SERPL-SCNC: 140 MMOL/L (ref 135–147)
URATE SERPL-MCNC: 6.9 MG/DL (ref 3.5–8.5)
WBC # BLD AUTO: 6.56 THOUSAND/UL (ref 4.31–10.16)

## 2022-09-16 PROCEDURE — 99285 EMERGENCY DEPT VISIT HI MDM: CPT

## 2022-09-16 PROCEDURE — 36415 COLL VENOUS BLD VENIPUNCTURE: CPT | Performed by: PHYSICIAN ASSISTANT

## 2022-09-16 PROCEDURE — 99285 EMERGENCY DEPT VISIT HI MDM: CPT | Performed by: PHYSICIAN ASSISTANT

## 2022-09-16 PROCEDURE — 96367 TX/PROPH/DG ADDL SEQ IV INF: CPT

## 2022-09-16 PROCEDURE — 87040 BLOOD CULTURE FOR BACTERIA: CPT | Performed by: PHYSICIAN ASSISTANT

## 2022-09-16 PROCEDURE — 99219 PR INITIAL OBSERVATION CARE/DAY 50 MINUTES: CPT | Performed by: PHYSICIAN ASSISTANT

## 2022-09-16 PROCEDURE — 84550 ASSAY OF BLOOD/URIC ACID: CPT | Performed by: PHYSICIAN ASSISTANT

## 2022-09-16 PROCEDURE — 96365 THER/PROPH/DIAG IV INF INIT: CPT

## 2022-09-16 PROCEDURE — 80048 BASIC METABOLIC PNL TOTAL CA: CPT | Performed by: PHYSICIAN ASSISTANT

## 2022-09-16 PROCEDURE — 85025 COMPLETE CBC W/AUTO DIFF WBC: CPT | Performed by: PHYSICIAN ASSISTANT

## 2022-09-16 PROCEDURE — 99204 OFFICE O/P NEW MOD 45 MIN: CPT | Performed by: ORTHOPAEDIC SURGERY

## 2022-09-16 RX ORDER — CEFTRIAXONE 2 G/50ML
2000 INJECTION, SOLUTION INTRAVENOUS EVERY 24 HOURS
Status: DISCONTINUED | OUTPATIENT
Start: 2022-09-17 | End: 2022-09-18 | Stop reason: HOSPADM

## 2022-09-16 RX ORDER — SODIUM CHLORIDE 9 MG/ML
125 INJECTION, SOLUTION INTRAVENOUS CONTINUOUS
Status: DISCONTINUED | OUTPATIENT
Start: 2022-09-16 | End: 2022-09-16

## 2022-09-16 RX ORDER — NIACIN 100 MG
500 TABLET ORAL 2 TIMES DAILY WITH MEALS
Status: DISCONTINUED | OUTPATIENT
Start: 2022-09-17 | End: 2022-09-18 | Stop reason: HOSPADM

## 2022-09-16 RX ORDER — NIACIN 500 MG
500 TABLET ORAL 2 TIMES DAILY WITH MEALS
COMMUNITY

## 2022-09-16 RX ORDER — CHLORAL HYDRATE 500 MG
2000 CAPSULE ORAL 2 TIMES DAILY
Status: DISCONTINUED | OUTPATIENT
Start: 2022-09-17 | End: 2022-09-18 | Stop reason: HOSPADM

## 2022-09-16 RX ORDER — ACETAMINOPHEN 325 MG/1
650 TABLET ORAL EVERY 6 HOURS PRN
Status: DISCONTINUED | OUTPATIENT
Start: 2022-09-16 | End: 2022-09-18 | Stop reason: HOSPADM

## 2022-09-16 RX ORDER — CEFEPIME HYDROCHLORIDE 2 G/50ML
2000 INJECTION, SOLUTION INTRAVENOUS ONCE
Status: COMPLETED | OUTPATIENT
Start: 2022-09-16 | End: 2022-09-16

## 2022-09-16 RX ORDER — ENOXAPARIN SODIUM 100 MG/ML
40 INJECTION SUBCUTANEOUS DAILY
Status: DISCONTINUED | OUTPATIENT
Start: 2022-09-17 | End: 2022-09-18 | Stop reason: HOSPADM

## 2022-09-16 RX ADMIN — VANCOMYCIN HYDROCHLORIDE 2000 MG: 10 INJECTION, POWDER, LYOPHILIZED, FOR SOLUTION INTRAVENOUS at 20:17

## 2022-09-16 RX ADMIN — CEFEPIME HYDROCHLORIDE 2000 MG: 2 INJECTION, SOLUTION INTRAVENOUS at 19:36

## 2022-09-16 RX ADMIN — SODIUM CHLORIDE 125 ML/HR: 0.9 INJECTION, SOLUTION INTRAVENOUS at 19:31

## 2022-09-16 NOTE — PROGRESS NOTES
Assessment/Plan     1  Septic prepatellar bursitis of left knee      No orders of the defined types were placed in this encounter     -Mr Gato Alexis has L septic prepatellar bursitis, has been on bactrim 2 days without change  -recommend to go to ER  Patient prefers [facility] due to proximity to home    Return for To go to ER  I answered all of the patient's questions during the visit and provided education of the patient's condition during the visit  The patient verbalized understanding of the information given and agrees with the plan  This note was dictated using [employer] Chinese software  It may contain errors including improperly dictated words  Please contact physician directly for any questions  History of Present Illness   Chief complaint:   Chief Complaint   Patient presents with    Left Knee - Pain, Swelling       HPI: Jaya Taylor is a 27 y o  male that c/o left knee pain  He states he experienced anterior knee pain and swelling about 3 weeks ago after kneeling on the ground, and twisted his left knee while his foot was planted about 1 week ago at work, aggravating this pain and swelling  Pain is located anterior on the knee and tightness is reported at the anterior lower leg, and he denies any calf pain  Pain is alleviated by ibuprofen, and he was also put on Bactrim 2x/day after visiting the urgent care 2 days ago as they were worried about an infection  Pain is aggravated by kneeling and ambulating stairs  He has not initiated any physical therapy or injections, and he denies any injuries or surgeries to this knee  ROS:    See HPI for musculoskeletal review  All other systems reviewed are negative     Historical Information   Past Medical History:   Diagnosis Date    Anxiety 07/22/2021    Back pain     See Chiro regularly    Class 2 severe obesity with serious comorbidity and body mass index (BMI) of 37 0 to 37 9 in [facility])     History of asthma     Symptomatic when smoking    Last used inhaler in       Hypertriglyceridemia     Other hemorrhoids     Other hyperlipidemia     Vitamin D deficiency      Past Surgical History:   Procedure Laterality Date    COLONOSCOPY  2016    +Hemorrhoids     Social History   Social History     Substance and Sexual Activity   Alcohol Use Yes    Alcohol/week: 1 0 standard drink    Types: 1 Cans of beer per week     Social History     Substance and Sexual Activity   Drug Use Not Currently    Types: Marijuana    Comment: Last use      Social History     Tobacco Use   Smoking Status Former Smoker    Packs/day: 0 50    Years: 22 00    Pack years: 11 00    Types: Cigarettes    Start date: 1999   Justin Daubs Quit date: 3/22/2021    Years since quittin 4   Smokeless Tobacco Former User    Types: Claudette Zheng Quit date: 10/1/2020     Family History:   Family History   Problem Relation Age of Onset    Hyperlipidemia Mother     Restless legs syndrome Mother     No Known Problems Father     ADD / ADHD Brother     No Known Problems Son        Current Outpatient Medications on File Prior to Visit   Medication Sig Dispense Refill    cholecalciferol (VITAMIN D3) 400 units tablet Take 400 Units by mouth daily      ergocalciferol (ERGOCALCIFEROL) 1 25 MG (00599 UT) capsule Take 1 capsule (50,000 Units total) by mouth once a week for 12 doses 12 capsule 0    ibuprofen (MOTRIN) 800 mg tablet Take 1 tablet (800 mg total) by mouth every 6 (six) hours as needed for mild pain (Take as needed with meals or snack) 30 tablet 0    Multiple Vitamin (MULTIVITAMIN) tablet Take 1 tablet by mouth daily       omega-3-acid ethyl esters (LOVAZA) 1 g capsule Take 2 capsules (2 g total) by mouth 2 (two) times a day (Patient not taking: Reported on 12/3/2021 ) 120 capsule 1    sulfamethoxazole-trimethoprim (BACTRIM DS) 800-160 mg per tablet Take 1 tablet by mouth every 12 (twelve) hours for 10 days 20 tablet 0     No current facility-administered medications on file prior to visit  Allergies   Allergen Reactions    Amoxicillin Swelling     Swollen taste buds, taste alteration, increased temperature sensitivity       Current Outpatient Medications on File Prior to Visit   Medication Sig Dispense Refill    cholecalciferol (VITAMIN D3) 400 units tablet Take 400 Units by mouth daily      ergocalciferol (ERGOCALCIFEROL) 1 25 MG (26806 UT) capsule Take 1 capsule (50,000 Units total) by mouth once a week for 12 doses 12 capsule 0    ibuprofen (MOTRIN) 800 mg tablet Take 1 tablet (800 mg total) by mouth every 6 (six) hours as needed for mild pain (Take as needed with meals or snack) 30 tablet 0    Multiple Vitamin (MULTIVITAMIN) tablet Take 1 tablet by mouth daily       omega-3-acid ethyl esters (LOVAZA) 1 g capsule Take 2 capsules (2 g total) by mouth 2 (two) times a day (Patient not taking: Reported on 12/3/2021 ) 120 capsule 1    sulfamethoxazole-trimethoprim (BACTRIM DS) 800-160 mg per tablet Take 1 tablet by mouth every 12 (twelve) hours for 10 days 20 tablet 0     No current facility-administered medications on file prior to visit  Objective   Vitals: Blood pressure 135/77, pulse 69, height 6' 4" (1 93 m), weight (!) 142 kg (313 lb 9 6 oz)  ,Body mass index is 38 17 kg/m²      PE:  AAOx 3  WDWN  Hearing intact, no drainage from eyes  Regular rate  no audible wheezing  no abdominal distension  LE compartments soft, skin intact    leftknee:    Appearance:  + anterior swelling   +anterior fluid collection  No ecchymosis  no obvious joint deformity   No effusion  Palpation/Tenderness:  +TTP over medial joint line  + TTP over lateral joint line   + TTP over patella  No TTP over patellar tendon  No TTP over pes anserine bursa  Active Range of Motion:  AROM: 0-120 without pain  PROM: 0-120  Special Tests:  Medial Nash's Test:  Negative  Lateral Nash's Test:  Negative  Apley's compression test:  Negative  Lachman's Test:  negative  Anterior Drawer Test: Negative  Patellar grind:  Negative  Valgus Stress Test:  negative  Varus Stress Test:  negative     No ipsilateral hip pain with ROM    leftLE:    Sensation grossly intact  AT/GS/EHL intact    RLE:  EHL/AT/GS/quads/hamstrings/iliopsoas 5/5, sensation grossly intact L4, L5, S1, palpable pedal pulse  LLE:  EHL/AT/GS/quads/hamstrings/iliopsoas 5/5, sensation grossly intact L4, L5, S1, palpable pedal pulse      Imaging Studies: I have personally reviewed pertinent films in PACS  XR leftknee:    No acute osseous abnormalities

## 2022-09-16 NOTE — ED PROVIDER NOTES
History  Chief Complaint   Patient presents with    Knee Swelling     Pt reports L knee swelling, original injury was 3 weeks ago, reports last week was getting better and this weekend felt a pop and started to swell again, was told to come to ER to get it drained and for abx     PATIENT PRESENTS TO THE EMERGENCY ROOM WITH A 2 DAY HISTORY OF REDNESS AND SWELLING OVER THE ANTERIOR ASPECT OF HIS LEFT KNEE  HE STATES THAT HE KNEELS ON HIS KNEES THROUGHOUT THE DAY  HE QUESTIONS INJURING HIS KNEE WHILE KNEELING ON IT AND SUSTAINING AN ABRASION/RUG BURN A FEW WEEKS AGO  HE HAS NOTICE REDNESS AND SWELLING THAT HAS BEEN GETTING PROGRESSIVELY WORSE  HE WAS SEEN AT AN URGENT CARE CENTER 2 DAYS AGO AND HAD AN X-RAY WHICH WAS NEGATIVE FOR ANY ACUTE BONY ABNORMALITY  THERE IS NO EFFUSION SUSPECTED  HE WAS STARTED ON BACTRIM WHICH SHE HAS TAKEN 3 DOSES WITH NO IMPROVEMENT  HE WAS SEEN AT THE ORTHOPEDIC DOCTOR'S OFFICE TODAY  THEY DIAGNOSED HIM WITH PREPATELLAR BURSITIS AND SUCH TO THE EMERGENCY ROOM AS HE HAS FAILED OUTPATIENT THERAPY  HE DENIES ANY FEVER CHILLS  HE DENIES ANY TOOTH ACHES  HE DENIES ANY SORE THROAT  HE HAS NOT BEEN RECENTLY ILL WITH NASAL CONGESTION OR POSTNASAL DRIP OR COUGHING  HE DENIES ANY URINARY SYMPTOMS OF FREQUENCY, DYSURIA, HEMATURIA  HE DENIES ANY DISCHARGE FROM HIS PENIS  HIS PAST MEDICAL HISTORY IS POSITIVE FOR ANXIETY, BACK PAIN, OBESITY, ASTHMA, HYPERTRIGLYCERIDEMIA, HEMORRHOIDS, HYPERLIPIDEMIA, VITAMIN-D DEFICIENCY  DIFFERENTIAL DIAGNOSIS INCLUDES BUT IS NOT LIMITED TO ACUTE CELLULITIS, SEPTIC BURSITIS, SEPTIC ARTHRITIS, DERMATITIS        History provided by:  Patient  Knee Pain  Location:  Knee  Time since incident:  2 days  Knee location:  L knee  Pain details:     Quality:  Aching    Radiates to:  Does not radiate    Severity:  Moderate    Onset quality:  Gradual    Duration:  2 days    Progression:  Waxing and waning  Chronicity:  New  Dislocation: no    Prior injury to area: No  Relieved by:  Nothing  Worsened by:  Nothing  Ineffective treatments:  None tried  Associated symptoms: decreased ROM, stiffness and swelling    Associated symptoms: no fatigue, no fever and no tingling    Risk factors: obesity    Risk factors: no concern for non-accidental trauma, no frequent fractures, no known bone disorder and no recent illness        Prior to Admission Medications   Prescriptions Last Dose Informant Patient Reported? Taking? Multiple Vitamin (MULTIVITAMIN) tablet Unknown at Unknown time Self Yes No   Sig: Take 1 tablet by mouth daily    cholecalciferol (VITAMIN D3) 400 units tablet Unknown at Unknown time  Yes No   Sig: Take 400 Units by mouth daily   ergocalciferol (ERGOCALCIFEROL) 1 25 MG (98144 UT) capsule  Self No No   Sig: Take 1 capsule (50,000 Units total) by mouth once a week for 12 doses   ibuprofen (MOTRIN) 800 mg tablet Unknown at Unknown time  No No   Sig: Take 1 tablet (800 mg total) by mouth every 6 (six) hours as needed for mild pain (Take as needed with meals or snack)   niacin 500 mg tablet 9/18/2022 at Unknown time  Yes Yes   Sig: Take 500 mg by mouth 2 (two) times a day with meals   omega-3-acid ethyl esters (LOVAZA) 1 g capsule 9/18/2022 at Unknown time  No Yes   Sig: Take 2 capsules (2 g total) by mouth 2 (two) times a day   sulfamethoxazole-trimethoprim (BACTRIM DS) 800-160 mg per tablet Past Week at Unknown time  No Yes   Sig: Take 1 tablet by mouth every 12 (twelve) hours for 10 days      Facility-Administered Medications: None       Past Medical History:   Diagnosis Date    Anxiety 07/22/2021    Back pain     See Chiro regularly    Class 2 severe obesity with serious comorbidity and body mass index (BMI) of 37 0 to 37 9 in adult [facility])     History of asthma     Symptomatic when smoking  Last used inhaler in 2011      Hypertriglyceridemia     Hypertriglyceridemia     Other hemorrhoids     Other hyperlipidemia     Vitamin D deficiency        Past Surgical History:   Procedure Laterality Date    COLONOSCOPY  2016    +Hemorrhoids    INCISION AND DRAINAGE OF WOUND Left 2022    Procedure: INCISION AND DRAINAGE (I&D) EXTREMITY prepatella Liane Market; Surgeon: Leslie Godwin DO;  Location: [facility];  Service: Orthopedics       Family History   Problem Relation Age of Onset    Hyperlipidemia Mother     Restless legs syndrome Mother     No Known Problems Father     ADD / ADHD Brother     No Known Problems Son      I have reviewed and agree with the history as documented  E-Cigarette/Vaping    E-Cigarette Use Former User     Start Date 11     Cartridges/Day 1     Quit Date 3/10/21      E-Cigarette/Vaping Substances    Nicotine Yes     THC No     CBD Yes     Flavoring Yes      Social History     Tobacco Use    Smoking status: Former Smoker     Packs/day: 0 50     Years: 22 00     Pack years: 11 00     Types: Cigarettes     Start date: 1999     Quit date: 3/22/2021     Years since quittin 4    Smokeless tobacco: Former User     Types: [address] date: 10/1/2020   Vaping Use    Vaping Use: Former    Start date: 2013   Amy Obrien Quit date: 3/10/2021    Substances: Nicotine, CBD, Flavoring   Substance Use Topics    Alcohol use: Yes     Alcohol/week: 1 0 standard drink     Types: 1 Cans of beer per week    Drug use: Not Currently     Types: Marijuana     Comment: Last use        Review of Systems   Constitutional: Positive for activity change  Negative for appetite change, chills, diaphoresis, fatigue and fever  HENT: Negative for congestion, dental problem, ear discharge, ear pain, mouth sores, postnasal drip, rhinorrhea and sore throat  Eyes: Negative for pain, discharge, redness and itching  Respiratory: Negative for chest tightness and shortness of breath  Cardiovascular: Negative for chest pain, palpitations and leg swelling  Genitourinary: Negative for dysuria, frequency, hematuria and urgency     Musculoskeletal: Positive for stiffness  Negative for arthralgias  Skin: Positive for color change  Psychiatric/Behavioral: Negative for confusion  All other systems reviewed and are negative  Physical Exam  Physical Exam  Vitals and nursing note reviewed  Constitutional:       General: He is not in acute distress  Appearance: Normal appearance  He is not ill-appearing, toxic-appearing or diaphoretic  HENT:      Head: Normocephalic and atraumatic  Right Ear: External ear normal       Left Ear: External ear normal    Eyes:      General:         Right eye: No discharge  Left eye: No discharge  Conjunctiva/sclera: Conjunctivae normal    Cardiovascular:      Rate and Rhythm: Normal rate and regular rhythm  Heart sounds: Normal heart sounds  Pulmonary:      Effort: Pulmonary effort is normal       Breath sounds: Normal breath sounds  Abdominal:      Tenderness: There is no abdominal tenderness  There is no guarding or rebound  Musculoskeletal:      Cervical back: Neck supple  Right lower leg: No edema  Left lower leg: No edema  Comments: INSPECTION OF THE LEFT KNEE-THERE IS ERYTHEMA PRESENT OVER THE PREPATELLAR AREA  THERE IS NO FLUCTUANCE  THERE IS SOME LOCALIZED EDEMA PRESENT  THERE IS NO JOINT EFFUSION SEEN  PATIENT HAS GOOD RANGE OF MOTION WITH LACK OF TERMINAL MOTION SECONDARY TO SWELLING  COLLATERALS ARE INTACT  THERE IS NEGATIVE LACHMAN'S SIGN  Skin:     Capillary Refill: Capillary refill takes less than 2 seconds  Findings: Erythema present  Comments: ABRASION WITH ERYTHEMA OVER THE ANTERIOR ASPECT OF THE LEFT KNEE  Neurological:      Mental Status: He is alert and oriented to person, place, and time  Psychiatric:         Mood and Affect: Mood normal          Behavior: Behavior normal          Thought Content:  Thought content normal          Judgment: Judgment normal          Vital Signs  ED Triage Vitals   Temperature Pulse Respirations Blood Pressure SpO2   09/16/22 1658 09/16/22 1658 09/16/22 1658 09/16/22 1658 09/16/22 1658   98 2 °F (36 8 °C) 84 18 119/73 99 %      Temp Source Heart Rate Source Patient Position - Orthostatic VS BP Location FiO2 (%)   09/16/22 1658 09/16/22 1658 09/16/22 1658 09/16/22 1658 --   Oral Monitor Sitting Left arm       Pain Score       09/16/22 2323       1           Vitals:    09/17/22 0945 09/17/22 1506 09/17/22 2234 09/18/22 0748   BP: 115/62 118/76 128/63 113/68   Pulse: 80 93 89 85   Patient Position - Orthostatic VS:             Visual Acuity      ED Medications  Medications   cefepime (MAXIPIME) IVPB (premix in dextrose) 2,000 mg 50 mL (0 mg Intravenous Stopped 9/16/22 2011)   vancomycin (VANCOCIN) 2,000 mg in sodium chloride 0 9 % 500 mL IVPB (2,000 mg Intravenous New Bag 9/16/22 2017)   clindamycin (CLEOCIN) IVPB (premix in dextrose) 900 mg 50 mL (900 mg Intravenous Given 9/17/22 0840)   lidocaine (PF) (XYLOCAINE-MPF) 1 % injection 5 mL (5 mL Infiltration Given 9/17/22 0801)       Diagnostic Studies  Results Reviewed     Procedure Component Value Units Date/Time    Blood culture #1 [[id number]] Collected: 09/16/22 1923    Lab Status: Preliminary result Specimen: Blood from Arm, Left Updated: 09/19/22 0003     Blood Culture No Growth at 48 hrs  Blood culture #2 [[id number]] Collected: 09/16/22 1923    Lab Status: Preliminary result Specimen: Blood from Arm, Right Updated: 09/19/22 0003     Blood Culture No Growth at 48 hrs      Basic metabolic panel [[id number]]  (Abnormal) Collected: 09/17/22 0513    Lab Status: Final result Specimen: Blood from Arm, Right Updated: 09/17/22 0547     Sodium 137 mmol/L      Potassium 3 9 mmol/L      Chloride 106 mmol/L      CO2 24 mmol/L      ANION GAP 7 mmol/L      BUN 12 mg/dL      Creatinine 0 85 mg/dL      Glucose 98 mg/dL      Glucose, Fasting 98 mg/dL      Calcium 8 3 mg/dL      eGFR 113 ml/min/1 73sq m     Narrative:      [place] guidelines for Chronic Kidney Disease (CKD):     Stage 1 with normal or high GFR (GFR > 90 mL/min/1 73 square meters)    Stage 2 Mild CKD (GFR = 60-89 mL/min/1 73 square meters)    Stage 3A Moderate CKD (GFR = 45-59 mL/min/1 73 square meters)    Stage 3B Moderate CKD (GFR = 30-44 mL/min/1 73 square meters)    Stage 4 Severe CKD (GFR = 15-29 mL/min/1 73 square meters)    Stage 5 End Stage CKD (GFR <15 mL/min/1 73 square meters)  Note: GFR calculation is accurate only with a steady state creatinine    CBC and differential [[id number]] Collected: 09/17/22 0513    Lab Status: Final result Specimen: Blood from Arm, Right Updated: 09/17/22 0525     WBC 6 31 Thousand/uL      RBC 4 11 Million/uL      Hemoglobin 12 3 g/dL      Hematocrit 37 2 %      MCV 91 fL      MCH 29 9 pg      MCHC 33 1 g/dL      RDW 12 0 %      MPV 9 4 fL      Platelets 840 Thousands/uL      nRBC 0 /100 WBCs      Neutrophils Relative 57 %      Immat GRANS % 0 %      Lymphocytes Relative 26 %      Monocytes Relative 10 %      Eosinophils Relative 6 %      Basophils Relative 1 %      Neutrophils Absolute 3 64 Thousands/µL      Immature Grans Absolute 0 01 Thousand/uL      Lymphocytes Absolute 1 64 Thousands/µL      Monocytes Absolute 0 60 Thousand/µL      Eosinophils Absolute 0 39 Thousand/µL      Basophils Absolute 0 03 Thousands/µL     Uric acid [[id number]]  (Normal) Collected: 09/16/22 1923    Lab Status: Final result Specimen: Blood from Arm, Left Updated: 09/16/22 2253     Uric Acid 6 9 mg/dL     Basic metabolic panel [[id number]] Collected: 09/16/22 1923    Lab Status: Final result Specimen: Blood from Arm, Left Updated: 09/16/22 2004     Sodium 140 mmol/L      Potassium 3 7 mmol/L      Chloride 104 mmol/L      CO2 28 mmol/L      ANION GAP 8 mmol/L      BUN 15 mg/dL      Creatinine 1 03 mg/dL      Glucose 96 mg/dL      Calcium 9 0 mg/dL      eGFR 94 ml/min/1 73sq m     Narrative:      [place] guidelines for Chronic Kidney Disease (CKD):     Stage 1 with normal or high GFR (GFR > 90 mL/min/1 73 square meters)    Stage 2 Mild CKD (GFR = 60-89 mL/min/1 73 square meters)    Stage 3A Moderate CKD (GFR = 45-59 mL/min/1 73 square meters)    Stage 3B Moderate CKD (GFR = 30-44 mL/min/1 73 square meters)    Stage 4 Severe CKD (GFR = 15-29 mL/min/1 73 square meters)    Stage 5 End Stage CKD (GFR <15 mL/min/1 73 square meters)  Note: GFR calculation is accurate only with a steady state creatinine    CBC and differential [[id number]] Collected: 09/16/22 1923    Lab Status: Final result Specimen: Blood from Arm, Left Updated: 09/16/22 1946     WBC 6 56 Thousand/uL      RBC 4 40 Million/uL      Hemoglobin 13 1 g/dL      Hematocrit 39 9 %      MCV 91 fL      MCH 29 8 pg      MCHC 32 8 g/dL      RDW 12 1 %      MPV 9 4 fL      Platelets 894 Thousands/uL      nRBC 0 /100 WBCs      Neutrophils Relative 53 %      Immat GRANS % 1 %      Lymphocytes Relative 31 %      Monocytes Relative 9 %      Eosinophils Relative 5 %      Basophils Relative 1 %      Neutrophils Absolute 3 60 Thousands/µL      Immature Grans Absolute 0 03 Thousand/uL      Lymphocytes Absolute 2 00 Thousands/µL      Monocytes Absolute 0 60 Thousand/µL      Eosinophils Absolute 0 30 Thousand/µL      Basophils Absolute 0 03 Thousands/µL                  VAS lower limb venous duplex study, unilateral/limited   Final Result by Josias Porras MD (09/17 1538)                 Procedures  Procedures         ED Course                                             MDM  Number of Diagnoses or Management Options  Cellulitis: new and requires workup  Prepatellar bursitis of left knee: new and requires workup     Amount and/or Complexity of Data Reviewed  Clinical lab tests: ordered and reviewed  Tests in the radiology section of CPT®: ordered and reviewed  Tests in the medicine section of CPT®: ordered and reviewed    Risk of Complications, Morbidity, and/or Mortality  Presenting problems: high  Diagnostic procedures: high  Management options: high    Patient Progress  Patient progress: stable      Disposition  Final diagnoses:   Prepatellar bursitis of left knee   Cellulitis - Left knee     Time reflects when diagnosis was documented in both MDM as applicable and the Disposition within this note     Time User Action Codes Description Comment    9/16/2022  9:32 PM Elma Dixons Add [A84 798] Cellulitis of right knee     9/16/2022  9:32 PM Jaye Dixons Add [M70 42] Prepatellar bursitis of left knee     9/16/2022  9:32 PM Elma Dixons Modify [M70 42] Prepatellar bursitis of left knee     9/16/2022  9:32 PM Jaye Dixons Remove [F18 050] Cellulitis of right knee     9/16/2022  9:32 PM Jaye Dixons Add [R19 81] Cellulitis     9/16/2022  9:33 PM Elma Dixons Modify [L03 90] Cellulitis Left knee    9/16/2022 10:28 PM Nithya Landau [U90 87] Cellulitis     9/16/2022 10:28 PM Leotha Shillings Add [M25 562] Acute pain of left knee     9/16/2022 10:28 PM Leotha Shillings Modify [L03 90] Cellulitis Left knee    9/17/2022  9:27 AM Carrolyn Sessions Modify [M70 42] Prepatellar bursitis of left knee     9/18/2022 11:46 AM Enid Reveal Modify [M70 42] Prepatellar bursitis of left knee       ED Disposition     ED Disposition   Admit    Condition   Stable    Date/Time   Fri Sep 16, 2022  9:32 PM    Comment   Case was discussed with DANA Kennedy and the patient's admission status was agreed to be Admission Status: observation status to the service of Dr Nanci Rueda              Follow-up Information     Follow up With Specialties Details Why [address], DO Orthopedic Surgery Follow up in 1 week(s)  [address] [address] [phone]            Discharge Medication List as of 9/18/2022 12:21 PM      CONTINUE these medications which have NOT CHANGED    Details   cholecalciferol (VITAMIN D3) 400 units tablet Take 400 Units by mouth daily, Historical Med ergocalciferol (ERGOCALCIFEROL) 1 25 MG (35575 UT) capsule Take 1 capsule (50,000 Units total) by mouth once a week for 12 doses, Starting Thu 11/18/2021, Until Fri 2/4/2022, Normal      ibuprofen (MOTRIN) 800 mg tablet Take 1 tablet (800 mg total) by mouth every 6 (six) hours as needed for mild pain (Take as needed with meals or snack), Starting Wed 9/14/2022, Normal      Multiple Vitamin (MULTIVITAMIN) tablet Take 1 tablet by mouth daily , Historical Med      niacin 500 mg tablet Take 500 mg by mouth 2 (two) times a day with meals, Historical Med      omega-3-acid ethyl esters (LOVAZA) 1 g capsule Take 2 capsules (2 g total) by mouth 2 (two) times a day, Starting Fri 10/1/2021, Normal      sulfamethoxazole-trimethoprim (BACTRIM DS) 800-160 mg per tablet Take 1 tablet by mouth every 12 (twelve) hours for 10 days, Starting Wed 9/14/2022, Until Sat 9/24/2022, Normal                 PDMP Review       Value Time User    PDMP Reviewed  Yes 7/22/2021  3:08 PM Preet Sparks DO ED Provider  Electronically Signed by           Brian Walker PA-C  09/19/22 2041

## 2022-09-17 ENCOUNTER — ANESTHESIA EVENT (OUTPATIENT)
Dept: PERIOP | Facility: HOSPITAL | Age: 32
End: 2022-09-17
Payer: COMMERCIAL

## 2022-09-17 ENCOUNTER — APPOINTMENT (OUTPATIENT)
Dept: VASCULAR ULTRASOUND | Facility: HOSPITAL | Age: 32
End: 2022-09-17
Payer: COMMERCIAL

## 2022-09-17 ENCOUNTER — ANESTHESIA (OUTPATIENT)
Dept: PERIOP | Facility: HOSPITAL | Age: 32
End: 2022-09-17
Payer: COMMERCIAL

## 2022-09-17 LAB
ANION GAP SERPL CALCULATED.3IONS-SCNC: 7 MMOL/L (ref 4–13)
APPEARANCE FLD: ABNORMAL
BASOPHILS # BLD AUTO: 0.03 THOUSANDS/ΜL (ref 0–0.1)
BASOPHILS NFR BLD AUTO: 1 % (ref 0–1)
BUN SERPL-MCNC: 12 MG/DL (ref 5–25)
CALCIUM SERPL-MCNC: 8.3 MG/DL (ref 8.4–10.2)
CHLORIDE SERPL-SCNC: 106 MMOL/L (ref 96–108)
CO2 SERPL-SCNC: 24 MMOL/L (ref 21–32)
COLOR FLD: YELLOW
CREAT SERPL-MCNC: 0.85 MG/DL (ref 0.6–1.3)
CRYSTALS SNV QL MICRO: NORMAL
EOSINOPHIL # BLD AUTO: 0.39 THOUSAND/ΜL (ref 0–0.61)
EOSINOPHIL NFR BLD AUTO: 6 % (ref 0–6)
ERYTHROCYTE [DISTWIDTH] IN BLOOD BY AUTOMATED COUNT: 12 % (ref 11.6–15.1)
GFR SERPL CREATININE-BSD FRML MDRD: 113 ML/MIN/1.73SQ M
GLUCOSE P FAST SERPL-MCNC: 98 MG/DL (ref 65–99)
GLUCOSE SERPL-MCNC: 98 MG/DL (ref 65–140)
HCT VFR BLD AUTO: 37.2 % (ref 36.5–49.3)
HGB BLD-MCNC: 12.3 G/DL (ref 12–17)
IMM GRANULOCYTES # BLD AUTO: 0.01 THOUSAND/UL (ref 0–0.2)
IMM GRANULOCYTES NFR BLD AUTO: 0 % (ref 0–2)
LYMPHOCYTES # BLD AUTO: 1.64 THOUSANDS/ΜL (ref 0.6–4.47)
LYMPHOCYTES # SNV MANUAL: 7 %
LYMPHOCYTES NFR BLD AUTO: 26 % (ref 14–44)
MCH RBC QN AUTO: 29.9 PG (ref 26.8–34.3)
MCHC RBC AUTO-ENTMCNC: 33.1 G/DL (ref 31.4–37.4)
MCV RBC AUTO: 91 FL (ref 82–98)
MONOCYTES # BLD AUTO: 0.6 THOUSAND/ΜL (ref 0.17–1.22)
MONOCYTES NFR BLD AUTO: 10 % (ref 4–12)
NEUTROPHILS # BLD AUTO: 3.64 THOUSANDS/ΜL (ref 1.85–7.62)
NEUTROPHILS NFR SNV MANUAL: 93 %
NEUTS SEG NFR BLD AUTO: 57 % (ref 43–75)
NRBC BLD AUTO-RTO: 0 /100 WBCS
PLATELET # BLD AUTO: 246 THOUSANDS/UL (ref 149–390)
PMV BLD AUTO: 9.4 FL (ref 8.9–12.7)
POTASSIUM SERPL-SCNC: 3.9 MMOL/L (ref 3.5–5.3)
RBC # BLD AUTO: 4.11 MILLION/UL (ref 3.88–5.62)
RBC # SNV MANUAL: ABNORMAL /UL (ref 0–10)
SITE: ABNORMAL
SODIUM SERPL-SCNC: 137 MMOL/L (ref 135–147)
TOTAL CELLS COUNTED SPEC: 100
WBC # BLD AUTO: 6.31 THOUSAND/UL (ref 4.31–10.16)
WBC # FLD MANUAL: ABNORMAL /UL (ref 0–200)

## 2022-09-17 PROCEDURE — 20610 DRAIN/INJ JOINT/BURSA W/O US: CPT | Performed by: PHYSICIAN ASSISTANT

## 2022-09-17 PROCEDURE — 87186 SC STD MICRODIL/AGAR DIL: CPT | Performed by: PHYSICIAN ASSISTANT

## 2022-09-17 PROCEDURE — 0M9P3ZX DRAINAGE OF LEFT KNEE BURSA AND LIGAMENT, PERCUTANEOUS APPROACH, DIAGNOSTIC: ICD-10-PCS | Performed by: ORTHOPAEDIC SURGERY

## 2022-09-17 PROCEDURE — 89060 EXAM SYNOVIAL FLUID CRYSTALS: CPT | Performed by: PHYSICIAN ASSISTANT

## 2022-09-17 PROCEDURE — 93971 EXTREMITY STUDY: CPT | Performed by: SURGERY

## 2022-09-17 PROCEDURE — 0MDP0ZZ EXTRACTION OF LEFT KNEE BURSA AND LIGAMENT, OPEN APPROACH: ICD-10-PCS | Performed by: ORTHOPAEDIC SURGERY

## 2022-09-17 PROCEDURE — 99233 SBSQ HOSP IP/OBS HIGH 50: CPT | Performed by: INTERNAL MEDICINE

## 2022-09-17 PROCEDURE — 87070 CULTURE OTHR SPECIMN AEROBIC: CPT | Performed by: ORTHOPAEDIC SURGERY

## 2022-09-17 PROCEDURE — 87205 SMEAR GRAM STAIN: CPT | Performed by: PHYSICIAN ASSISTANT

## 2022-09-17 PROCEDURE — 89050 BODY FLUID CELL COUNT: CPT | Performed by: PHYSICIAN ASSISTANT

## 2022-09-17 PROCEDURE — 87186 SC STD MICRODIL/AGAR DIL: CPT | Performed by: ORTHOPAEDIC SURGERY

## 2022-09-17 PROCEDURE — 87075 CULTR BACTERIA EXCEPT BLOOD: CPT | Performed by: ORTHOPAEDIC SURGERY

## 2022-09-17 PROCEDURE — 85025 COMPLETE CBC W/AUTO DIFF WBC: CPT | Performed by: PHYSICIAN ASSISTANT

## 2022-09-17 PROCEDURE — 89051 BODY FLUID CELL COUNT: CPT | Performed by: PHYSICIAN ASSISTANT

## 2022-09-17 PROCEDURE — 93971 EXTREMITY STUDY: CPT

## 2022-09-17 PROCEDURE — 87205 SMEAR GRAM STAIN: CPT | Performed by: ORTHOPAEDIC SURGERY

## 2022-09-17 PROCEDURE — 99254 IP/OBS CNSLTJ NEW/EST MOD 60: CPT | Performed by: ORTHOPAEDIC SURGERY

## 2022-09-17 PROCEDURE — 27301 DRAIN THIGH/KNEE LESION: CPT | Performed by: ORTHOPAEDIC SURGERY

## 2022-09-17 PROCEDURE — 80048 BASIC METABOLIC PNL TOTAL CA: CPT | Performed by: PHYSICIAN ASSISTANT

## 2022-09-17 PROCEDURE — 27301 DRAIN THIGH/KNEE LESION: CPT | Performed by: PHYSICIAN ASSISTANT

## 2022-09-17 PROCEDURE — 87070 CULTURE OTHR SPECIMN AEROBIC: CPT | Performed by: PHYSICIAN ASSISTANT

## 2022-09-17 RX ORDER — SODIUM CHLORIDE, SODIUM LACTATE, POTASSIUM CHLORIDE, CALCIUM CHLORIDE 600; 310; 30; 20 MG/100ML; MG/100ML; MG/100ML; MG/100ML
125 INJECTION, SOLUTION INTRAVENOUS CONTINUOUS
Status: DISCONTINUED | OUTPATIENT
Start: 2022-09-17 | End: 2022-09-17

## 2022-09-17 RX ORDER — DEXAMETHASONE SODIUM PHOSPHATE 10 MG/ML
INJECTION, SOLUTION INTRAMUSCULAR; INTRAVENOUS AS NEEDED
Status: DISCONTINUED | OUTPATIENT
Start: 2022-09-17 | End: 2022-09-17

## 2022-09-17 RX ORDER — LIDOCAINE HYDROCHLORIDE 10 MG/ML
0.5 INJECTION, SOLUTION EPIDURAL; INFILTRATION; INTRACAUDAL; PERINEURAL ONCE AS NEEDED
Status: DISCONTINUED | OUTPATIENT
Start: 2022-09-17 | End: 2022-09-17 | Stop reason: HOSPADM

## 2022-09-17 RX ORDER — FENTANYL CITRATE 50 UG/ML
INJECTION, SOLUTION INTRAMUSCULAR; INTRAVENOUS AS NEEDED
Status: DISCONTINUED | OUTPATIENT
Start: 2022-09-17 | End: 2022-09-17

## 2022-09-17 RX ORDER — ONDANSETRON 2 MG/ML
INJECTION INTRAMUSCULAR; INTRAVENOUS AS NEEDED
Status: DISCONTINUED | OUTPATIENT
Start: 2022-09-17 | End: 2022-09-17

## 2022-09-17 RX ORDER — LIDOCAINE HYDROCHLORIDE 10 MG/ML
5 INJECTION, SOLUTION EPIDURAL; INFILTRATION; INTRACAUDAL; PERINEURAL ONCE
Status: COMPLETED | OUTPATIENT
Start: 2022-09-17 | End: 2022-09-17

## 2022-09-17 RX ORDER — FENTANYL CITRATE/PF 50 MCG/ML
50 SYRINGE (ML) INJECTION
Status: DISCONTINUED | OUTPATIENT
Start: 2022-09-17 | End: 2022-09-17 | Stop reason: HOSPADM

## 2022-09-17 RX ORDER — MAGNESIUM HYDROXIDE 1200 MG/15ML
LIQUID ORAL AS NEEDED
Status: DISCONTINUED | OUTPATIENT
Start: 2022-09-17 | End: 2022-09-17 | Stop reason: HOSPADM

## 2022-09-17 RX ORDER — DEXMEDETOMIDINE HYDROCHLORIDE 100 UG/ML
INJECTION, SOLUTION INTRAVENOUS AS NEEDED
Status: DISCONTINUED | OUTPATIENT
Start: 2022-09-17 | End: 2022-09-17

## 2022-09-17 RX ORDER — LIDOCAINE HYDROCHLORIDE 10 MG/ML
INJECTION, SOLUTION EPIDURAL; INFILTRATION; INTRACAUDAL; PERINEURAL AS NEEDED
Status: DISCONTINUED | OUTPATIENT
Start: 2022-09-17 | End: 2022-09-17

## 2022-09-17 RX ORDER — MIDAZOLAM HYDROCHLORIDE 2 MG/2ML
INJECTION, SOLUTION INTRAMUSCULAR; INTRAVENOUS AS NEEDED
Status: DISCONTINUED | OUTPATIENT
Start: 2022-09-17 | End: 2022-09-17

## 2022-09-17 RX ORDER — HYDROMORPHONE HCL/PF 1 MG/ML
0.5 SYRINGE (ML) INJECTION
Status: DISCONTINUED | OUTPATIENT
Start: 2022-09-17 | End: 2022-09-17 | Stop reason: HOSPADM

## 2022-09-17 RX ORDER — SODIUM CHLORIDE 450 MG/100ML
75 INJECTION, SOLUTION INTRAVENOUS CONTINUOUS
Status: DISCONTINUED | OUTPATIENT
Start: 2022-09-17 | End: 2022-09-18 | Stop reason: HOSPADM

## 2022-09-17 RX ORDER — CLINDAMYCIN PHOSPHATE 900 MG/50ML
900 INJECTION INTRAVENOUS ONCE
Status: COMPLETED | OUTPATIENT
Start: 2022-09-17 | End: 2022-09-17

## 2022-09-17 RX ORDER — PROPOFOL 10 MG/ML
INJECTION, EMULSION INTRAVENOUS AS NEEDED
Status: DISCONTINUED | OUTPATIENT
Start: 2022-09-17 | End: 2022-09-17

## 2022-09-17 RX ORDER — ONDANSETRON 2 MG/ML
4 INJECTION INTRAMUSCULAR; INTRAVENOUS ONCE AS NEEDED
Status: DISCONTINUED | OUTPATIENT
Start: 2022-09-17 | End: 2022-09-17 | Stop reason: HOSPADM

## 2022-09-17 RX ORDER — KETOROLAC TROMETHAMINE 30 MG/ML
30 INJECTION, SOLUTION INTRAMUSCULAR; INTRAVENOUS EVERY 6 HOURS PRN
Status: DISCONTINUED | OUTPATIENT
Start: 2022-09-17 | End: 2022-09-18 | Stop reason: HOSPADM

## 2022-09-17 RX ADMIN — SODIUM CHLORIDE, SODIUM LACTATE, POTASSIUM CHLORIDE, AND CALCIUM CHLORIDE 125 ML/HR: .6; .31; .03; .02 INJECTION, SOLUTION INTRAVENOUS at 11:39

## 2022-09-17 RX ADMIN — CEFTRIAXONE 2000 MG: 2 INJECTION, SOLUTION INTRAVENOUS at 04:56

## 2022-09-17 RX ADMIN — FENTANYL CITRATE 50 MCG: 50 INJECTION, SOLUTION INTRAMUSCULAR; INTRAVENOUS at 08:51

## 2022-09-17 RX ADMIN — SODIUM CHLORIDE 75 ML/HR: 0.45 INJECTION, SOLUTION INTRAVENOUS at 15:42

## 2022-09-17 RX ADMIN — PROPOFOL 300 MG: 10 INJECTION, EMULSION INTRAVENOUS at 08:47

## 2022-09-17 RX ADMIN — DEXMEDETOMIDINE HYDROCHLORIDE 8 MCG: 100 INJECTION, SOLUTION INTRAVENOUS at 08:52

## 2022-09-17 RX ADMIN — OMEGA-3 FATTY ACIDS CAP 1000 MG 2000 MG: 1000 CAP at 10:44

## 2022-09-17 RX ADMIN — ONDANSETRON 4 MG: 2 INJECTION INTRAMUSCULAR; INTRAVENOUS at 09:14

## 2022-09-17 RX ADMIN — MIDAZOLAM HYDROCHLORIDE 2 MG: 1 INJECTION, SOLUTION INTRAMUSCULAR; INTRAVENOUS at 08:40

## 2022-09-17 RX ADMIN — LIDOCAINE HYDROCHLORIDE 50 MG: 10 INJECTION, SOLUTION EPIDURAL; INFILTRATION; INTRACAUDAL; PERINEURAL at 08:47

## 2022-09-17 RX ADMIN — Medication 500 MG: at 11:32

## 2022-09-17 RX ADMIN — OMEGA-3 FATTY ACIDS CAP 1000 MG 2000 MG: 1000 CAP at 17:59

## 2022-09-17 RX ADMIN — DEXMEDETOMIDINE HYDROCHLORIDE 12 MCG: 100 INJECTION, SOLUTION INTRAVENOUS at 08:47

## 2022-09-17 RX ADMIN — ENOXAPARIN SODIUM 40 MG: 40 INJECTION SUBCUTANEOUS at 10:44

## 2022-09-17 RX ADMIN — DEXAMETHASONE SODIUM PHOSPHATE 10 MG: 10 INJECTION, SOLUTION INTRAMUSCULAR; INTRAVENOUS at 08:47

## 2022-09-17 RX ADMIN — SODIUM CHLORIDE, SODIUM LACTATE, POTASSIUM CHLORIDE, AND CALCIUM CHLORIDE: .6; .31; .03; .02 INJECTION, SOLUTION INTRAVENOUS at 08:36

## 2022-09-17 RX ADMIN — LIDOCAINE HYDROCHLORIDE 5 ML: 10 INJECTION, SOLUTION EPIDURAL; INFILTRATION; INTRACAUDAL; PERINEURAL at 08:01

## 2022-09-17 RX ADMIN — CLINDAMYCIN PHOSPHATE 900 MG: 900 INJECTION, SOLUTION INTRAVENOUS at 08:40

## 2022-09-17 NOTE — UTILIZATION REVIEW
Inpatient Admission Authorization Request   NOTIFICATION OF INPATIENT ADMISSION/INPATIENT AUTHORIZATION REQUEST   SERVICING FACILITY:   [address]  Tax ID: [id number]  NPI: [id number]  Place of Service: Inpatient [address]  Place of Service Code: 24     ATTENDING PROVIDER:  Attending Name and NPI#: Jorge Garza [place], [place] [[id number]]  Address: [address]  Phone: [phone]     UTILIZATION REVIEW CONTACT:  Shae Baker, Utilization   Network Utilization Review Department  Phone: [phone]  Fax: [phone]  Email: [email or web address]     PHYSICIAN ADVISORY SERVICES:  FOR [id number] REVIEW - MEDICAL NECESSITY DENIAL  Phone: [phone]  Fax: [phone]  Email: [email or web address]     TYPE OF REQUEST:  Inpatient Status     ADMISSION INFORMATION:  ADMISSION DATE/TIME: 9/17/22  9:03 AM  PATIENT DIAGNOSIS CODE/DESCRIPTION:  Cellulitis [L03 90]  Knee swelling [M25 469]  Prepatellar bursitis of left knee [M70 42]  Acute pain of left knee [M25 562]  DISCHARGE DATE/TIME: No discharge date for patient encounter  IMPORTANT INFORMATION:  Please contact Shae Baker directly with any questions or concerns regarding this request  Department voicemails are confidential     Send requests for admission clinical reviews, concurrent reviews, approvals, and administrative denials due to lack of clinical to fax [phone]

## 2022-09-17 NOTE — ANESTHESIA POSTPROCEDURE EVALUATION
Post-Op Assessment Note    CV Status:  Stable  Pain Score: 0    Pain management: adequate     Mental Status:  Sleepy   Hydration Status:  Euvolemic   PONV Controlled:  Controlled   Airway Patency:  Patent      Post Op Vitals Reviewed: Yes      Staff: CRNA         No complications documented      /61 (09/17/22 0925)    Temp 97 7 °F (36 5 °C) (09/17/22 0925)    Pulse 80 (09/17/22 0925)   Resp 18 (09/17/22 0925)    SpO2   93

## 2022-09-17 NOTE — PROGRESS NOTES
[facility]  Progress Note - Alec Mess 1990, 27 y o  male MRN: [id number]  Unit/Bed#: S -01 Encounter: [id number]  Primary Care Provider: Cesar Bautista,    Date and time admitted to hospital: 9/16/2022  6:44 PM    * Acute pain of left knee  Assessment & Plan  Pre-Patelar Bursitis: s/p I&D by Orthopedic in OR today,   F/u Culture data  Cont  Pt on iv ceftriaxone  F/u Ortho recs  Synovial fluid studies did show neutrophil predominant  No crystals  Venous duplex LLE to r/o DVT      Cellulitis of left lower extremity  Assessment & Plan  2/2 Pre-pateral bursitis  Rx with iv abx  WBAT  Hypertriglyceridemia  Assessment & Plan  · Continue niacin and omega 3    Class 2 severe obesity with serious comorbidity and body mass index (BMI) of 36 0 to 36 9 in [facility])  Assessment & Plan  Counseled on diet and exercise  Continue supportive care        Pharmacologic VTE Prophylaxis: No 2/2 recent Sx/   Mechanical VTE Prophylaxis in Place: No 2/2 Cellulitis  Patient Centered Rounds: I have performed bedside rounds with the Nursing staff today  Current Length of Stay: 0 day(s)  Current Patient Status: Inpatient   Code Status: Level 1 - Full Code  Time Spent for Care:  35 minutes  More than 50% of total time spent on counseling and coordination of care as described above  Discussions with Specialists or Other Care Team Provider: Yes  Education and Discussions with Family / Patient: no, pt refused  Discharge Plan: 93-30 hrs  Certification Statement: The patient will continue to require additional inpatient hospital stay due to Pre-pateral Bursitis and infected, CEllulitis  Subjective:   I have seen and Examined the patient at the bedside  No CP or Sob  Overnight events reviewed with the RN  No Other complains  Patient mentioned that his pain is slightly better after her is I&D however currently has some anaesthesia left over and hence cannot feel it  Otherwise patient denies any fevers or chills currently  No nausea vomiting  Review of System:   Denies any CP or SOB  Denies any Cough or Cold  Denies any Fevers or chills  Denies any focal tingling numbness or weakness in any extremities  Denies any abdominal pain, Nausea or vomiting  Objective:   Temp (24hrs), Av 9 °F (36 6 °C), Min:97 7 °F (36 5 °C), Max:98 2 °F (36 8 °C)    Temp:  [97 7 °F (36 5 °C)-98 2 °F (36 8 °C)] 97 7 °F (36 5 °C)  HR:  [80-84] 80  Resp:  [16-20] 16  BP: (112-142)/(57-82) 115/62  SpO2:  [93 %-100 %] 98 %  There is no height or weight on file to calculate BMI  Input and Output Summary (last 24 hours): Intake/Output Summary (Last 24 hours) at 2022 1338  Last data filed at 2022 0914  Gross per 24 hour   Intake 500 ml   Output 520 ml   Net -20 ml     I/O       09/15 0701   0700  0701   0700  0701   0700    I  V    500    Total Intake   500    Urine  520     Total Output  520     Net  -520 +500                 Physical Exam:   General : Alert, Awake and oriented x 2-3, NAD  Neck : Supple  Eyes:  MYLENE, EOMI  CVS : S1, S2, RRR    R/S : Clear to auscultate anteriorly  Abd: Soft, NT, ND  Bs+ve  Extremity and musculoskeletal exam:  Left knee covered with Ace wrap in dressing  Left leg has some erythema and redness  Left leg swollen as well  Skin:  Left lower extremity erythematous and swollen  CNS: No acute FND       Additional Data:     Labs, Culture & Imaging Data Reviewed:    Results from last 7 days   Lab Units 22  0513   WBC Thousand/uL 6 31   HEMOGLOBIN g/dL 12 3   HEMATOCRIT % 37 2   PLATELETS Thousands/uL 246     Results from last 7 days   Lab Units 22  0513   POTASSIUM mmol/L 3 9   CHLORIDE mmol/L 106   CO2 mmol/L 24   BUN mg/dL 12   CREATININE mg/dL 0 85   CALCIUM mg/dL 8 3*         Lab Results   Component Value Date    HGBA1C 5 5 2021      VAS lower limb venous duplex study, unilateral/limited (Results Pending)       Cultures:   Blood Culture:   Lab Results   Component Value Date    BLOODCX Received in Microbiology Lab  Culture in Progress  09/16/2022    BLOODCX Received in Microbiology Lab  Culture in Progress  09/16/2022     Urine Culture: No results found for: URINECX  Sputum Culture: No components found for: SPUTUMCX  Wound Culture: No results found for: WOUNDCULT    Last 24 Hours Medication List:   Current Facility-Administered Medications   Medication Dose Route Frequency Provider Last Rate    acetaminophen  650 mg Oral Q6H PRN Gypsy Mios, PA-C      cefTRIAXone  2,000 mg Intravenous Q24H Gypsy Mios, PA-C 2,000 mg (09/17/22 4006)    enoxaparin  40 mg Subcutaneous Daily Gypsy Cole, PA-C      fish oil  2,000 mg Oral BID Gypsy Feathers, PA-C      lactated ringers  125 mL/hr Intravenous Continuous Ana Trejo  mL/hr (09/17/22 1139)    niacin  500 mg Oral BID With Meals Gypsyness Cole, PA-C         Patient is at moderate risk for morbidity and mortality due to above mentioned illness and comorbidities

## 2022-09-17 NOTE — APP STUDENT NOTE
TEJINDER STUDENT  Inpatient Progress Note for TRAINING ONLY  Not Part of Legal Medical Record       H&P Exam - Merlinda Rock 27 y o  male MRN: [id number]    Unit/Bed#: ED-08 Encounter: [id number]    Assessment/Plan:  1  Prepatellar bursitis of left knee  · Patient reports kneeling on a small stone 3 weeks ago and developed swelling and pain to the left knee, which improved until 4 days ago when he twisted and heard a "crack"  Since then developed redness and swelling of left knee and was prescribed Bactrim at urgent care which he took 3 doses of  He was seen by orthopedics today who advised ED evaluation  · XR of left knee from 9/14/22 showed no acute abnormality  · WBC count is WNL at this time, patient does not meet SIRS criteria  He is afebrile  · Will order uric acid to rule out gout attack and doppler study to rule out DVT  · Orthopedics consulted   · IV Rocephin 2 g IVPB ordered     2  Hyperlipidemia  · Patient takes fish oil and omega 3, will continue  Advise lifestyle modifications    History of Present Illness   Merlinda Rock is a 27year old male with a past medical history of hyperlipidemia who presented to the ED for left knee swelling and pain  He states that 2 weeks ago he kneeled on a small rock and the developed swelling and redness to the anterior knee  Patient reports that those symptoms significantly improved until 4 days ago when he turned and felt a "crack" under his patella  The next day the patient noticed swelling and erythema to his left knee and leg with some associated pain and "tightness" to the leg with movement  He was seen at urgent care, had an x-ray done, and was placed on Bactrim  The patient took 3 doses of Bactrim and was seen by orthopedics who were concerned for left septic prepatellar bursitis and recommended ED evaluation  He notes that the erythema has improved since yesterday after he placed ice on the knee  The patient has full range of motion   He denies any previous injury to the area  ROS: Musculoskeletal ROS: negative for - joint stiffness, muscular weakness, pain in ankle, foot, back  Denies fever, chills, chest pain, shortness of breath, abdominal pain, nausea, vomiting, diarrhea  Historical Information   Past Medical History:   Diagnosis Date    Anxiety 2021    Back pain     See Chiro regularly    Class 2 severe obesity with serious comorbidity and body mass index (BMI) of 37 0 to 37 9 in adult [facility])     History of asthma     Symptomatic when smoking  Last used inhaler in       Hypertriglyceridemia     Hypertriglyceridemia     Other hemorrhoids     Other hyperlipidemia     Vitamin D deficiency      Past Surgical History:   Procedure Laterality Date    COLONOSCOPY  2016    +Hemorrhoids     Social History   Social History     Substance and Sexual Activity   Alcohol Use Yes    Alcohol/week: 1 0 standard drink    Types: 1 Cans of beer per week     Social History     Substance and Sexual Activity   Drug Use Not Currently    Types: Marijuana    Comment: Last use      Social History     Tobacco Use   Smoking Status Former Smoker    Packs/day: 0 50    Years: 22 00    Pack years: 11 00    Types: Cigarettes    Start date: 1999   Szymanski Quit date: 3/22/2021    Years since quittin 4   Smokeless Tobacco Former User    Types: Mook Bass Quit date: 10/1/2020     Family History: non-contributory    Meds/Allergies   all medications and allergies reviewed  Allergies   Allergen Reactions    Amoxicillin Swelling     Swollen taste buds, taste alteration, increased temperature sensitivity       Objective   First Vitals:   Blood Pressure: 119/73 (22)  Pulse: 84 (22)  Temperature: 98 2 °F (36 8 °C) (22)  Temp Source: Oral (22)  Respirations: 18 (22)  SpO2: 99 % (22)    Current Vitals:   Blood Pressure: 119/73 (22)  Pulse: 84 (22)  Temperature: 98 2 °F (36 8 °C) (22 1658)  Temp Source: Oral (09/16/22 1658)  Respirations: 18 (09/16/22 1658)  SpO2: 99 % (09/16/22 1658)    No intake or output data in the 24 hours ending 09/16/22 2235    Invasive Devices  Report    Peripheral Intravenous Line  Duration           Peripheral IV 09/16/22 Left Antecubital <1 day                Physical Exam: /73 (BP Location: Left arm)   Pulse 84   Temp 98 2 °F (36 8 °C) (Oral)   Resp 18   SpO2 99%     General Appearance:    Alert, cooperative, no distress, appears stated age   Head:    Normocephalic, without obvious abnormality, atraumatic   Lungs:     Clear to auscultation bilaterally, respirations unlabored   Chest wall:    No tenderness or deformity   Heart:    Regular rate and rhythm, S1 and S2 normal, no murmur, rub   or gallop   Abdomen:     Soft, non-tender, bowel sounds active all four quadrants,     no masses, no organomegaly   Extremities:   (+) mild erythema of left anterior knee and shin, (+) mild swelling of anterior knee  Extremities otherwise normal, atraumatic, no cyanosis or edema  Full range of motion of all extremities  Pulses:   2+ and symmetric all extremities   Skin:   Skin color, texture, turgor normal, no rashes or lesions      Lab Results:   Results Reviewed     Procedure Component Value Units Date/Time    Uric acid [[id number]] Collected: 09/16/22 1923    Lab Status:  In process Specimen: Blood from Arm, Left Updated: 09/16/22 5524    Basic metabolic panel [[id number]] Collected: 09/16/22 1923    Lab Status: Final result Specimen: Blood from Arm, Left Updated: 09/16/22 2004     Sodium 140 mmol/L      Potassium 3 7 mmol/L      Chloride 104 mmol/L      CO2 28 mmol/L      ANION GAP 8 mmol/L      BUN 15 mg/dL      Creatinine 1 03 mg/dL      Glucose 96 mg/dL      Calcium 9 0 mg/dL      eGFR 94 ml/min/1 73sq m     Narrative:      [place] guidelines for Chronic Kidney Disease (CKD):     Stage 1 with normal or high GFR (GFR > 90 mL/min/1 73 square meters)    Stage 2 Mild CKD (GFR = 60-89 mL/min/1 73 square meters)    Stage 3A Moderate CKD (GFR = 45-59 mL/min/1 73 square meters)    Stage 3B Moderate CKD (GFR = 30-44 mL/min/1 73 square meters)    Stage 4 Severe CKD (GFR = 15-29 mL/min/1 73 square meters)    Stage 5 End Stage CKD (GFR <15 mL/min/1 73 square meters)  Note: GFR calculation is accurate only with a steady state creatinine    CBC and differential [[id number]] Collected: 09/16/22 1923    Lab Status: Final result Specimen: Blood from Arm, Left Updated: 09/16/22 1946     WBC 6 56 Thousand/uL      RBC 4 40 Million/uL      Hemoglobin 13 1 g/dL      Hematocrit 39 9 %      MCV 91 fL      MCH 29 8 pg      MCHC 32 8 g/dL      RDW 12 1 %      MPV 9 4 fL      Platelets 558 Thousands/uL      nRBC 0 /100 WBCs      Neutrophils Relative 53 %      Immat GRANS % 1 %      Lymphocytes Relative 31 %      Monocytes Relative 9 %      Eosinophils Relative 5 %      Basophils Relative 1 %      Neutrophils Absolute 3 60 Thousands/µL      Immature Grans Absolute 0 03 Thousand/uL      Lymphocytes Absolute 2 00 Thousands/µL      Monocytes Absolute 0 60 Thousand/µL      Eosinophils Absolute 0 30 Thousand/µL      Basophils Absolute 0 03 Thousands/µL     Blood culture #2 [[id number]] Collected: 09/16/22 1923    Lab Status: In process Specimen: Blood from Arm, Right Updated: 09/16/22 1943    Blood culture #1 [[id number]] Collected: 09/16/22 1923    Lab Status: In process Specimen: Blood from Arm, Left Updated: 09/16/22 1943    UA (URINE) with reflex to Scope [[id number]]     Lab Status: No result Specimen: Urine         Imaging:   VAS lower limb venous duplex study, unilateral/limited    (Results Pending)     Code Status: Level 1 - Full Code  Advance Directive and Living Will:      Power of :    POLST:      Counseling / Coordination of Care: Total floor / unit time spent today 20 minutes       VAMSI SubramanianS

## 2022-09-17 NOTE — H&P
[facility] 1990, 27 y o  male MRN: [id number]  Unit/Bed#: ED-08 Encounter: [id number]  Primary Care Provider: Mart Runner, DO   Date and time admitted to hospital: 9/16/2022  6:44 PM    * Acute pain of left knee  Assessment & Plan  Patient presented to urgent care 09/14 2/2 with left knee pain/swelling x 2 weeks that started after he accidentally kneel down on a small rock and then with exacerbated pain after a sudden step and twisted motion a week later  He was initiated on Bactrim and saw Orthopedic Sx 2 days later who recommended for patient to go to the ER due to persistent sx and concern for L septic prepatellar bursitis  Patient has mild erythema of the left knee and anterior shin to ankle, boggy prominence on anterior patella, and swelling of LLE  · 09/14/22 left knee xray without acute findings    · Patient fortunately does not meet sepsis criteria    · Blood cx obtained   · IV Ceftriaxone   · Check uric acid   · Venous duplex LLE to r/o DVT  · Ortho consult for possible tap with fluid studies     Cellulitis of left lower extremity  Assessment & Plan  Patient presents with swollen left knee with overlying mild erythema that extends to the ankle and swelling of the LLE  · Management as stated above    Hypertriglyceridemia  Assessment & Plan  · Continue niacin and omega 3    VTE Pharmacologic Prophylaxis: VTE Score: 2 Moderate Risk (Score 3-4) - Pharmacological DVT Prophylaxis Ordered: enoxaparin (Lovenox)  Code Status: Level 1 - Full Code   Discussion with family: Patient declined call to   Anticipated Length of Stay: Patient will be admitted on an observation basis with an anticipated length of stay of less than 2 midnights secondary to left knee pain and cellulitis       Total Time for Visit, including Counseling / Coordination of Care: 45 minutes Greater than 50% of this total time spent on direct patient counseling and coordination of care  Chief Complaint: left knee pain and swelling     History of Present Illness:  Merlinda Rock is a 27 y o  male with a PMH of hypertriglyceridemia who presents with worsening left knee pain, redness, and swelling which is now spreading down his left leg to his ankle  Patient reports that his symptoms started about 2 weeks ago when he accidentally kneel down on a small rock with exacerbation of pain 1 week afterwards due to a sudden step and twisting motion  He was seen by urgent care on 09/14 and had a left knee x-ray without acute findings, he was initiated on Bactrim due to concern for possible infection  Patient was seen by Orthopedic surgery today who recommended for patient to go to the ER due to persistent symptoms and concern for left septic prepatellar bursitis  Patient has completed 2 days of Bactrim so far  He denies any fever/chills at home  Able to ambulate without difficulty  Review of Systems:  Review of Systems   Constitutional: Negative for appetite change, chills and fever  HENT: Negative for sore throat  Eyes: Negative for visual disturbance  Respiratory: Negative for shortness of breath  Cardiovascular: Negative for chest pain  Gastrointestinal: Negative for abdominal pain, nausea and vomiting  Genitourinary: Negative for dysuria  Musculoskeletal: Positive for joint swelling  Skin: Positive for color change  Neurological: Negative for dizziness and weakness  Past Medical and Surgical History:   Past Medical History:   Diagnosis Date    Anxiety 07/22/2021    Back pain     See Chiro regularly    Class 2 severe obesity with serious comorbidity and body mass index (BMI) of 37 0 to 37 9 in adult [facility])     History of asthma     Symptomatic when smoking  Last used inhaler in 2011      Hypertriglyceridemia     Hypertriglyceridemia     Other hemorrhoids     Other hyperlipidemia     Vitamin D deficiency        Past Surgical History: Procedure Laterality Date    COLONOSCOPY  2016    +Hemorrhoids       Meds/Allergies:  Prior to Admission medications    Medication Sig Start Date End Date Taking? Authorizing Provider   niacin 500 mg tablet Take 500 mg by mouth 2 (two) times a day with meals   Yes Historical Provider, MD   cholecalciferol (VITAMIN D3) 400 units tablet Take 400 Units by mouth daily    Historical Provider, MD   ergocalciferol (ERGOCALCIFEROL) 1 25 MG (23071 UT) capsule Take 1 capsule (50,000 Units total) by mouth once a week for 12 doses 11/18/21 2/4/22  Amber Razo DO   ibuprofen (MOTRIN) 800 mg tablet Take 1 tablet (800 mg total) by mouth every 6 (six) hours as needed for mild pain (Take as needed with meals or snack) 9/14/22   Mathew Moore PA-C   Multiple Vitamin (MULTIVITAMIN) tablet Take 1 tablet by mouth daily     Historical Provider, MD   omega-3-acid ethyl esters (LOVAZA) 1 g capsule Take 2 capsules (2 g total) by mouth 2 (two) times a day  Patient not taking: No sig reported 10/1/21   Amber Razo DO   sulfamethoxazole-trimethoprim (BACTRIM DS) 800-160 mg per tablet Take 1 tablet by mouth every 12 (twelve) hours for 10 days 9/14/22 9/24/22  Mathew Moore PA-C     I have reviewed home medications with patient personally  Allergies:    Allergies   Allergen Reactions    Amoxicillin Swelling     Swollen taste buds, taste alteration, increased temperature sensitivity       Social History:  Marital Status: Single   Patient Pre-hospital Living Situation: Home  Patient Pre-hospital Level of Mobility: walks  Patient Pre-hospital Diet Restrictions: none   Substance Use History:   Social History     Substance and Sexual Activity   Alcohol Use Yes    Alcohol/week: 1 0 standard drink    Types: 1 Cans of beer per week     Social History     Tobacco Use   Smoking Status Former Smoker    Packs/day: 0 50    Years: 22 00    Pack years: 11 00    Types: Cigarettes    Start date: 7/22/1999   Violet Peñaloza Quit date: 3/22/2021   Violet Peñaloza Years since quittin 4   Smokeless Tobacco Former User    Types: Catrachito Flores Quit date: 10/1/2020     Social History     Substance and Sexual Activity   Drug Use Not Currently    Types: Marijuana    Comment: Last use        Family History:  Family History   Problem Relation Age of Onset    Hyperlipidemia Mother     Restless legs syndrome Mother     No Known Problems Father     ADD / ADHD Brother     No Known Problems Son        Physical Exam:     Vitals:   Blood Pressure: 119/73 (22 1658)  Pulse: 84 (22)  Temperature: 98 2 °F (36 8 °C) (22)  Temp Source: Oral (22)  Respirations: 18 (22)  SpO2: 99 % (22)    Physical Exam  Constitutional:       General: He is not in acute distress  Appearance: He is obese  HENT:      Mouth/Throat:      Mouth: Mucous membranes are moist    Eyes:      General: No scleral icterus  Cardiovascular:      Rate and Rhythm: Normal rate and regular rhythm  Heart sounds: Normal heart sounds  Pulmonary:      Breath sounds: Normal breath sounds  Abdominal:      General: Abdomen is flat  Bowel sounds are normal       Palpations: Abdomen is soft  Tenderness: There is no abdominal tenderness  Musculoskeletal:         General: Swelling and tenderness present  Right lower leg: No edema  Left lower leg: Edema present  Comments: Left anterior knee with boggy prominence and TTP, mild overlying erythema that extends from the left knee to ankle  Mild swelling of LLE  Skin:     General: Skin is warm  Findings: Erythema present  Neurological:      Mental Status: He is alert and oriented to person, place, and time     Psychiatric:         Mood and Affect: Mood normal           Additional Data:     Lab Results:  Results from last 7 days   Lab Units 223   WBC Thousand/uL 6 56   HEMOGLOBIN g/dL 13 1   HEMATOCRIT % 39 9   PLATELETS Thousands/uL 281   NEUTROS PCT % 53   LYMPHS PCT % 31 MONOS PCT % 9   EOS PCT % 5     Results from last 7 days   Lab Units 09/16/22  1923   SODIUM mmol/L 140   POTASSIUM mmol/L 3 7   CHLORIDE mmol/L 104   CO2 mmol/L 28   BUN mg/dL 15   CREATININE mg/dL 1 03   ANION GAP mmol/L 8   CALCIUM mg/dL 9 0   GLUCOSE RANDOM mg/dL 96                       Imaging: No pertinent imaging reviewed  VAS lower limb venous duplex study, unilateral/limited    (Results Pending)       EKG and Other Studies Reviewed on Admission:   · EKG: No EKG obtained  ** Please Note: This note has been constructed using a voice recognition system   **

## 2022-09-17 NOTE — DISCHARGE INSTRUCTIONS
Discharge Instructions - Orthopedics  Yolanda Stern 27 y o  male MRN: [id number]  Unit/Bed#: PACU 1    Weight Bearing Status:                                           Weight bearing as tolerated to the left lower extremity  DVT prophylaxis  As directed    Pain:  Continue analgesics as directed    Dressing Instructions:   Please keep clean, dry and intact until follow up     Appt Instructions: If you do not have your appointment, please call the clinic at [phone] t  Otherwise followup as scheduled     Contact the office sooner if you experience any increased numbness/tingling in the extremities

## 2022-09-17 NOTE — ANESTHESIA PREPROCEDURE EVALUATION
Procedure:  INCISION AND DRAINAGE (I&D) EXTREMITY (Left Knee)    Relevant Problems   CARDIO   (+) Hypertriglyceridemia   (+) Other hyperlipidemia      NEURO/PSYCH   (+) Anxiety             Anesthesia Plan  ASA Score- 2     Anesthesia Type- general with ASA Monitors  Additional Monitors:   Airway Plan: LMA  Plan Factors-    Chart reviewed  Induction- intravenous  Postoperative Plan-     Informed Consent- Anesthetic plan and risks discussed with patient  I personally reviewed this patient with the CRNA  Discussed and agreed on the Anesthesia Plan with the CRNA  César Carlson

## 2022-09-17 NOTE — OP NOTE
OPERATIVE REPORT  PATIENT NAME: Merlinda Rock    :  1990  MRN: [id number]  Pt Location: AN OR ROOM 04    SURGERY DATE: 2022    Surgeon(s) and Role:     * Niurka Mina,  - Primary     * Tanya Mendoza PA-C - Assisting in utilized during the case for assistance with prepping draping positioning retraction of soft tissue during approach and application of dressing as well as suture cutting    Preop Diagnosis:  Prepatellar bursitis of left knee [M70 42]    Post-Op Diagnosis Codes:     * Prepatellar bursitis of left knee [M70 42]    Procedure(s) (LRB):  INCISION AND DRAINAGE (I&D) EXTREMITY prepatella brusa (Left)    Specimen(s):  * No specimens in log *    Estimated Blood Loss:   Minimal    Drains:  * No LDAs found *    Anesthesia Type:   Choice    Operative Indications:  Prepatellar bursitis of left knee [M70 42]      Operative Findings:  Septic bursitis left    Complications:   None    Procedure and Technique:  Patient was identified in the preoperative area the left lower extremities marked today in HP had been reviewed  The patient was then brought back to the operative suite where he was sedated and intubated by Anesthesia  Left lower extremity prepped and draped in a sterile fashion in supine position and the proper time-out commenced identified the left lower extremity as an operative site  A midline incision approximately 2 in in length was made over the patella through the skin using 10 blade  Once encountered we saw some blood and very little fluid this is because we had just aspirated on the floor proximally and our goal   We subsequently irrigated using 6 L of sterile saline with cysto tubing  After cysto tubing had been done we also did some debridement with rongeur and curette  We took cultures after irrigation  We closed the incision with 0 Prolene in placed approximately 8 in of packing tape in the wound    We were able pull some of that out to make sure none of it was sutured in  We applied Xeroform 4 x 4 ABD Webril and an Ace wrap to the left lower extremity  Anesthesia was reversed the patient was taken back to PACU in stable condition     I was present for the entire procedure and A qualified resident physician was not available    Patient Disposition:  PACU         SIGNATURE: [unfilled]  DATE: September 17, 2022  TIME: 9:19 AM

## 2022-09-17 NOTE — ASSESSMENT & PLAN NOTE
Pre-Patelar Bursitis: s/p I&D by Orthopedic in OR today,   F/u Culture data  Cont  Pt on iv ceftriaxone  F/u Ortho recs  Synovial fluid studies did show neutrophil predominant  No crystals     Venous duplex LLE to r/o DVT

## 2022-09-17 NOTE — CONSULTS
Orthopedics   Alec Mess 27 y o  male MRN: [id number]  Unit/Bed#: AN VASCULAR LAB      Chief Complaint:   left knee pain    HPI:   27 y o male community ambulator complaining of left knee pain  He reports roughly 3 weeks ago he knelt down on a rock, and had onset of swelling about the knee  This improved with time until about 3 days ago when he went to turn around at work, felt a odd sensation in the knee, and developed worsening pain, erythema in the knee  His pain is described as over the patella, and pain is worse with weight bearing to the knee  He saw urgent care who placed him on Bactrim 3 days ago  He saw orthopedics yesterday, who advised ER evaluation for prepatellar bursitis  Review Of Systems:   · Skin: Normal  · Neuro: See HPI  · Musculoskeletal: See HPI  · 14 point review of systems negative except as stated above     Past Medical History:   Past Medical History:   Diagnosis Date    Anxiety 07/22/2021    Back pain     See Chiro regularly    Class 2 severe obesity with serious comorbidity and body mass index (BMI) of 37 0 to 37 9 in adult [facility])     History of asthma     Symptomatic when smoking  Last used inhaler in 2011      Hypertriglyceridemia     Hypertriglyceridemia     Other hemorrhoids     Other hyperlipidemia     Vitamin D deficiency        Past Surgical History:   Past Surgical History:   Procedure Laterality Date    COLONOSCOPY  2016    +Hemorrhoids       Family History:  Family history reviewed and non-contributory  Family History   Problem Relation Age of Onset    Hyperlipidemia Mother     Restless legs syndrome Mother     No Known Problems Father     ADD / ADHD Brother     No Known Problems Son        Social History:  Social History     Socioeconomic History    Marital status: Single     Spouse name: None    Number of children: 1    Years of education: None    Highest education level: None   Occupational History    Occupation:      Employer: [employer]   Tobacco Use    Smoking status: Former Smoker     Packs/day: 0 50     Years: 22 00     Pack years: 11 00     Types: Cigarettes     Start date: 1999     Quit date: 3/22/2021     Years since quittin 4    Smokeless tobacco: Former User     Types: Chew     Quit date: 10/1/2020   Vaping Use    Vaping Use: Former    Start date: 2013   Angeles Ramesh Quit date: 3/10/2021    Substances: Nicotine, CBD, Flavoring   Substance and Sexual Activity    Alcohol use: Yes     Alcohol/week: 1 0 standard drink     Types: 1 Cans of beer per week    Drug use: Not Currently     Types: Marijuana     Comment: Last use     Sexual activity: Yes     Partners: Female     Birth control/protection: OCP   Other Topics Concern    None   Social History Narrative    Single    Lives with girlfriend Phoebe Glass and son Emily Fatiam    1 Child - 1 Son         Social Determinants of Health     Financial Resource Strain: Not on file   Food Insecurity: Not on file   Transportation Needs: Not on file   Physical Activity: Not on file   Stress: Not on file   Social Connections: Not on file   Intimate Partner Violence: Not on file   Housing Stability: Not on file       Allergies:    Allergies   Allergen Reactions    Amoxicillin Swelling     Swollen taste buds, taste alteration, increased temperature sensitivity           Labs:  0   Lab Value Date/Time    HCT 37 2 2022 0513    HCT 39 9 2022 1923    HCT 43 9 2021 0958    HGB 12 3 2022 0513    HGB 13 1 2022 1923    HGB 15 1 2021 0958    WBC 6 31 2022 0513    WBC 6 56 2022 1923    WBC 4 90 2021 0958       Meds:    Current Facility-Administered Medications:     acetaminophen (TYLENOL) tablet 650 mg, 650 mg, Oral, Q6H PRN, Zhou Reina PA-C    cefTRIAXone (ROCEPHIN) IVPB (premix in dextrose) 2,000 mg 50 mL, 2,000 mg, Intravenous, Q24H, Zhou Reina PA-C, Last Rate: 100 mL/hr at 22 0456, 2,000 mg at 09/17/22 0456    enoxaparin (LOVENOX) subcutaneous injection 40 mg, 40 mg, Subcutaneous, Daily, [facility], PA-C    fish oil capsule 2,000 mg, 2,000 mg, Oral, BID, [facility], PA-C    niacin tablet 500 mg, 500 mg, Oral, BID With Meals, [facility], PA-C    Blood Culture:   Lab Results   Component Value Date    BLOODCX Received in Microbiology Lab  Culture in Progress  09/16/2022    BLOODCX Received in Microbiology Lab  Culture in Progress  09/16/2022       Wound Culture:   No results found for: WOUNDCULT    Ins and Outs:  I/O last 24 hours: In: -   Out: 520 [Urine:520]          Physical Exam:   /82 (BP Location: Left arm)   Pulse 80   Temp 97 8 °F (36 6 °C) (Oral)   Resp 18   SpO2 97%   Gen: No acute distress, resting comfortably in bed  HEENT: Eyes clear, moist mucus membranes, hearing intact  Respiratory: No audible wheezing or stridor  Cardiovascular: Well Perfused peripherally, 2+ distal pulse  Abdomen: nondistended, no peritoneal signs  Musculoskeletal: left lower extremity  · Skin intact, erythema over the knee  It does not continue down the leg  · Very minimal tenderness to palpation over the patella  · Some swelling about the knee, no palpable effusion  · He is able to both passive/active ROM 0-140 degrees  · Can perform straight leg raise  · Stable to varus/valgus stress, negative lachmans, posterior draw  · Sensation intact L3-S1  · 5/5 strength to hip flexion/extension, knee flexion/extension, ankle dorsi/plantar flexion, EHL/FHL  · 2+ DP/PT pulse  · Musculature is soft and compressible, no pain with passive stretch  · Leg lengths     Radiology:   I personally reviewed the films  X-rays: no acute osseous abnormality         Procedure- Orthopedics   Caleb Curry 27 y o  male MRN: [id number]  Unit/Bed#: AN VASCULAR LAB    Procedure: left knee aspiration    After sterile preparation of the skin overlying the knee local anesthetic was provided with 5cc of 1% lidocaine  An 18 gauge needle was then  inserted over the prepatellar bursa  Approx 4cc of murky/cloudy fluid was aspirated and sent for gram stain, culture, synovial WBC/RBC, and crystals  Sterile dressing was then applied  Pt tolerated the procedure well and was neurovascularly intact both pre and post procedure       _*_*_*_*_*_*_*_*_*_*_*_*_*_*_*_*_*_*_*_*_*_*_*_*_*_*_*_*_*_*_*_*_*_*_*_*_*_*_*_*_*    Assessment:  27 y o  male with left knee pre patellar bursitis  Aspiration with murky fluid  Plan:   · Weight bearing as tolerated  left lower extremity  · NPO  · Will plan for OR washout today  Consent obtained, on file  · Antibiotics per primary team    · Follow up on aspiration labs  · PT/OT when indicated  · Pain control per primary team    · DVT ppx per primary  · BMI 38 17 moderately obese  Recommend behavior modifications and nutrition  · Dispo: Ortho will follow  · Will need follow up with Dr Jin Lal upon discharge       Tanya Rubin PA-C

## 2022-09-17 NOTE — ASSESSMENT & PLAN NOTE
Patient presents with swollen left knee with overlying mild erythema that extends to the ankle and swelling of the LLE  · Management as stated above

## 2022-09-18 VITALS
WEIGHT: 311.9 LBS | OXYGEN SATURATION: 94 % | DIASTOLIC BLOOD PRESSURE: 68 MMHG | HEART RATE: 85 BPM | SYSTOLIC BLOOD PRESSURE: 113 MMHG | RESPIRATION RATE: 18 BRPM | BODY MASS INDEX: 37.97 KG/M2 | TEMPERATURE: 97.7 F

## 2022-09-18 LAB
ANION GAP SERPL CALCULATED.3IONS-SCNC: 7 MMOL/L (ref 4–13)
BASOPHILS # BLD AUTO: 0.01 THOUSANDS/ΜL (ref 0–0.1)
BASOPHILS NFR BLD AUTO: 0 % (ref 0–1)
BUN SERPL-MCNC: 13 MG/DL (ref 5–25)
CALCIUM SERPL-MCNC: 8.6 MG/DL (ref 8.4–10.2)
CHLORIDE SERPL-SCNC: 108 MMOL/L (ref 96–108)
CO2 SERPL-SCNC: 23 MMOL/L (ref 21–32)
CREAT SERPL-MCNC: 0.9 MG/DL (ref 0.6–1.3)
EOSINOPHIL # BLD AUTO: 0.01 THOUSAND/ΜL (ref 0–0.61)
EOSINOPHIL NFR BLD AUTO: 0 % (ref 0–6)
ERYTHROCYTE [DISTWIDTH] IN BLOOD BY AUTOMATED COUNT: 11.9 % (ref 11.6–15.1)
GFR SERPL CREATININE-BSD FRML MDRD: 111 ML/MIN/1.73SQ M
GLUCOSE SERPL-MCNC: 217 MG/DL (ref 65–140)
HCT VFR BLD AUTO: 37.7 % (ref 36.5–49.3)
HGB BLD-MCNC: 12.3 G/DL (ref 12–17)
IMM GRANULOCYTES # BLD AUTO: 0.1 THOUSAND/UL (ref 0–0.2)
IMM GRANULOCYTES NFR BLD AUTO: 1 % (ref 0–2)
LYMPHOCYTES # BLD AUTO: 1.61 THOUSANDS/ΜL (ref 0.6–4.47)
LYMPHOCYTES NFR BLD AUTO: 13 % (ref 14–44)
MCH RBC QN AUTO: 29.7 PG (ref 26.8–34.3)
MCHC RBC AUTO-ENTMCNC: 32.6 G/DL (ref 31.4–37.4)
MCV RBC AUTO: 91 FL (ref 82–98)
MONOCYTES # BLD AUTO: 1.01 THOUSAND/ΜL (ref 0.17–1.22)
MONOCYTES NFR BLD AUTO: 8 % (ref 4–12)
NEUTROPHILS # BLD AUTO: 9.97 THOUSANDS/ΜL (ref 1.85–7.62)
NEUTS SEG NFR BLD AUTO: 78 % (ref 43–75)
NRBC BLD AUTO-RTO: 0 /100 WBCS
PLATELET # BLD AUTO: 270 THOUSANDS/UL (ref 149–390)
PMV BLD AUTO: 9.7 FL (ref 8.9–12.7)
POTASSIUM SERPL-SCNC: 4.1 MMOL/L (ref 3.5–5.3)
RBC # BLD AUTO: 4.14 MILLION/UL (ref 3.88–5.62)
SODIUM SERPL-SCNC: 138 MMOL/L (ref 135–147)
WBC # BLD AUTO: 12.71 THOUSAND/UL (ref 4.31–10.16)

## 2022-09-18 PROCEDURE — 85025 COMPLETE CBC W/AUTO DIFF WBC: CPT | Performed by: INTERNAL MEDICINE

## 2022-09-18 PROCEDURE — 80048 BASIC METABOLIC PNL TOTAL CA: CPT | Performed by: INTERNAL MEDICINE

## 2022-09-18 PROCEDURE — 99239 HOSP IP/OBS DSCHRG MGMT >30: CPT | Performed by: INTERNAL MEDICINE

## 2022-09-18 PROCEDURE — 99024 POSTOP FOLLOW-UP VISIT: CPT | Performed by: PHYSICIAN ASSISTANT

## 2022-09-18 RX ADMIN — OMEGA-3 FATTY ACIDS CAP 1000 MG 2000 MG: 1000 CAP at 07:47

## 2022-09-18 RX ADMIN — SODIUM CHLORIDE 75 ML/HR: 0.45 INJECTION, SOLUTION INTRAVENOUS at 06:05

## 2022-09-18 RX ADMIN — ENOXAPARIN SODIUM 40 MG: 40 INJECTION SUBCUTANEOUS at 07:47

## 2022-09-18 RX ADMIN — Medication 500 MG: at 07:47

## 2022-09-18 RX ADMIN — CEFTRIAXONE 2000 MG: 2 INJECTION, SOLUTION INTRAVENOUS at 06:00

## 2022-09-18 NOTE — DISCHARGE SUMMARY
[facility]  Discharge- Kinga Leaver 1990, 27 y o  male MRN: [id number]  Unit/Bed#: S -01 Encounter: [id number]  Primary Care Provider: Josette Tavares DO   Date and time admitted to hospital: 9/16/2022  6:44 PM    * Acute pain of left knee  Assessment & Plan  Patient had venous duplex LLE to r/o DVT  Pre-Patelar Bursitis s/p I&D by Orthopedic in OR yesterday  Synovial fluid studies demonstrative of neutrophil predominance  F/u culture data, ortho recs  Plan:  - Patient to discharge home WBAT, will use cane as needed for ambulatory assistance  - See cellulitis plan below  - Will follow up as outpatient with orthopedic surgery  Cellulitis of left lower extremity  Assessment & Plan  Patient s/p I&D by Ortho team in [address] yesterday  Synovial fluid studies demonstrative of neutrophil predominance  F/u culture data, ortho recs  Received IV abx while in house  WBC today 12 71, previous 6 31  Believed to be 2/2 procedural intervention, patient is not meeting SIRS criteria and has no acute signs/syx of infection at this time  Plan:  - D/c on oral keflex as previously, 800mg q12h, to continue for the next 7d  - WBAT, will use cane PRN for ambulatory assistance  Hypertriglyceridemia  Assessment & Plan  Plan:  - Continue niacin and omega 3    Class 2 severe obesity with serious comorbidity and body mass index (BMI) of 36 0 to 36 9 in [facility])  Assessment & Plan  Counseled on diet and exercise      Medical Problems             Resolved Problems  Date Reviewed: 9/18/2022   None               Discharging Resident: Reba Overton DO  Discharging Attending: Stanley Guillen MD  PCP: Josette Tavares DO  Admission Date:   Admission Orders (From admission, onward)     Ordered        09/17/22 0903  Inpatient Admission  Once            09/16/22 2134  Place in Observation  Once                      Discharge Date: 09/18/22    Consultations During [facility]:  · Orthopedic surgery    Procedures Performed:   · Incision and drainage of prepatellar bursa of the left knee    Significant Findings / Test Results:   · 4cc of murky/cloudy fluid was aspirated from left prepatellar bursa and sent for culture/analysis    Incidental Findings:   · None     Test Results Pending at Discharge (will require follow up): · Blood cultures pending; currently negative     Outpatient Tests Requested:  · None    Complications:  None    Reason for Admission: left knee pain, redness, and swelling     Hospital Course:   Jaya Taylor is a 27 y o  male patient who originally presented to the hospital on 9/16/2022 due to worsening left knee pain, redness, and swelling spreading down to the left ankle  Uric acid level was found to be normal  The patient did not meet SIRS criteria  Orthopedic surgery was consulted, and patient was recommended for I&D of prepatellar bursa of the left knee  He underwent procedure without difficulty and aspirated fluid was sent for culture and analysis  The fluid cultures are still pending at this time, but his complaints are significantly improved  He will be transitioned back to oral antibiotics and is stable for discharge  Please see above list of diagnoses and related plan for additional information  Condition at Discharge: good    Discharge Day Visit / Exam:   Subjective:  Seen and examined at bedside  He states his left leg swelling has been improving and he is in less pain overall  He states he is having pain only when he is bearing weight on his left knee and locking it completely straight  He is able to ambulate with a cane without significant pain  He denies headache, chest pain, abdominal pain, shortness of breath, knee pain at rest, N/V/D/C, or other complaints  He is excited about the possibility of discharge today and states he will "take it easy" over the next few days to avoid potentially aggravating his knee    Vitals: Blood Pressure: 113/68 (09/18/22 9457)  Pulse: 85 (09/18/22 0748)  Temperature: 97 7 °F (36 5 °C) (09/18/22 0748)  Temp Source: Temporal (09/17/22 7686)  Respirations: 18 (09/17/22 2234)  Weight - Scale: (!) 141 kg (311 lb 14 4 oz) (09/18/22 0600)  SpO2: 94 % (09/18/22 0748)  Exam:   Physical Exam  Vitals and nursing note reviewed  Constitutional:       General: He is not in acute distress  Appearance: He is not ill-appearing, toxic-appearing or diaphoretic  HENT:      Head: Normocephalic and atraumatic  Right Ear: External ear normal       Left Ear: External ear normal       Nose: Nose normal       Mouth/Throat:      Mouth: Mucous membranes are moist       Pharynx: Oropharynx is clear  Eyes:      General: No scleral icterus  Right eye: No discharge  Left eye: No discharge  Extraocular Movements: Extraocular movements intact  Conjunctiva/sclera: Conjunctivae normal    Cardiovascular:      Rate and Rhythm: Normal rate and regular rhythm  Pulses: Normal pulses  Heart sounds: Normal heart sounds  Pulmonary:      Effort: Pulmonary effort is normal  No respiratory distress  Breath sounds: Normal breath sounds  No stridor  No wheezing, rhonchi or rales  Abdominal:      General: Abdomen is flat  Bowel sounds are normal  There is no distension  Palpations: Abdomen is soft  Tenderness: There is no abdominal tenderness  Musculoskeletal:         General: No tenderness  Normal range of motion  Cervical back: Normal range of motion  Right lower leg: No edema  Left lower leg: Edema (trace edema, appears to be decreased per chart review) present  Skin:     General: Skin is warm and dry  Coloration: Skin is not jaundiced  Findings: No rash  Neurological:      Mental Status: He is alert and oriented to person, place, and time  Mental status is at baseline     Psychiatric:         Mood and Affect: Mood normal          Behavior: Behavior normal  Discussion with Family: Will call to update  Discharge instructions/Information to patient and family:   See after visit summary for information provided to patient and family  Provisions for Follow-Up Care:  See after visit summary for information related to follow-up care and any pertinent home health orders  Disposition:   Home    Planned Readmission: None    Discharge Medications:  See after visit summary for reconciled discharge medications provided to patient and/or family        **Please Note: This note may have been constructed using a voice recognition system**

## 2022-09-18 NOTE — PLAN OF CARE
Problem: PAIN - ADULT  Goal: Verbalizes/displays adequate comfort level or baseline comfort level  Description: Interventions:  - Encourage patient to monitor pain and request assistance  - Assess pain using appropriate pain scale  - Administer analgesics based on type and severity of pain and evaluate response  - Implement non-pharmacological measures as appropriate and evaluate response  - Consider cultural and social influences on pain and pain management  - Notify physician/advanced practitioner if interventions unsuccessful or patient reports new pain  Outcome: Progressing     Problem: Potential for Falls  Goal: Patient will remain free of falls  Description: INTERVENTIONS:  - Educate patient/family on patient safety including physical limitations  - Instruct patient to call for assistance with activity   - Consult OT/PT to assist with strengthening/mobility   - Keep Call bell within reach  - Keep bed low and locked with side rails adjusted as appropriate  - Keep care items and personal belongings within reach  - Initiate and maintain comfort rounds  - Make Fall Risk Sign visible to staff  - Offer Toileting every  Hours, in advance of need  - Initiate/Maintain larm  - Obtain necessary fall risk management equipment:   - Apply yellow socks and bracelet for high fall risk patients  - Consider moving patient to room near nurses station  Outcome: Progressing     Problem: MOBILITY - ADULT  Goal: Maintain or return to baseline ADL function  Description: INTERVENTIONS:  -  Assess patient's ability to carry out ADLs; assess patient's baseline for ADL function and identify physical deficits which impact ability to perform ADLs (bathing, care of mouth/teeth, toileting, grooming, dressing, etc )  - Assess/evaluate cause of self-care deficits   - Assess range of motion  - Assess patient's mobility; develop plan if impaired  - Assess patient's need for assistive devices and provide as appropriate  - Encourage maximum independence but intervene and supervise when necessary  - Involve family in performance of ADLs  - Assess for home care needs following discharge   - Consider OT consult to assist with ADL evaluation and planning for discharge  - Provide patient education as appropriate  Outcome: Progressing  Goal: Maintains/Returns to pre admission functional level  Description: INTERVENTIONS:  - Perform BMAT or MOVE assessment daily    - Set and communicate daily mobility goal to care team and patient/family/caregiver  - Collaborate with rehabilitation services on mobility goals if consulted  - Perform Range of Motion  times a day  - Reposition patient every  hours    - Dangle patient  times a day  - Stand patient timmes a day  - Ambulate patient  times a day  - Out of bed to chair  times a day   - Out of bed for meals  times a day  - Out of bed for toileting  - Record patient progress and toleration of activity level   Outcome: Progressing

## 2022-09-18 NOTE — ASSESSMENT & PLAN NOTE
Patient s/p I&D by Ortho team in [address] yesterday  Synovial fluid studies demonstrative of neutrophil predominance  F/u culture data, ortho recs  Received IV abx while in house  WBC today 12 71, previous 6 31  Believed to be 2/2 procedural intervention, patient is not meeting SIRS criteria and has no acute signs/syx of infection at this time  Plan:  - D/c on oral keflex as previously, 800mg q12h, to continue for the next 7d  - WBAT, will use cane PRN for ambulatory assistance

## 2022-09-18 NOTE — ASSESSMENT & PLAN NOTE
Patient had venous duplex LLE to r/o DVT  Pre-Patelar Bursitis s/p I&D by Orthopedic in OR yesterday  Synovial fluid studies demonstrative of neutrophil predominance  F/u culture data, ortho recs  Plan:  - Patient to discharge home WBAT, will use cane as needed for ambulatory assistance  - See cellulitis plan below  - Will follow up as outpatient with orthopedic surgery

## 2022-09-18 NOTE — DISCHARGE INSTR - AVS FIRST PAGE
Dear Genaro Jones,     It was our pleasure to care for you here at [facility]  It is our hope that we were always able to exceed the expected standards for your care during your stay  You were hospitalized due to ***  You were cared for on the *** floor by Greer Taylor DO under the service of Carlin Gilmore MD with the [facility] who covers for your primary care physician (PCP), Livia Calvillo DO, while you were hospitalized  If you have any questions or concerns related to this hospitalization, you may contact us at [phone]  For follow up as well as any medication refills, we recommend that you follow up with your primary care physician  A registered nurse will reach out to you by phone within a few days after your discharge to answer any additional questions that you may have after going home  However, at this time we provide for you here, the most important instructions / recommendations at discharge:     Notable Medication Adjustments -   We stopped your Bactrim when you came to the hospital in favor of IV antibiotics  We would like you to resume your Bactrim at discharge  Please take this every 12 hours for the next 7 days  Testing Required after Discharge -   None  Important follow up information -   Follow up on your blood cultures  Follow up with orthopedic surgery; a referral has been placed on your behalf  Other Instructions -   Please call your PCP within 2-3 days to schedule a follow-up visit  Please review this entire after visit summary as additional general instructions including medication list, appointments, activity, diet, any pertinent wound care, and other additional recommendations from your care team that may be provided for you        Sincerely,     Greer Taylor DO

## 2022-09-18 NOTE — PROGRESS NOTES
Progress Note - Orthopedics   Charis Kaufman 27 y o  male MRN: [id number]  Unit/Bed#: S -01      Subjective:    32 y o male  Seen and examined at bedside  Has some mild pain in the knee  Has been ambulatory without issue  No major complaints this morning  Akshat Room for discharge when able  Labs:  0   Lab Value Date/Time    HCT 37 7 09/18/2022 0618    HCT 37 2 09/17/2022 0513    HCT 39 9 09/16/2022 1923    HGB 12 3 09/18/2022 0618    HGB 12 3 09/17/2022 0513    HGB 13 1 09/16/2022 1923    WBC 12 71 (H) 09/18/2022 0618    WBC 6 31 09/17/2022 0513    WBC 6 56 09/16/2022 1923       Meds:    Current Facility-Administered Medications:     acetaminophen (TYLENOL) tablet 650 mg, 650 mg, Oral, Q6H PRN, Parthenia Priestly, PA-C    cefTRIAXone (ROCEPHIN) IVPB (premix in dextrose) 2,000 mg 50 mL, 2,000 mg, Intravenous, Q24H, Parthenia Priestly, PA-C, Last Rate: 100 mL/hr at 09/18/22 0600, 2,000 mg at 09/18/22 0600    enoxaparin (LOVENOX) subcutaneous injection 40 mg, 40 mg, Subcutaneous, Daily, Parthenia Priestly, PA-C, 40 mg at 09/17/22 1044    fish oil capsule 2,000 mg, 2,000 mg, Oral, BID, Parthenia Priestly, PA-C, 2,000 mg at 09/17/22 1759    ketorolac (TORADOL) injection 30 mg, 30 mg, Intravenous, Q6H PRN, Urbano Barrow MD    niacin tablet 500 mg, 500 mg, Oral, BID With Meals, Parthenia Priestly, PA-C, 500 mg at 09/17/22 1132    sodium chloride infusion 0 45 %, 75 mL/hr, Intravenous, Continuous, Gisel Villanueva MD, Last Rate: 75 mL/hr at 09/18/22 0605, 75 mL/hr at 09/18/22 0605    Blood Culture:   Lab Results   Component Value Date    BLOODCX No Growth at 24 hrs  09/16/2022    BLOODCX No Growth at 24 hrs  09/16/2022       Wound Culture:   No results found for: WOUNDCULT    Ins and Outs:  I/O last 24 hours:   In: 2087 1 [I V :2087 1]  Out: -           Physical:  Vitals:    09/17/22 2234   BP: 128/63   Pulse: 89   Resp: 18   Temp: (!) 97 4 °F (36 3 °C)   SpO2: 93%     Musculoskeletal: left Lower Extremity  · Dressing taken down  No active bleeding  Sutures intact  Packing removed at bedside without issue  Wound re-dressed with xeroform, gauze, ABDs and ace wrap  · Skin surrounding surgical incision with improved erythema  · Mild alex-incisional ttp  · SILT s/s/sp/dp/t  +fhl/ehl, +ankle dorsi/plantar flexion  · 2+ DP pulse    Assessment:    32 y o male POD 1 incision and drainage of the left lower extremity for prepatellar bursitis  Doing well today  Plan:  · WBAT to the left lower extremity  · Hgb 12 3  · Pain control per primary team  · Antibiotics per primary team - follow up on final cultures  [id number] Sherrie Caro for discharge from ortho perspective with oral abx     · Medical management per primary team  · PT/OT  · DVT ppx per primary   · Dispo: [id number] Sherrie Caro for discharge from ortho perspective with close outpatient follow up with Dr Sathish Neff PA-C

## 2022-09-19 NOTE — UTILIZATION REVIEW
Inpatient Admission Authorization Request   NOTIFICATION OF INPATIENT ADMISSION/INPATIENT AUTHORIZATION REQUEST   SERVICING FACILITY:   [address]  Tax ID: [id number]  NPI: [id number]  Place of Service: Inpatient [address]  Place of Service Code: 24     ATTENDING PROVIDER:  Attending Name and NPI#: Jass Wheatfarshad Vianney Martinez, [address] [[id number]]  Address: [address]  Phone: [phone]     UTILIZATION REVIEW CONTACT:  Mello Johnson, Utilization   Network Utilization Review Department  Phone: [phone]  Fax: [phone]  Email: [email or web address]     PHYSICIAN ADVISORY SERVICES:  FOR [id number] REVIEW - MEDICAL NECESSITY DENIAL  Phone: [phone]  Fax: [phone]  Email: [email or web address]     TYPE OF REQUEST:  Inpatient Status     ADMISSION INFORMATION:  ADMISSION DATE/TIME: 9/17/22  9:03 AM  PATIENT DIAGNOSIS CODE/DESCRIPTION:  Cellulitis [L03 90]  Knee swelling [M25 469]  Prepatellar bursitis of left knee [M70 42]  Acute pain of left knee [M25 562]  DISCHARGE DATE/TIME: 9/18/2022  1:40 PM   IMPORTANT INFORMATION:  Please contact Mello Johnson directly with any questions or concerns regarding this request  Department voicemails are confidential     Send requests for admission clinical reviews, concurrent reviews, approvals, and administrative denials due to lack of clinical to fax [phone]             Initial Clinical Review    Admission: Date/Time/Statement:   Admission Orders (From admission, onward)     Ordered        09/17/22 0903  Inpatient Admission  Once            09/16/22 2134  Place in Observation  Once                      Orders Placed This Encounter    Inpatient Admission     Standing Status:   Standing     Number of Occurrences:   1     Order Specific Question:   Level of Care     Answer:   Med Surg [16]     Order Specific Question:   Estimated length of stay     Answer:   More than 2 Midnights     Order Specific Question:   Certification     Answer:   I certify that inpatient services are medically necessary for this patient for a duration of greater than two midnights  See H&P and MD Progress Notes for additional information about the patient's course of treatment  ED Arrival Information     Expected   -    Arrival   9/16/2022 16:50    Acuity   Urgent            Means of arrival   Walk-In    Escorted by   Self    Service   Hospitalist    Admission type   Urgent            Arrival complaint   Fluid in knee            Chief Complaint   Patient presents with    Knee Swelling     Pt reports L knee swelling, original injury was 3 weeks ago, reports last week was getting better and this weekend felt a pop and started to swell again, was told to come to ER to get it drained and for abx     Initial Presentation: 27 y o  male to ER from home,   Reports approx 2-3  wks ago he struck left knee on small rock - no significant injury noted at the time but a week later "twisted" same knee and "felt a pop"   This even was followed by significant swelling of knee:  Seen at Urgent care  9/14,  Xray neg, started on Bactrim 2/2 concern for possible infection  FUP w/Ortho office today who sent pt to ER 2/2  concern for left septic prepatellar bursitis  Has completed 2D  Bactrim, denies fever/chills  Amb w/o difficulty  EXAM:  mild erythema of the left knee and anterior shin to ankle, boggy prominence on anterior patella, and swelling of LLE  Admit OBS Status,   MS Level of care for   Management of  Suspected L septic prepatellar bursitis w/celluitis (erythema extends from knee to ankle)   Will keep NPO,  Initiate  IVF @  125,  IVAB, fup blood culture,  Obtain venous duplex to r/o DVT  (result - NEG)   Consult  Ortho  Date:   9/17     Day 2:   CHANGED to INPATIENT  STATUS,  MS Level of care   Due to  Need for ongoing IVAB's w/serial assessment of  Left knee    9/17 ORTHO  Aspirated left knee at bedside,  Then  Took pt to OR/ general anesthesia   for  I&D: findings indicated septic bursitis  (intraop cultures initiated)    POSTOP:   Routine postop care,  B/l LE scd's;  Daily wgt;  I/O q shift;  Hourly inc spirometry; Ambulate QID  (WBAT LLE)  w/assist - PT/OT evals  -----------------------------------------------------------------------------------------------------------------------      ED Triage Vitals   Temperature Pulse Respirations Blood Pressure SpO2   09/16/22 1658 09/16/22 1658 09/16/22 1658 09/16/22 1658 09/16/22 1658   98 2 °F (36 8 °C) 84 18 119/73 99 %      Temp Source Heart Rate Source Patient Position - Orthostatic VS BP Location FiO2 (%)   09/16/22 1658 09/16/22 1658 09/16/22 1658 09/16/22 1658 --   Oral Monitor Sitting Left arm       Pain Score       09/16/22 2323       1          Wt Readings from Last 1 Encounters:   09/18/22 (!) 141 kg (311 lb 14 4 oz)     Additional Vital Signs:   09/17/22 0945 97 7 °F (36 5 °C) 80 16 115/62 -- 98 % None (Room air) WDL --   09/17/22 0930 97 7 °F (36 5 °C) 83 16 113/57 -- 96 % None (Room air) -- --   09/17/22 0925 97 7 °F (36 5 °C) 80 18 115/61 -- 93 % None (Room air) WDL --   09/17/22 0832 98 1 °F (36 7 °C) -- 20 140/80 -- 100 % None (Room air) -- --   09/17/22 07:34:02 97 8 °F (36 6 °C) 80 18 142/82 102 97 % None (Room air) -- Sitting   09/17/22 0515 -- -- -- -- -- 97 % None (Room air) -- --   09/16/22 2323 -- -- -- -- -- -- None (Room air) -- --   09/16/22 23:08:13 97 9 °F (36 6 °C) 81 18 112/69 83 96 % None (Room air) -- Sitting       Pertinent Labs/Diagnostic Test Results:   ekg not available     VAS lower limb venous duplex study, unilateral/limited   Final Result by Juan Ron MD (09/17 8645)      9/17   RIGHT LOWER LIMB LIMITED:  Evaluation shows no evidence of thrombus in the common femoral vein  Doppler evaluation shows a normal response to augmentation maneuvers       LEFT LOWER LIMB:  No evidence of acute or chronic deep vein thrombosis  No evidence of superficial thrombophlebitis noted  Doppler evaluation shows a normal response to augmentation maneuvers  Popliteal, posterior tibial and anterior tibial arterial Doppler waveforms are  triphasic    ----------------------------------------------------------------------------------                Results from last 7 days   Lab Units 09/18/22  0618 09/17/22  0513 09/16/22  1923   WBC Thousand/uL 12 71* 6 31 6 56   HEMOGLOBIN g/dL 12 3 12 3 13 1   HEMATOCRIT % 37 7 37 2 39 9   PLATELETS Thousands/uL 270 246 281   NEUTROS ABS Thousands/µL 9 97* 3 64 3 60         Results from last 7 days   Lab Units 09/18/22  0618 09/17/22  0513 09/16/22  1923   SODIUM mmol/L 138 137 140   POTASSIUM mmol/L 4 1 3 9 3 7   CHLORIDE mmol/L 108 106 104   CO2 mmol/L 23 24 28   ANION GAP mmol/L 7 7 8   BUN mg/dL 13 12 15   CREATININE mg/dL 0 90 0 85 1 03   EGFR ml/min/1 73sq m 111 113 94   CALCIUM mg/dL 8 6 8 3* 9 0             Results from last 7 days   Lab Units 09/18/22  0618 09/17/22  0513 09/16/22  1923   GLUCOSE RANDOM mg/dL 217* 98 96     Results from last 7 days   Lab Units 09/17/22  0926 09/17/22  0800 09/16/22  1923   BLOOD CULTURE   --   --  No Growth at 48 hrs  No Growth at 48 hrs     GRAM STAIN RESULT  No Polys or Bacteria seen No Polys or Bacteria seen  --    WOUND CULTURE  1 colony Staphylococcus aureus*  --   --    BODY FLUID CULTURE, STERILE   --  2+ Growth of Staphylococcus aureus*  --      Results from last 7 days   Lab Units 09/17/22  0801   TOTAL COUNTED  100   NEUTROPHIL % (SYNOVIAL) % 93   WBC FLUID /ul 33,768*   RBC, SYNOVIAL  35,000*   CRYSTALS, SYNOVIAL FLUID  No Crystals Seen       ED Treatment:   Medication Administration from 09/16/2022 1650 to 09/16/2022 2301       Date/Time Order Dose Route Action     09/16/2022 1931 sodium chloride 0 9 % infusion 125 mL/hr Intravenous New Bag     09/16/2022 1936 cefepime (MAXIPIME) IVPB (premix in dextrose) 2,000 mg 50 mL 2,000 mg Intravenous New Bag     09/16/2022 2017 vancomycin (VANCOCIN) 2,000 mg in sodium chloride 0 9 % 500 mL IVPB 2,000 mg Intravenous New Bag        Past Medical History:   Diagnosis Date    Anxiety 07/22/2021    Back pain     See Chiro regularly    Class 2 severe obesity with serious comorbidity and body mass index (BMI) of 37 0 to 37 9 in [facility])     History of asthma     Symptomatic when smoking  Last used inhaler in 2011   Hypertriglyceridemia     Hypertriglyceridemia     Other hemorrhoids     Other hyperlipidemia     Vitamin D deficiency      Present on Admission:   Hypertriglyceridemia      Admitting Diagnosis: Cellulitis [L03 90]  Knee swelling [M25 469]  Prepatellar bursitis of left knee [M70 42]  Acute pain of left knee [M25 562]  Age/Sex: 27 y o  male  Admission Orders:  See above note  9/17   Scheduled Medications:  No current facility-administered medications for this encounter  Continuous IV Infusions:  No current facility-administered medications for this encounter  PRN Meds:  No current facility-administered medications for this encounter  IP CONSULT TO ORTHOPEDIC SURGERY    Network Utilization Review Department  ATTENTION: Please call with any questions or concerns to [phone] and carefully listen to the prompts so that you are directed to the right person  All voicemails are confidential   Rommel Piedra all requests for admission clinical reviews, approved or denied determinations and any other requests to dedicated fax number below belonging to the campus where the patient is receiving treatment   List of dedicated fax numbers for the Facilities:  [address] DENIALS (Administrative/Medical Necessity) [phone]   [address] (Maternity/NICU/Pediatrics) 261 [facility],[address] Floor [place] [address] [phone] 8049 [facility] [phone]     [address] [address] [place] [address] Jatin Altamirano 1481 [address] [place] [address] [phone]

## 2022-09-19 NOTE — UTILIZATION REVIEW
Continued Stay Review    Date: 9/18                          Current Patient Class: Inpatient  Current Level of Care: Med Surg    HPI:32 y o  male initially admitted on 9/17    Assessment/Plan:   9/17 INCISION AND DRAINAGE (I&D) EXTREMITY prepatella brusa (Left)  Operative Findings:  Septic bursitis left    9/18  Per Orthopedic; WBAT to the left lower extremity  Continue Iv antibiotics  F/u final cultures  Pain control  Vital Signs:   09/18/22 07:48:39 97 7 °F (36 5 °C) 85 -- 113/68 83 94 % -- -- --   09/17/22 22:34:28 97 4 °F (36 3 °C) Abnormal  89 18 128/63 85 93 % -- -- --   09/17/22 15:06:58 97 9 °F (36 6 °C) 93 14 118/76 90 96 % -- -- --   09/17/22 0945 97 7 °F (36 5 °C) 80 16 115/62 -- 98 % None (Room air) WDL --   09/17/22 0930 97 7 °F (36 5 °C) 83 16 113/57 -- 96 % None (Room air) --      Pertinent Labs/Diagnostic Results:       Results from last 7 days   Lab Units 09/18/22 0618 09/17/22  0513 09/16/22 1923   WBC Thousand/uL 12 71* 6 31 6 56   HEMOGLOBIN g/dL 12 3 12 3 13 1   HEMATOCRIT % 37 7 37 2 39 9   PLATELETS Thousands/uL 270 246 281   NEUTROS ABS Thousands/µL 9 97* 3 64 3 60         Results from last 7 days   Lab Units 09/18/22 0618 09/17/22  0513 09/16/22  1923   SODIUM mmol/L 138 137 140   POTASSIUM mmol/L 4 1 3 9 3 7   CHLORIDE mmol/L 108 106 104   CO2 mmol/L 23 24 28   ANION GAP mmol/L 7 7 8   BUN mg/dL 13 12 15   CREATININE mg/dL 0 90 0 85 1 03   EGFR ml/min/1 73sq m 111 113 94   CALCIUM mg/dL 8 6 8 3* 9 0             Results from last 7 days   Lab Units 09/18/22 0618 09/17/22  0513 09/16/22  1923   GLUCOSE RANDOM mg/dL 217* 98 96         Results from last 7 days   Lab Units 09/17/22  0926 09/17/22  0800 09/16/22  1923   BLOOD CULTURE   --   --  No Growth at 48 hrs  No Growth at 48 hrs     GRAM STAIN RESULT  No Polys or Bacteria seen No Polys or Bacteria seen  --    WOUND CULTURE  1 colony Staphylococcus aureus*  --   --    BODY FLUID CULTURE, STERILE   --  2+ Growth of Staphylococcus aureus*  --      Results from last 7 days   Lab Units 09/17/22  0801   TOTAL COUNTED  100   NEUTROPHIL % (SYNOVIAL) % 93   WBC FLUID /ul 33,768*   RBC, SYNOVIAL  35,000*   CRYSTALS, SYNOVIAL FLUID  No Crystals Seen     Medications:   Scheduled Medications:  cefTRIAXone, 2,000 mg, Intravenous, Q24H  enoxaparin, 40 mg, Subcutaneous, Daily  fish oil, 2,000 mg, Oral, BID  niacin, 500 mg, Oral, BID With Meals        Continuous IV Infusions:  sodium chloride infusion 0 45 %  Rate: 75 mL/hr Dose: 75 mL/hr  Freq: Continuous Route: IV  Last Dose: 75 mL/hr (09/18/22 0605)  Start: 09/17/22 1345 End: 09/18/22 1540       lactated ringers infusion  Rate: 125 mL/hr Dose: 125 mL/hr  Freq: Continuous Route: IV  Last Dose: Stopped (09/17/22 1543)  Start: 09/17/22 0830 End: 09/17/22 1341       PRN Meds:  acetaminophen, 650 mg, Oral, Q6H PRN  fentaNYL, 50 mcg, Intravenous, Q3 min PRN  HYDROmorphone, 0 5 mg, Intravenous, Q5 Min PRN  lidocaine (PF), 0 5 mL, Infiltration, Once PRN  ondansetron, 4 mg, Intravenous, Once PRN       Discharge Plan: D    Network Utilization Review Department  ATTENTION: Please call with any questions or concerns to [phone] and carefully listen to the prompts so that you are directed to the right person  All voicemails are confidential   Felipe Summers all requests for admission clinical reviews, approved or denied determinations and any other requests to dedicated fax number below belonging to the campus where the patient is receiving treatment   List of dedicated fax numbers for the Facilities:  [address] DENIALS (Administrative/Medical Necessity) [phone]   [address] (Maternity/NICU/Pediatrics) [phone]   [address]  [address] Medical [place] [phone] [address] [place] [address] Jatin Altamirano 1481 [address] [facility] [phone]

## 2022-09-20 LAB
BACTERIA SPEC ANAEROBE CULT: NORMAL
BACTERIA WND AEROBE CULT: ABNORMAL
GRAM STN SPEC: ABNORMAL

## 2022-09-21 ENCOUNTER — TRANSITIONAL CARE MANAGEMENT (OUTPATIENT)
Dept: FAMILY MEDICINE CLINIC | Facility: CLINIC | Age: 32
End: 2022-09-21

## 2022-09-21 LAB
BACTERIA SPEC BFLD CULT: ABNORMAL
GRAM STN SPEC: ABNORMAL

## 2022-09-22 ENCOUNTER — TELEPHONE (OUTPATIENT)
Dept: OBGYN CLINIC | Facility: HOSPITAL | Age: 32
End: 2022-09-22

## 2022-09-22 ENCOUNTER — OFFICE VISIT (OUTPATIENT)
Dept: OBGYN CLINIC | Facility: CLINIC | Age: 32
End: 2022-09-22
Payer: COMMERCIAL

## 2022-09-22 ENCOUNTER — OFFICE VISIT (OUTPATIENT)
Dept: OBGYN CLINIC | Facility: CLINIC | Age: 32
End: 2022-09-22

## 2022-09-22 VITALS
WEIGHT: 315 LBS | DIASTOLIC BLOOD PRESSURE: 84 MMHG | SYSTOLIC BLOOD PRESSURE: 124 MMHG | BODY MASS INDEX: 38.36 KG/M2 | HEIGHT: 76 IN | HEART RATE: 76 BPM

## 2022-09-22 VITALS — BODY MASS INDEX: 38.24 KG/M2 | HEIGHT: 76 IN | WEIGHT: 314 LBS

## 2022-09-22 DIAGNOSIS — M25.562 ACUTE PAIN OF LEFT KNEE: ICD-10-CM

## 2022-09-22 DIAGNOSIS — Z98.890 STATUS POST INCISION AND DRAINAGE: Primary | ICD-10-CM

## 2022-09-22 DIAGNOSIS — M71.162 SEPTIC PREPATELLAR BURSITIS OF LEFT KNEE: Primary | ICD-10-CM

## 2022-09-22 DIAGNOSIS — T81.31XA DEHISCENCE OF OPERATIVE WOUND, INITIAL ENCOUNTER: ICD-10-CM

## 2022-09-22 DIAGNOSIS — M70.42 PREPATELLAR BURSITIS OF LEFT KNEE: ICD-10-CM

## 2022-09-22 LAB
BACTERIA BLD CULT: NORMAL
BACTERIA BLD CULT: NORMAL

## 2022-09-22 PROCEDURE — 20610 DRAIN/INJ JOINT/BURSA W/O US: CPT | Performed by: ORTHOPAEDIC SURGERY

## 2022-09-22 PROCEDURE — 99024 POSTOP FOLLOW-UP VISIT: CPT | Performed by: ORTHOPAEDIC SURGERY

## 2022-09-22 RX ORDER — SULFAMETHOXAZOLE AND TRIMETHOPRIM 800; 160 MG/1; MG/1
1 TABLET ORAL EVERY 12 HOURS SCHEDULED
Qty: 10 TABLET | Refills: 0 | Status: SHIPPED | OUTPATIENT
Start: 2022-09-22 | End: 2022-09-27

## 2022-09-22 RX ORDER — LIDOCAINE HYDROCHLORIDE 10 MG/ML
5 INJECTION, SOLUTION INFILTRATION; PERINEURAL
Status: COMPLETED | OUTPATIENT
Start: 2022-09-22 | End: 2022-09-22

## 2022-09-22 RX ADMIN — LIDOCAINE HYDROCHLORIDE 5 ML: 10 INJECTION, SOLUTION INFILTRATION; PERINEURAL at 13:22

## 2022-09-22 NOTE — TELEPHONE ENCOUNTER
Spoke to patient  He stated the incision opened up since the stitches were removed today  Reports that the incision is draining the same fluid it was when it was being packed and the cut is open  Spoke to Gali who approved patient coming back in at 1 pm to have it looked at and possibly steri stripped  Patient advised of above and will come back at 1pm     Sending for force on

## 2022-09-22 NOTE — LETTER
September 22, 2022     Patient: Jada Lindsey  YOB: 1990  Date of Visit: 9/22/2022      To Whom it May Concern:    Jada Lindsey is under my professional care  Juditalex Mosher was seen in my office on 9/22/2022  Judit Mosher may return to work with limitations No squatting kneeling on the left knee for a minimum of 2 more weeks       If you have any questions or concerns, please don't hesitate to call           Sincerely,          Jimbo Grossman DO        CC: Jada Lindsey

## 2022-09-22 NOTE — PROGRESS NOTES
Assessment:   Status Post  INCISION AND DRAINAGE (I&D) EXTREMITY prepatella brusa - Left on 9/17/2022 with wound dehiscence    Plan:   Weight bearing:  As tolerated  Avoid deep flexion of the knee  Continue with dry dressings over the knee until clean  Will extend length of Bactrim for 5 additional days  Staples were used to reclosed wound today in the office      Follow Up:  2  week(s)    To Do Next Visit:  Staples out      CHIEF COMPLAINT:  Chief Complaint   Patient presents with    Left Knee - Post-op         SUBJECTIVE:  Conor Marcus is a 27y o  year old male who presents for follow up after Jatin [facility] [place] [address] (I&D) EXTREMITY prepatella brusa - Left  Today patient has dehiscence of surgical wound  Sutures removed today  While walking at work the wound dehisced  He noticed bloody drainage  PHYSICAL EXAMINATION:    MUSCULOSKELETAL EXAMINATION:  Left knee  General: Alert and oriented x 3, WNWD, NAD  Incision:  Dehisced  No alex-incisional erythema  no d purulence  Range of Motion: As expected  Neurovascular status: Sensation intact to light touch L1-S1   Motor intact L1-S1  Pulses:  Intact        STUDIES REVIEWED:        PROCEDURES PERFORMED:  Large joint arthrocentesis  Universal Protocol:  Consent: Verbal consent obtained  Risks and benefits: risks, benefits and alternatives were discussed  Consent given by: patient  Patient identity confirmed: verbally with patient    Supporting Documentation  Indications: Wound dehiscence     Procedure Details  Preparation: Patient was prepped and draped in the usual sterile fashion  Needle size: 25 G  Medications administered: 5 mL lidocaine 1 %    Patient tolerance: patient tolerated the procedure well with no immediate complications  Dressing:  Sterile dressing applied    Staples were used to reapproximate and close the longitudinal incision over the anterior knee

## 2022-09-22 NOTE — PROGRESS NOTES
Assessment:  1  Status post incision and drainage     2  Prepatellar bursitis of left knee  Ambulatory Referral to Orthopedic Surgery   3  Acute pain of left knee  Ambulatory Referral to Orthopedic Surgery     Patient Active Problem List   Diagnosis    Hypertriglyceridemia    Class 2 severe obesity with serious comorbidity and body mass index (BMI) of 36 0 to 36 9 in adult [facility])    Anxiety    Other hyperlipidemia    Vitamin D deficiency    Acute pain of left knee    Cellulitis of left lower extremity           Plan      27 y o  male 6 days status post incision and drainage of septic prepatellar bursitis  Incision is clean dry and intact  No signs of infection  Sutures were removed today  Patient can get wet, pat dry, do not soak  Follow up in 2 weeks  Subjective:     Patient ID:    Chief Complaint:Praneeth Alexis 27 y o  male      HPI    Patient comes in today for follow-up status post right knee septic prepatellar bursitis incision and drainage  Patient states he has no pain  He does still have some edema in the lower extremity         The following portions of the patient's history were reviewed and updated as appropriate: allergies, current medications, past family history, past social history, past surgical history and problem list         Social History     Socioeconomic History    Marital status: Single     Spouse name: Not on file    Number of children: 1    Years of education: Not on file    Highest education level: Not on file   Occupational History    Occupation:      Employer: [employer]   Tobacco Use    Smoking status: Former Smoker     Packs/day: 0 50     Years: 22 00     Pack years: 11 00     Types: Cigarettes     Start date: 1999     Quit date: 3/22/2021     Years since quittin 5    Smokeless tobacco: Former User     Types: [address] date: 10/1/2020   Vaping Use    Vaping Use: Former    Start date: 2013   Lul Guzman Quit date: 3/10/2021    Substances: Nicotine, CBD, Flavoring   Substance and Sexual Activity    Alcohol use: Yes     Alcohol/week: 1 0 standard drink     Types: 1 Cans of beer per week    Drug use: Not Currently     Types: Marijuana     Comment: Last use 1/21    Sexual activity: Yes     Partners: Female     Birth control/protection: OCP   Other Topics Concern    Not on file   Social History Narrative    Single    Lives with girlfriend Rowan Collier and son Idania Velazquez    1 Child - 1 Son         Social Determinants of Health     Financial Resource Strain: Not on file   Food Insecurity: Not on file   Transportation Needs: Not on file   Physical Activity: Not on file   Stress: Not on file   Social Connections: Not on file   Intimate Partner Violence: Not on file   Housing Stability: Not on file     Past Medical History:   Diagnosis Date    Anxiety 07/22/2021    Back pain     See Chiro regularly    Class 2 severe obesity with serious comorbidity and body mass index (BMI) of 37 0 to 37 9 in [facility])     History of asthma     Symptomatic when smoking  Last used inhaler in 2011   Hypertriglyceridemia     Hypertriglyceridemia     Other hemorrhoids     Other hyperlipidemia     Vitamin D deficiency      Past Surgical History:   Procedure Laterality Date    COLONOSCOPY  2016    +Hemorrhoids    INCISION AND DRAINAGE OF WOUND Left 9/17/2022    Procedure: INCISION AND DRAINAGE (I&D) EXTREMITY prepatella Rafael Salmeron;   Surgeon: Malou Russo DO;  Location: [facility];  Service: Orthopedics     Allergies   Allergen Reactions    Amoxicillin Swelling     Swollen taste buds, taste alteration, increased temperature sensitivity     Current Outpatient Medications on File Prior to Visit   Medication Sig Dispense Refill    cholecalciferol (VITAMIN D3) 400 units tablet Take 400 Units by mouth daily      ibuprofen (MOTRIN) 800 mg tablet Take 1 tablet (800 mg total) by mouth every 6 (six) hours as needed for mild pain (Take as needed with meals or snack) 30 tablet 0    Multiple Vitamin (MULTIVITAMIN) tablet Take 1 tablet by mouth daily       niacin 500 mg tablet Take 500 mg by mouth 2 (two) times a day with meals      omega-3-acid ethyl esters (LOVAZA) 1 g capsule Take 2 capsules (2 g total) by mouth 2 (two) times a day 120 capsule 1    sulfamethoxazole-trimethoprim (BACTRIM DS) 800-160 mg per tablet Take 1 tablet by mouth every 12 (twelve) hours for 10 days 20 tablet 0    ergocalciferol (ERGOCALCIFEROL) 1 25 MG (94353 UT) capsule Take 1 capsule (50,000 Units total) by mouth once a week for 12 doses 12 capsule 0     No current facility-administered medications on file prior to visit  Objective:    Review of Systems   Constitutional: Negative for chills and fever  HENT: Negative for ear pain and sore throat  Eyes: Negative for pain and visual disturbance  Respiratory: Negative for cough and shortness of breath  Cardiovascular: Negative for chest pain and palpitations  Gastrointestinal: Negative for abdominal pain and vomiting  Genitourinary: Negative for dysuria and hematuria  Musculoskeletal: Negative for arthralgias and back pain  Skin: Negative for color change and rash  Neurological: Negative for seizures and syncope  All other systems reviewed and are negative  Right Knee Exam     Tenderness   The patient is experiencing no tenderness  Other   Erythema: absent  Scars: present  Sensation: normal  Swelling: mild    Comments:  Incision clean dry and intact   No signs of infection            Physical Exam  HENT:      Head: Normocephalic  Eyes:      Pupils: Pupils are equal, round, and reactive to light  Cardiovascular:      Rate and Rhythm: Normal rate  Pulmonary:      Effort: Pulmonary effort is normal    Musculoskeletal:         General: Normal range of motion  Cervical back: Normal range of motion  Skin:     General: Skin is warm and dry     Neurological:      General: No focal deficit present  Mental Status: He is alert  Psychiatric:         Behavior: Behavior normal          Procedures  No Procedures performed today        Scribe Attestation    I,:  Sheila Frank am acting as a scribe while in the presence of the attending physician :       I,:  Reena Castaneda DO personally performed the services described in this documentation    as scribed in my presence :             Portions of the record may have been created with voice recognition software   Occasional wrong word or "sound a like" substitutions may have occurred due to the inherent limitations of voice recognition software   Read the chart carefully and recognize, using context, where substitutions have occurred

## 2022-09-23 NOTE — UTILIZATION REVIEW
Please note, this request was originally sent over on 9/19/22 and we have yet to receive a determination        Inpatient Admission Authorization Request   NOTIFICATION OF INPATIENT ADMISSION/INPATIENT AUTHORIZATION REQUEST   SERVICING FACILITY:   [address] [place] [address]  Tax ID: [id number]  NPI: [id number]  Place of Service: Inpatient [address]  Place of Service Code: 24     ATTENDING PROVIDER:  Attending Name and NPI#: Charlene Elderma [[id number]]  Address: [address]  Phone: [phone]     UTILIZATION REVIEW CONTACT:  Selwyn Cochran, Utilization   Network Utilization Review Department  Phone: [phone]  Fax: [phone]  Email: [email or web address]     PHYSICIAN ADVISORY SERVICES:  FOR [facility] REVIEW - MEDICAL NECESSITY DENIAL  Phone: [phone]  Fax: [phone]  Email: [email or web address]     TYPE OF REQUEST:  Inpatient Status     ADMISSION INFORMATION:  ADMISSION DATE/TIME: 9/17/22  9:03 AM  PATIENT DIAGNOSIS CODE/DESCRIPTION:  Cellulitis [L03 90]  Knee swelling [M25 469]  Prepatellar bursitis of left knee [M70 42]  Acute pain of left knee [M25 562]  DISCHARGE DATE/TIME: 9/18/2022  1:40 PM   IMPORTANT INFORMATION:  Please contact Selwyn Cochran directly with any questions or concerns regarding this request  Department voicemails are confidential     Send requests for admission clinical reviews, concurrent reviews, approvals, and administrative denials due to lack of clinical to fax [phone]             Initial Clinical Review    Admission: Date/Time/Statement:   Admission Orders (From admission, onward)     Ordered        09/17/22 0903  Inpatient Admission  Once            09/16/22 2134  Place in Observation  Once                      Orders Placed This Encounter    Inpatient Admission     Standing Status:   Standing     Number of Occurrences:   1     Order Specific Question:   Level of Care     Answer:   Med Surg [16]     Order Specific Question:   Estimated length of stay     Answer:   More than 2 Midnights     Order Specific Question:   Certification     Answer:   I certify that inpatient services are medically necessary for this patient for a duration of greater than two midnights  See H&P and MD Progress Notes for additional information about the patient's course of treatment  ED Arrival Information     Expected   -    Arrival   9/16/2022 16:50    Acuity   Urgent            Means of arrival   Walk-In    Escorted by   Self    Service   Hospitalist    Admission type   Urgent            Arrival complaint   Fluid in knee            Chief Complaint   Patient presents with    Knee Swelling     Pt reports L knee swelling, original injury was 3 weeks ago, reports last week was getting better and this weekend felt a pop and started to swell again, was told to come to ER to get it drained and for abx     Initial Presentation: 27 y o  male to ER from home,   Reports approx 2-3  wks ago he [place] - no significant injury noted at the time but a week later "twisted" same knee and "felt a pop"   This even was followed by significant swelling of knee:  Seen at Urgent care  9/14,  Xray neg, started on Bactrim 2/2 concern for possible infection  FUP w/Ortho office today who sent pt to ER 2/2  concern for left septic prepatellar bursitis  Has completed 2D  Bactrim, denies fever/chills  Amb w/o difficulty  EXAM:  mild erythema of the left knee and anterior shin to ankle, boggy prominence on anterior patella, and swelling of LLE  Admit OBS Status,   MS Level of care for   Management of  Suspected L septic prepatellar bursitis w/celluitis (erythema extends from knee to ankle)   Will keep NPO,  Initiate  IVF @  125,  IVAB, fup blood culture,  Obtain venous duplex to r/o DVT  (result - NEG)   Consult  Ortho        Date:   9/17     Day 2:   CHANGED to INPATIENT  STATUS,  MS Level of care   Due to  Need for ongoing IVAB's w/serial assessment of  Left knee  9/17  ORTHO  Aspirated left knee at bedside,  Then  Took pt to OR/ general anesthesia   for  I&D: findings indicated septic bursitis  (intraop cultures initiated)    POSTOP:   Routine postop care,  B/l LE scd's;  Daily wgt;  I/O q shift;  Hourly inc spirometry; Ambulate QID  (WBAT LLE)  w/assist - PT/OT evals       -----------------------------------------------------------------------------------------------------------------------      ED Triage Vitals   Temperature Pulse Respirations Blood Pressure SpO2   09/16/22 1658 09/16/22 1658 09/16/22 1658 09/16/22 1658 09/16/22 1658   98 2 °F (36 8 °C) 84 18 119/73 99 %      Temp Source Heart Rate Source Patient Position - Orthostatic VS BP Location FiO2 (%)   09/16/22 1658 09/16/22 1658 09/16/22 1658 09/16/22 1658 --   Oral Monitor Sitting Left arm       Pain Score       09/16/22 2323       1          Wt Readings from Last 1 Encounters:   09/22/22 (!) 142 kg (314 lb)     Additional Vital Signs:   09/17/22 0945 97 7 °F (36 5 °C) 80 16 115/62 -- 98 % None (Room air) WDL --   09/17/22 0930 97 7 °F (36 5 °C) 83 16 113/57 -- 96 % None (Room air) -- --   09/17/22 0925 97 7 °F (36 5 °C) 80 18 115/61 -- 93 % None (Room air) WDL --   09/17/22 0832 98 1 °F (36 7 °C) -- 20 140/80 -- 100 % None (Room air) -- --   09/17/22 07:34:02 97 8 °F (36 6 °C) 80 18 142/82 102 97 % None (Room air) -- Sitting   09/17/22 0515 -- -- -- -- -- 97 % None (Room air) -- --   09/16/22 2323 -- -- -- -- -- -- None (Room air) -- --   09/16/22 23:08:13 97 9 °F (36 6 °C) 81 18 112/69 83 96 % None (Room air) -- Sitting       Pertinent Labs/Diagnostic Test Results:   ekg not available     VAS lower limb venous duplex study, unilateral/limited   Final Result by Samaria Guerin MD (09/17 2349)      9/17   RIGHT LOWER LIMB LIMITED:  Evaluation shows no evidence of thrombus in the common femoral vein   Doppler evaluation shows a normal response to augmentation maneuvers  LEFT LOWER LIMB:  No evidence of acute or chronic deep vein thrombosis  No evidence of superficial thrombophlebitis noted  Doppler evaluation shows a normal response to augmentation maneuvers  Popliteal, posterior tibial and anterior tibial arterial Doppler waveforms are  triphasic    ----------------------------------------------------------------------------------                Results from last 7 days   Lab Units 09/18/22  0618 09/17/22  0513 09/16/22 1923   WBC Thousand/uL 12 71* 6 31 6 56   HEMOGLOBIN g/dL 12 3 12 3 13 1   HEMATOCRIT % 37 7 37 2 39 9   PLATELETS Thousands/uL 270 246 281   NEUTROS ABS Thousands/µL 9 97* 3 64 3 60         Results from last 7 days   Lab Units 09/18/22  0618 09/17/22  0513 09/16/22  1923   SODIUM mmol/L 138 137 140   POTASSIUM mmol/L 4 1 3 9 3 7   CHLORIDE mmol/L 108 106 104   CO2 mmol/L 23 24 28   ANION GAP mmol/L 7 7 8   BUN mg/dL 13 12 15   CREATININE mg/dL 0 90 0 85 1 03   EGFR ml/min/1 73sq m 111 113 94   CALCIUM mg/dL 8 6 8 3* 9 0             Results from last 7 days   Lab Units 09/18/22  0618 09/17/22  0513 09/16/22  1923   GLUCOSE RANDOM mg/dL 217* 98 96     Results from last 7 days   Lab Units 09/17/22  0926 09/17/22  0800 09/16/22  1923   BLOOD CULTURE   --   --  No Growth After 5 Days  No Growth After 5 Days     GRAM STAIN RESULT  No Polys or Bacteria seen No Polys or Bacteria seen  --    WOUND CULTURE  1 colony Staphylococcus aureus*  --   --    BODY FLUID CULTURE, STERILE   --  2+ Growth of Staphylococcus aureus*  --      Results from last 7 days   Lab Units 09/17/22  0801   TOTAL COUNTED  100   NEUTROPHIL % (SYNOVIAL) % 93   WBC FLUID /ul 33,768*   RBC, SYNOVIAL  35,000*   CRYSTALS, SYNOVIAL FLUID  No Crystals Seen       ED Treatment:   Medication Administration from 09/16/2022 1650 to 09/16/2022 2301       Date/Time Order Dose Route Action     09/16/2022 1931 sodium chloride 0 9 % infusion 125 mL/hr Intravenous New Bag     09/16/2022 1936 cefepime (MAXIPIME) IVPB (premix in dextrose) 2,000 mg 50 mL 2,000 mg Intravenous New Bag     09/16/2022 2017 vancomycin (VANCOCIN) 2,000 mg in sodium chloride 0 9 % 500 mL IVPB 2,000 mg Intravenous New Bag        Past Medical History:   Diagnosis Date    Anxiety 07/22/2021    Back pain     See Chiro regularly    Class 2 severe obesity with serious comorbidity and body mass index (BMI) of 37 0 to 37 9 in adult [facility])     History of asthma     Symptomatic when smoking  Last used inhaler in 2011   Hypertriglyceridemia     Hypertriglyceridemia     Other hemorrhoids     Other hyperlipidemia     Vitamin D deficiency      Present on Admission:   Hypertriglyceridemia      Admitting Diagnosis: Cellulitis [L03 90]  Knee swelling [M25 469]  Prepatellar bursitis of left knee [M70 42]  Acute pain of left knee [M25 562]  Age/Sex: 27 y o  male  Admission Orders:  See above note  9/17   Scheduled Medications:  No current facility-administered medications for this encounter  Continuous IV Infusions:  No current facility-administered medications for this encounter  PRN Meds:  No current facility-administered medications for this encounter  IP CONSULT TO ORTHOPEDIC SURGERY    Network Utilization Review Department  ATTENTION: Please call with any questions or concerns to [phone] and carefully listen to the prompts so that you are directed to the right person  All voicemails are confidential   Mayito Falk all requests for admission clinical reviews, approved or denied determinations and any other requests to dedicated fax number below belonging to the campus where the patient is receiving treatment   List of dedicated fax numbers for the Facilities:  [address] DENIALS (Administrative/Medical Necessity) [phone]   [address] (Maternity/NICU/Pediatrics) 261 [facility] 40 125 [facility]  [phone]   [address] [facility] [place] [address] [id number] [place] [address] [address] [phone]

## 2022-10-07 ENCOUNTER — OFFICE VISIT (OUTPATIENT)
Dept: OBGYN CLINIC | Facility: CLINIC | Age: 32
End: 2022-10-07

## 2022-10-07 VITALS — WEIGHT: 314 LBS | BODY MASS INDEX: 38.24 KG/M2 | HEIGHT: 76 IN

## 2022-10-07 DIAGNOSIS — T81.31XD DEHISCENCE OF OPERATIVE WOUND, SUBSEQUENT ENCOUNTER: Primary | ICD-10-CM

## 2022-10-07 DIAGNOSIS — M71.162 SEPTIC PREPATELLAR BURSITIS OF LEFT KNEE: ICD-10-CM

## 2022-10-07 PROCEDURE — 99024 POSTOP FOLLOW-UP VISIT: CPT | Performed by: ORTHOPAEDIC SURGERY

## 2022-10-07 NOTE — PROGRESS NOTES
Assessment/Plan:  Assessment/Plan     1  Dehiscence of operative wound, subsequent encounter     2  Septic prepatellar bursitis of left knee       Patient does have slight superficial dehiscence only at the mid area of the wound  Staples are removed today  There is no evidence of acute infection of recurrent bursitis or septic arthritis  He is given absorbent calcium alginate dressing change daily, gauze Ace wrap  Also instructed to clean the incision daily with alcohol and avoid showering  He will follow up in 1 week for repeat evaluation of the wound  Subjective:   Patient ID: Tariq Mack is a 27 y o  male   HPI    Patient is here today for a follow up evaluation s/p right septic prepatellar bursitis incision and drainage  DOS: 9/17/22  Patient reports some separation of the wound edges at the center of the wound  Some clear drainage from the wound as well  No erythema extending from the periwound  Reports he has not been kneeling on the knee has been trying to minimize the amount of knee flexion he is performing  Denies any constitutional symptom  The following portions of the patient's history were reviewed and updated as appropriate: allergies, current medications, past family history, past social history, past surgical history and problem list     Review of Systems  Review of Systems       Objective:    Ortho Exam   Right  Knee:     Range of motion from 0 to 110  Approximately 2 cm area the middle incision of superficial separation of the dermal layers there is some fibrinous exudate present with granulation tissue visible  There is no purulence  There is no reaccumulation of bursal fluid  There is no erythema extending beyond 1 cm from the periwound  There is no joint effusion    The patient is able to perform a straight leg raise  Calf is soft and nontender  The patient is neurovascular intact distally        Physical Exam

## 2022-10-14 ENCOUNTER — OFFICE VISIT (OUTPATIENT)
Dept: OBGYN CLINIC | Facility: CLINIC | Age: 32
End: 2022-10-14
Payer: COMMERCIAL

## 2022-10-14 VITALS
HEART RATE: 78 BPM | HEIGHT: 76 IN | RESPIRATION RATE: 18 BRPM | WEIGHT: 314 LBS | DIASTOLIC BLOOD PRESSURE: 82 MMHG | SYSTOLIC BLOOD PRESSURE: 136 MMHG | BODY MASS INDEX: 38.24 KG/M2

## 2022-10-14 DIAGNOSIS — T81.31XD DEHISCENCE OF OPERATIVE WOUND, SUBSEQUENT ENCOUNTER: Primary | ICD-10-CM

## 2022-10-14 PROCEDURE — 99212 OFFICE O/P EST SF 10 MIN: CPT | Performed by: ORTHOPAEDIC SURGERY

## 2022-10-14 NOTE — PROGRESS NOTES
Assessment:  1  Dehiscence of operative wound, subsequent encounter       Patient Active Problem List   Diagnosis   • Hypertriglyceridemia   • Class 2 severe obesity with serious comorbidity and body mass index (BMI) of 36 0 to 36 9 in [facility])   • Anxiety   • Other hyperlipidemia   • Vitamin D deficiency   • Acute pain of left knee   • Cellulitis of left lower extremity   • Septic prepatellar bursitis of left knee   • Rupture of operation wound           Plan      Patient is healing the wound  We add calcium alginate on the wound is dry there is some granulation tissue and there the wound is dry at this point  There was some serous drainage on the calcium alginate but not able to express any of this currently  He will come back to see us at 3-4 weeks to make sure it is completely healed  We did talk about work and not to over stress the area not to kneel on as long as it is still healing  Once it is healed he can start kneeling on it  He is to do the dressings while at work until it is completely healed but otherwise he can leave it open to air  Subjective:     Patient ID:    Chief Complaint:Praneeth Oviedo 27 y o  male      HPI    Patient comes in today with regards to his left knee  He had a prepatellar bursa that was infected had surgery and dehisced the wound  He subsequently has been treated with oral antibiotics as well as primary intention healing  He is doing very well      The following portions of the patient's history were reviewed and updated as appropriate: allergies, current medications, past family history, past social history, past surgical history and problem list     All organ systems normal    Social History     Socioeconomic History   • Marital status: Single     Spouse name: Not on file   • Number of children: 1   • Years of education: Not on file   • Highest education level: Not on file   Occupational History   • Occupation: [address]     Employer: [employer]   Tobacco Use   • Smoking status: Former Smoker     Packs/day: 0 50     Years: 22 00     Pack years: 11 00     Types: Cigarettes     Start date: 1999     Quit date: 3/22/2021     Years since quittin 5   • Smokeless tobacco: Former User     Types: Chew     Quit date: 10/1/2020   Vaping Use   • Vaping Use: Former   • Start date: 2013   • Quit date: 3/10/2021   • Substances: Nicotine, CBD, Flavoring   Substance and Sexual Activity   • Alcohol use: Yes     Alcohol/week: 1 0 standard drink     Types: 1 Cans of beer per week   • Drug use: Not Currently     Types: Marijuana     Comment: Last use    • Sexual activity: Yes     Partners: Female     Birth control/protection: OCP   Other Topics Concern   • Not on file   Social History Narrative    Single    Lives with girlfriend Efren Sue and son Yao Norman    1 Child - 1 Son         Social Determinants of Health     Financial Resource Strain: Not on file   Food Insecurity: Not on file   Transportation Needs: Not on file   Physical Activity: Not on file   Stress: Not on file   Social Connections: Not on file   Intimate Partner Violence: Not on file   Housing Stability: Not on file     Past Medical History:   Diagnosis Date   • Anxiety 2021   • Back pain     See Chiro regularly   • Class 2 severe obesity with serious comorbidity and body mass index (BMI) of 37 0 to 37 9 in [facility])    • History of asthma     Symptomatic when smoking  Last used inhaler in   • Hypertriglyceridemia    • Hypertriglyceridemia    • Other hemorrhoids    • Other hyperlipidemia    • Vitamin D deficiency      Past Surgical History:   Procedure Laterality Date   • COLONOSCOPY      +Hemorrhoids   • INCISION AND DRAINAGE OF WOUND Left 2022    Procedure: INCISION AND DRAINAGE (I&D) EXTREMITY prepatedebbie Mckeon;   Surgeon: Goldie Olmstead DO;  Location: [facility];  Service: Orthopedics     Allergies   Allergen Reactions   • Amoxicillin Swelling Swollen taste buds, taste alteration, increased temperature sensitivity     Current Outpatient Medications on File Prior to Visit   Medication Sig Dispense Refill   • cholecalciferol (VITAMIN D3) 400 units tablet Take 400 Units by mouth daily     • ergocalciferol (ERGOCALCIFEROL) 1 25 MG (87895 UT) capsule Take 1 capsule (50,000 Units total) by mouth once a week for 12 doses 12 capsule 0   • ibuprofen (MOTRIN) 800 mg tablet Take 1 tablet (800 mg total) by mouth every 6 (six) hours as needed for mild pain (Take as needed with meals or snack) 30 tablet 0   • Multiple Vitamin (MULTIVITAMIN) tablet Take 1 tablet by mouth daily      • niacin 500 mg tablet Take 500 mg by mouth 2 (two) times a day with meals     • omega-3-acid ethyl esters (LOVAZA) 1 g capsule Take 2 capsules (2 g total) by mouth 2 (two) times a day 120 capsule 1     No current facility-administered medications on file prior to visit  Objective:        Ortho Exam  the wound is approximately 8 mm in size medial to lateral and approximately 1 cm in length  The depth is less than 5 mm  There is granulation tissue in the wound bed there is mild alex-incisional erythema but this is not warm to palpation and it is not the bright red that would be seen with infection this is a normal healing erythema  Very minimal fluctuance            Portions of the record may have been created with voice recognition software   Occasional wrong word or "sound a like" substitutions may have occurred due to the inherent limitations of voice recognition software   Read the chart carefully and recognize, using context, where substitutions have occurred

## 2022-11-04 ENCOUNTER — OFFICE VISIT (OUTPATIENT)
Dept: OBGYN CLINIC | Facility: CLINIC | Age: 32
End: 2022-11-04

## 2022-11-04 VITALS — HEIGHT: 76 IN | WEIGHT: 314 LBS | BODY MASS INDEX: 38.24 KG/M2

## 2022-11-04 DIAGNOSIS — T81.31XD DEHISCENCE OF OPERATIVE WOUND, SUBSEQUENT ENCOUNTER: Primary | ICD-10-CM

## 2022-11-04 NOTE — PROGRESS NOTES
Assessment:   Status Post  INCISION AND DRAINAGE (I&D) EXTREMITY prepatella brusa - Left on 9/17/2022 ; surgical wound dehiscence improving without evidence of reinfection    Plan:   Weight bearing:  As tolerated  Advised patient still dressed the wound while at work and with kneeling try to avoid kneeling until the wound is completely healed as much as possible  Explained to the patient that due to his job and inability to avoid deep flexion and kneeling on the knee that this is the reason why this wound has taken so long to heal  The wound is continuing to heal and  there is no purulence no significant prepatellar bursal fluid collection exam no evidence of active infection  He was given some more calcium alginate dressing    Follow Up:    he was instructed to let us know if the wound does not continue to heal or if he has any sign of recurrent infection such as increased drainage or erythema  If the wound does not completely heal in 4-6 weeks he will follow up  CHIEF COMPLAINT:  Chief Complaint   Patient presents with   • Left Knee - Post-op         SUBJECTIVE:  Zachary Ndiaye is a 27y o  year old male who presents for follow up after [address] [address] [place] [address] (I&D) EXTREMITY prepatella brusa - Left  Today patient has intermittent mild clear drainage from the wound  He notes some sensation of tearing at the knee sometimes when kneeling and flexing the knee at work  Denies any redness or warmth    Pt denies any fevers or chills  Denies any numbness or tingling        PHYSICAL EXAMINATION:    MUSCULOSKELETAL EXAMINATION:  Left knee  General: Alert and oriented x 3, WNWD, NAD  Incision: Clean, dry, intact, healing well except 1 cm region at the mid aspect of the wound there is some granulation tissue present in the wound bed this is not yet fully healed no expressible drainage but wound edges  No significant prepatellar bursal fluid collection  No alex-incisional erythema  no drainage or purulence  Range of Motion: As expected  Neurovascular status: Sensation intact to light touch L1-S1   Motor intact L1-S1  Pulses:  Intact        STUDIES REVIEWED:  None      PROCEDURES PERFORMED:  Procedures     none

## 2022-11-07 ENCOUNTER — LAB (OUTPATIENT)
Dept: LAB | Facility: CLINIC | Age: 32
End: 2022-11-07

## 2022-11-07 ENCOUNTER — TELEPHONE (OUTPATIENT)
Dept: FAMILY MEDICINE CLINIC | Facility: CLINIC | Age: 32
End: 2022-11-07

## 2022-11-07 DIAGNOSIS — Z77.011 LEAD EXPOSURE: ICD-10-CM

## 2022-11-07 DIAGNOSIS — E55.9 VITAMIN D DEFICIENCY: ICD-10-CM

## 2022-11-07 LAB — 25(OH)D3 SERPL-MCNC: 18.5 NG/ML (ref 30–100)

## 2022-11-07 NOTE — TELEPHONE ENCOUNTER
Please schedule overdue appt:    Return in about 4 months (around 1/22/2022) for Physical / 4mo - Hyperlipidemia, Hypertriglyceridemia, Vitamin D Deficiency, Obesity, Labs  Labs done 11/7/22

## 2022-11-08 LAB — LEAD BLD-MCNC: <1 UG/DL (ref 0–3.4)

## 2023-03-07 ENCOUNTER — OFFICE VISIT (OUTPATIENT)
Dept: FAMILY MEDICINE CLINIC | Facility: CLINIC | Age: 33
End: 2023-03-07

## 2023-03-07 VITALS
WEIGHT: 315 LBS | HEART RATE: 80 BPM | SYSTOLIC BLOOD PRESSURE: 128 MMHG | DIASTOLIC BLOOD PRESSURE: 80 MMHG | OXYGEN SATURATION: 97 % | BODY MASS INDEX: 38.36 KG/M2 | TEMPERATURE: 97 F | HEIGHT: 76 IN | RESPIRATION RATE: 18 BRPM

## 2023-03-07 DIAGNOSIS — E66.01 SEVERE OBESITY (BMI 35.0-39.9) WITH COMORBIDITY (HCC): ICD-10-CM

## 2023-03-07 DIAGNOSIS — R06.81 APNEA: ICD-10-CM

## 2023-03-07 DIAGNOSIS — E66.9 OBESITY (BMI 35.0-39.9 WITHOUT COMORBIDITY): ICD-10-CM

## 2023-03-07 DIAGNOSIS — Z00.00 ANNUAL PHYSICAL EXAM: Primary | ICD-10-CM

## 2023-03-07 DIAGNOSIS — E78.1 HYPERTRIGLYCERIDEMIA: ICD-10-CM

## 2023-03-07 DIAGNOSIS — F41.9 ANXIETY: ICD-10-CM

## 2023-03-07 DIAGNOSIS — E55.9 VITAMIN D DEFICIENCY: ICD-10-CM

## 2023-03-07 DIAGNOSIS — R53.83 OTHER FATIGUE: ICD-10-CM

## 2023-03-07 DIAGNOSIS — Z13.6 SCREENING FOR CARDIOVASCULAR CONDITION: ICD-10-CM

## 2023-03-07 DIAGNOSIS — Z23 ENCOUNTER FOR IMMUNIZATION: ICD-10-CM

## 2023-03-07 DIAGNOSIS — R06.83 SNORING: ICD-10-CM

## 2023-03-07 DIAGNOSIS — E78.49 OTHER HYPERLIPIDEMIA: ICD-10-CM

## 2023-03-07 DIAGNOSIS — E66.01 CLASS 2 SEVERE OBESITY DUE TO EXCESS CALORIES WITH SERIOUS COMORBIDITY AND BODY MASS INDEX (BMI) OF 39.0 TO 39.9 IN ADULT (HCC): ICD-10-CM

## 2023-03-07 PROBLEM — M71.162 SEPTIC PREPATELLAR BURSITIS OF LEFT KNEE: Status: RESOLVED | Noted: 2022-09-22 | Resolved: 2023-03-07

## 2023-03-07 PROBLEM — T81.31XA RUPTURE OF OPERATION WOUND: Status: RESOLVED | Noted: 2022-09-22 | Resolved: 2023-03-07

## 2023-03-07 PROBLEM — L03.116 CELLULITIS OF LEFT LOWER EXTREMITY: Status: RESOLVED | Noted: 2022-09-16 | Resolved: 2023-03-07

## 2023-03-07 PROBLEM — M25.562 ACUTE PAIN OF LEFT KNEE: Status: RESOLVED | Noted: 2022-09-16 | Resolved: 2023-03-07

## 2023-03-07 RX ORDER — AMOXICILLIN 500 MG
2 CAPSULE ORAL 2 TIMES DAILY
COMMUNITY

## 2023-03-07 NOTE — PATIENT INSTRUCTIONS
To Do:     Call for appt - Management per Cardio Dr Evangelina Peters - next appt 6/22 - Overdue  Contact info: Phone: #[phone]  Address: [address] [place] [address],[address], [address] [phone] [facility] org/lab  Wellness Visit for Adults   AMBULATORY CARE:   A wellness visit  is when you see your healthcare provider to get screened for health problems  Your healthcare provider will also give you advice on how to stay healthy  Write down your questions so you remember to ask them  Ask your healthcare provider how often you should have a wellness visit  What happens at a wellness visit:  Your healthcare provider will ask about your health, and your family history of health problems  This includes high blood pressure, heart disease, and cancer  He or she will ask if you have symptoms that concern you, if you smoke, and about your mood  You may also be asked about your intake of medicines, supplements, food, and alcohol  Any of the following may be done: Your weight  will be checked  Your height may also be checked so your body mass index (BMI) can be calculated  Your BMI shows if you are at a healthy weight  Your blood pressure  and heart rate will be checked  Your temperature may also be checked  Blood and urine tests  may be done  Blood tests may be done to check your cholesterol levels  Abnormal cholesterol levels increase your risk for heart disease and stroke  You may also need a blood or urine test to check for diabetes if you are at increased risk  Urine tests may be done to look for signs of an infection or kidney disease  A physical exam  includes checking your heartbeat and lungs with a stethoscope  Your healthcare provider may also check your skin to look for sun damage  Screening tests  may be recommended  A screening test is done to check for diseases that may not cause symptoms  The screening tests you may need depend on your age, gender, family history, and lifestyle habits   For example, colorectal screening may be recommended if you are 48years old or older  Screening tests you need if you are a woman:   A Pap smear  is used to screen for cervical cancer  Pap smears are usually done every 3 to 5 years depending on your age  You may need them more often if you have had abnormal Pap smear test results in the past  Ask your healthcare provider how often you should have a Pap smear  A mammogram  is an x-ray of your breasts to screen for breast cancer  Experts recommend mammograms every 2 years starting at age 48 years  You may need a mammogram at age 52 years or younger if you have an increased risk for breast cancer  Talk to your healthcare provider about when you should start having mammograms and how often you need them  Vaccines you may need:   Get an influenza vaccine  every year  The influenza vaccine protects you from the flu  Several types of viruses cause the flu  The viruses change over time, so new vaccines are made each year  Get a tetanus-diphtheria (Td) booster vaccine  every 10 years  This vaccine protects you against tetanus and diphtheria  Tetanus is a severe infection that may cause painful muscle spasms and lockjaw  Diphtheria is a severe bacterial infection that causes a thick covering in the back of your mouth and throat  Get a human papillomavirus (HPV) vaccine  if you are female and aged 23 to 32 or male 23 to 24 and never received it  This vaccine protects you from HPV infection  HPV is the most common infection spread by sexual contact  HPV may also cause vaginal, penile, and anal cancers  Get a pneumococcal vaccine  if you are aged 72 years or older  The pneumococcal vaccine is an injection given to protect you from pneumococcal disease  Pneumococcal disease is an infection caused by pneumococcal bacteria  The infection may cause pneumonia, meningitis, or an ear infection      Get a shingles vaccine  if you are 60 or older, even if you have had shingles before  The shingles vaccine is an injection to protect you from the varicella-zoster virus  This is the same virus that causes chickenpox  Shingles is a painful rash that develops in people who had chickenpox or have been exposed to the virus  How to eat healthy:  My Plate is a model for planning healthy meals  It shows the types and amounts of foods that should go on your plate  Fruits and vegetables make up about half of your plate, and grains and protein make up the other half  A serving of dairy is included on the side of your plate  The amount of calories and serving sizes you need depends on your age, gender, weight, and height  Examples of healthy foods are listed below:  Eat a variety of vegetables  such as dark green, red, and orange vegetables  You can also include canned vegetables low in sodium (salt) and frozen vegetables without added butter or sauces  Eat a variety of fresh fruits , canned fruit in 100% juice, frozen fruit, and dried fruit  Include whole grains  At least half of the grains you eat should be whole grains  Examples include whole-wheat bread, wheat pasta, brown rice, and whole-grain cereals such as oatmeal     Eat a variety of protein foods such as seafood (fish and shellfish), lean meat, and poultry without skin (turkey and chicken)  Examples of lean meats include pork leg, shoulder, or tenderloin, and beef round, sirloin, tenderloin, and extra lean ground beef  Other protein foods include eggs and egg substitutes, beans, peas, soy products, nuts, and seeds  Choose low-fat dairy products such as skim or 1% milk or low-fat yogurt, cheese, and cottage cheese  Limit unhealthy fats  such as butter, hard margarine, and shortening  Exercise:  Exercise at least 30 minutes per day on most days of the week  Some examples of exercise include walking, biking, dancing, and swimming   You can also fit in more physical activity by taking the stairs instead of the elevator or parking farther away from stores  Include muscle strengthening activities 2 days each week  Regular exercise provides many health benefits  It helps you manage your weight, and decreases your risk for type 2 diabetes, heart disease, stroke, and high blood pressure  Exercise can also help improve your mood  Ask your healthcare provider about the best exercise plan for you  General health and safety guidelines:   Do not smoke  Nicotine and other chemicals in cigarettes and cigars can cause lung damage  Ask your healthcare provider for information if you currently smoke and need help to quit  E-cigarettes or smokeless tobacco still contain nicotine  Talk to your healthcare provider before you use these products  Limit alcohol  A drink of alcohol is 12 ounces of beer, 5 ounces of wine, or 1½ ounces of liquor  Lose weight, if needed  Being overweight increases your risk of certain health conditions  These include heart disease, high blood pressure, type 2 diabetes, and certain types of cancer  Protect your skin  Do not sunbathe or use tanning beds  Use sunscreen with a SPF 15 or higher  Apply sunscreen at least 15 minutes before you go outside  Reapply sunscreen every 2 hours  Wear protective clothing, hats, and sunglasses when you are outside  Drive safely  Always wear your seatbelt  Make sure everyone in your car wears a seatbelt  A seatbelt can save your life if you are in an accident  Do not use your cell phone when you are driving  This could distract you and cause an accident  Pull over if you need to make a call or send a text message  Practice safe sex  Use latex condoms if are sexually active and have more than one partner  Your healthcare provider may recommend screening tests for sexually transmitted infections (STIs)  Wear helmets, lifejackets, and protective gear    Always wear a helmet when you ride a bike or motorcycle, go skiing, or play sports that could cause a head injury  Wear protective equipment when you play sports  Wear a lifejacket when you are on a boat or doing water sports  © Copyright Mitch Bar 2022 Information is for End User's use only and may not be sold, redistributed or otherwise used for commercial purposes  The above information is an  only  It is not intended as medical advice for individual conditions or treatments  Talk to your doctor, nurse or pharmacist before following any medical regimen to see if it is safe and effective for you  Obesity   AMBULATORY CARE:   Obesity  means your body mass index (BMI) is greater than 30  Your healthcare provider will use your age, height, and weight to measure your BMI  The risks of obesity include  many health problems, including injuries or physical disability  Diabetes (high blood sugar level)    High blood pressure or high cholesterol    Heart disease    Stroke    Gallbladder or liver disease    Cancer of the colon, breast, prostate, liver, or kidney    Sleep apnea    Arthritis or gout    Screening  is done to check for health conditions before you have signs or symptoms  If you are 28to 79years old, your blood sugar level may be checked every 3 years for signs of prediabetes or diabetes  Your healthcare provider will check your blood pressure at each visit  High blood pressure can lead to a stroke or other problems  Your provider may check for signs of heart disease, cancer, or other health problems  Seek care immediately if:   You have a severe headache, confusion, or difficulty speaking  You have weakness on one side of your body  You have chest pain, sweating, or shortness of breath  Call your doctor if:   You have symptoms of gallbladder or liver disease, such as pain in your upper abdomen  You have knee or hip pain and discomfort while walking  You have symptoms of diabetes, such as intense hunger and thirst, and frequent urination      You have symptoms of sleep apnea, such as snoring or daytime sleepiness  You have questions or concerns about your condition or care  Treatment for obesity  focuses on helping you lose weight to improve your health  Even a small decrease in BMI can reduce the risk for many health problems  Your healthcare provider will help you set a weight-loss goal   Lifestyle changes  are the first step in treating obesity  These include making healthy food choices and getting regular physical activity  Your healthcare provider may suggest a weight-loss program that involves coaching, education, and therapy  Medicine  may help you lose weight when it is used with a healthy foods and physical activity  Surgery  can help you lose weight if you have obesity along with other health problems  Several types of weight-loss surgery are available  Ask your healthcare provider for more information  Tips for safe weight loss:   Set small, realistic goals  An example of a small goal is to walk for 20 minutes 5 days a week  Anther goal is to lose 5% of your body weight  Ask for support  Tell friends, family members, and coworkers about your goals  Ask someone to lose weight with you  You may also want to join a weight-loss support group  Identify foods or triggers that may cause you to overeat  Remove tempting high-calorie foods from your home and workplace  Place a bowl of fresh fruit on your kitchen counter  If stress causes you to eat, find other ways to cope with stress  A counselor or therapist may be able to help you  Track your daily calories and activity  Write down what you eat and drink  Also write down how many minutes of physical activity you do each day  Track your weekly weight  Weigh yourself in the morning, before you eat or drink anything but after you use the bathroom  Use the same scale, in the same place, and in similar clothing each time  Only weigh yourself 1 to 2 times each week, or as directed   You may become discouraged if you weigh yourself every day  Eating changes: You will need to eat 500 to 1,000 fewer calories each day than you currently eat to lose 1 to 2 pounds a week  The following changes will help you cut calories:  Eat smaller portions  Use small plates, no larger than 9 inches in diameter  Fill your plate half full of fruits and vegetables  Measure your food using measuring cups until you know what a serving size looks like  Eat 3 meals and 1 or 2 snacks each day  Plan your meals in advance  Jenelle Foster and eat at home most of the time  Eat slowly  Do not skip meals  Skipping meals can lead to overeating later in the day  This can make it harder for you to lose weight  Talk with a dietitian to help you make a meal plan and schedule that is right for you  Eat fruits and vegetables at every meal   They are low in calories and high in fiber, which makes you feel full  Do not add butter, margarine, or cream sauce to vegetables  Use herbs to season steamed vegetables  Eat less fat and fewer fried foods  Eat more baked or grilled chicken and fish  These protein sources are lower in calories and fat than red meat  Limit fast food  Dress your salads with olive oil and vinegar instead of bottled dressing  Limit the amount of sugar you eat  Do not drink sugary beverages  Limit alcohol  Activity changes:  Physical activity is good for your body in many ways  It helps you burn calories and build strong muscles  It decreases stress and depression, and improves your mood  It can also help you sleep better  Talk to your healthcare provider before you begin an exercise program   Exercise for at least 30 minutes 5 days a week  Start slowly  Set aside time each day for physical activity that you enjoy and that is convenient for you  It is best to do both weight training and an activity that increases your heart rate, such as walking, bicycling, or swimming  Find ways to be more active    Do yard work and housecleaning  Walk up the stairs instead of using elevators  Spend your leisure time going to events that require walking, such as outdoor festivals or fairs  This extra physical activity can help you lose weight and keep it off  Follow up with your doctor as directed: You may need to meet with a dietitian  Write down your questions so you remember to ask them during your visits  © Copyright Mikey Verduzco 2022 Information is for End User's use only and may not be sold, redistributed or otherwise used for commercial purposes  The above information is an  only  It is not intended as medical advice for individual conditions or treatments  Talk to your doctor, nurse or pharmacist before following any medical regimen to see if it is safe and effective for you  Cholesterol and Your Health   AMBULATORY CARE:   Cholesterol  is a waxy, fat-like substance  Your body uses cholesterol to make hormones and new cells, and to protect nerves  Cholesterol is made by your body  It also comes from certain foods you eat, such as meat and dairy products  Your healthcare provider can help you set goals for your cholesterol levels  He or she can help you create a plan to meet your goals  Cholesterol level goals: Your cholesterol level goals depend on your risk for heart disease, your age, and your other health conditions  The following are general guidelines: Total cholesterol  includes low-density lipoprotein (LDL), high-density lipoprotein (HDL), and triglyceride levels  The total cholesterol level should be lower than 200 mg/dL and is best at about 150 mg/dL  LDL cholesterol  is called bad cholesterol  because it forms plaque in your arteries  As plaque builds up, your arteries become narrow, and less blood flows through  When plaque decreases blood flow to your heart, you may have chest pain  If plaque completely blocks an artery that brings blood to your heart, you may have a heart attack  Plaque can break off and form blood clots  Blood clots may block arteries in your brain and cause a stroke  The level should be less than 130 mg/dL and is best at about 100 mg/dL  HDL cholesterol  is called good cholesterol  because it helps remove LDL cholesterol from your arteries  It does this by attaching to LDL cholesterol and carrying it to your liver  Your liver breaks down LDL cholesterol so your body can get rid of it  High levels of HDL cholesterol can help prevent a heart attack and stroke  Low levels of HDL cholesterol can increase your risk for heart disease, heart attack, and stroke  The level should be 60 mg/dL or higher  Triglycerides  are a type of fat that store energy from foods you eat  High levels of triglycerides also cause plaque buildup  This can increase your risk for a heart attack or stroke  If your triglyceride level is high, your LDL cholesterol level may also be high  The level should be less than 150 mg/dL  Any of the following can increase your risk for high cholesterol:   Smoking cigarettes    Being overweight or obese, or not getting enough exercise    Drinking large amounts of alcohol    A medical condition such as hypertension (high blood pressure) or diabetes    Certain genes passed from your parents to you    Age older than 65 years    What you need to know about having your cholesterol levels checked: Adults 21to 39years of age should have their cholesterol levels checked every 4 to 6 years  Adults 45 years or older should have their cholesterol checked every 1 to 2 years  You may need your cholesterol checked more often, or at a younger age, if you have risk factors for heart disease  You may also need to have your cholesterol checked more often if you have other health conditions, such as diabetes  Blood tests are used to check cholesterol levels  Blood tests measure your levels of triglycerides, LDL cholesterol, and HDL cholesterol    How healthy fats affect your cholesterol levels:  Healthy fats, also called unsaturated fats, help lower LDL cholesterol and triglyceride levels  Healthy fats include the following:  Monounsaturated fats  are found in foods such as olive oil, canola oil, avocado, nuts, and olives  Polyunsaturated fats,  such as omega 3 fats, are found in fish, such as salmon, trout, and tuna  They can also be found in plant foods such as flaxseed, walnuts, and soybeans  How unhealthy fats affect your cholesterol levels:  Unhealthy fats increase LDL cholesterol and triglyceride levels  They are found in foods high in cholesterol, saturated fat, and trans fat:  Cholesterol  is found in eggs, dairy, and meat  Saturated fat  is found in butter, cheese, ice cream, whole milk, and coconut oil  Saturated fat is also found in meat, such as sausage, hot dogs, and bologna  Trans fat  is found in liquid oils and is used in fried and baked foods  Foods that contain trans fats include chips, crackers, muffins, sweet rolls, microwave popcorn, and cookies  Treatment  for high cholesterol will also decrease your risk of heart disease, heart attack, and stroke  Treatment may include any of the following:  Lifestyle changes  may include food, exercise, weight loss, and quitting smoking  You may also need to decrease the amount of alcohol you drink  Your healthcare provider will want you to start with lifestyle changes  Other treatment may be added if lifestyle changes are not enough  Your healthcare provider may recommend you work with a team to manage hyperlipidemia  The team may include medical experts such as a dietitian, an exercise or physical therapist, and a behavior therapist  Your family members may be included in helping you create lifestyle changes  Medicines  may be given to lower your LDL cholesterol, triglyceride levels, or total cholesterol level   You may need medicines to lower your cholesterol if any of the following is true:    You have a history of stroke, TIA, unstable angina, or a heart attack  Your LDL cholesterol level is 190 mg/dL or higher  You are age 36 to 76 years, have diabetes or heart disease risk factors, and your LDL cholesterol is 70 mg/dL or higher  Supplements  include fish oil, red yeast rice, and garlic  Fish oil may help lower your triglyceride and LDL cholesterol levels  It may also increase your HDL cholesterol level  Red yeast rice may help decrease your total cholesterol level and LDL cholesterol level  Garlic may help lower your total cholesterol level  Do not take any supplements without talking to your healthcare provider  Food changes you can make to lower your cholesterol levels:  A dietitian can help you create a healthy eating plan  He or she can show you how to read food labels and choose foods low in saturated fat, trans fats, and cholesterol  Decrease the total amount of fat you eat  Choose lean meats, fat-free or 1% fat milk, and low-fat dairy products, such as yogurt and cheese  Try to limit or avoid red meats  Limit or do not eat fried foods or baked goods, such as cookies  Replace unhealthy fats with healthy fats  Cook foods in olive oil or canola oil  Choose soft margarines that are low in saturated fat and trans fat  Seeds, nuts, and avocados are other examples of healthy fats  Eat foods with omega-3 fats  Examples include salmon, tuna, mackerel, walnuts, and flaxseed  Eat fish 2 times per week  Pregnant women should not eat fish that have high levels of mercury, such as shark, swordfish, and lyndon mackerel  Increase the amount of high-fiber foods you eat  High-fiber foods can help lower your LDL cholesterol  Aim to get between 20 and 30 grams of fiber each day  Fruits and vegetables are high in fiber  Eat at least 5 servings each day  Other high-fiber foods are whole-grain or whole-wheat breads, pastas, or cereals, and brown rice  Eat 3 ounces of whole-grain foods each day  Increase fiber slowly  You may have abdominal discomfort, bloating, and gas if you add fiber to your diet too quickly  Eat healthy protein foods  Examples include low-fat dairy products, skinless chicken and turkey, fish, and nuts  Limit foods and drinks that are high in sugar  Your dietitian or healthcare provider can help you create daily limits for high-sugar foods and drinks  The limit may be lower if you have diabetes or another health condition  Limits can also help you lose weight if needed  Lifestyle changes you can make to lower your cholesterol levels:   Maintain a healthy weight  Ask your healthcare provider what a healthy weight is for you  Ask him or her to help you create a weight loss plan if needed  Weight loss can decrease your total cholesterol and triglyceride levels  Weight loss may also help keep your blood pressure at a healthy level  Be physically active throughout the day  Physical activity, such as exercise, can help lower your total cholesterol level and maintain a healthy weight  Physical activity can also help increase your HDL cholesterol level  Work with your healthcare provider to create an program that is right for you  Get at least 30 to 40 minutes of moderate physical activity most days of the week  Examples of exercise include brisk walking, swimming, or biking  Also include strength training at least 2 times each week  Your healthcare providers can help you create a physical activity plan  Do not smoke  Nicotine and other chemicals in cigarettes and cigars can raise your cholesterol levels  Ask your healthcare provider for information if you currently smoke and need help to quit  E-cigarettes or smokeless tobacco still contain nicotine  Talk to your healthcare provider before you use these products  Limit or do not drink alcohol  Alcohol can increase your triglyceride levels  Ask your healthcare provider before you drink alcohol   Ask how much is okay for you to drink in 24 hours or 1 week  Follow up with your doctor as directed:  Write down your questions so you remember to ask them during your visits  © Copyright Carlito Terrell 2022 Information is for End User's use only and may not be sold, redistributed or otherwise used for commercial purposes  The above information is an  only  It is not intended as medical advice for individual conditions or treatments  Talk to your doctor, nurse or pharmacist before following any medical regimen to see if it is safe and effective for you

## 2023-03-07 NOTE — PROGRESS NOTES
Assessment/Plan:  Problem List Items Addressed This Visit        Other    Hypertriglyceridemia     Pending labs  Management per Cardio - overdue for appt  Recommend lifestyle modifications  Relevant Orders    Comprehensive metabolic panel    Lipid panel    TSH, 3rd generation with Free T4 reflex    LDL cholesterol, direct    Class 2 severe obesity with serious comorbidity and body mass index (BMI) of 39 0 to 39 9 in adult [facility])     Worsening  Recommend lifestyle modifications  Relevant Orders    TSH, 3rd generation with Free T4 reflex    Vitamin D 25 hydroxy    Anxiety       Stable  Patient declines mood medications and counseling  Smart phone elinor list and counseling list provided previously  Relevant Orders    TSH, 3rd generation with Free T4 reflex    Other hyperlipidemia     Pending labs  Management per Cardio - overdue for appt  Previously stable s statin  Recommend lifestyle modifications  Relevant Orders    CBC and differential    Comprehensive metabolic panel    Lipid panel    TSH, 3rd generation with Free T4 reflex    LDL cholesterol, direct    Vitamin D deficiency     Pending labs  Continue MVI and OTC Vitamin D 5,000IU daily             Relevant Orders    Comprehensive metabolic panel    Vitamin D 25 hydroxy   Other Visit Diagnoses     Annual physical exam    -  Primary    Other fatigue        Relevant Orders    Home Study    CBC and differential    TSH, 3rd generation with Free T4 reflex    Snoring        Relevant Orders    Home Study    Apnea        Relevant Orders    Home Study    Screening for cardiovascular condition        Relevant Orders    CBC and differential    Comprehensive metabolic panel    Lipid panel    LDL cholesterol, direct    Encounter for immunization        Relevant Orders    TDAP VACCINE GREATER THAN OR EQUAL TO 8YO IM (Completed)    Obesity (BMI 35 0-39 9 without comorbidity)        Relevant Orders    TSH, 3rd generation with Free T4 reflex Severe obesity (BMI 35 0-39  9) with comorbidity ([address] )        Relevant Orders    TSH, 3rd generation with Free T4 reflex    BMI 39 0-39 9,adult        Relevant Orders    TSH, 3rd generation with Free T4 reflex           Return in 1 year (on 3/7/2024) for Physical - EUSEBIA?, HL, HyperTG, Vitamin D Def, Obesity, Labs  Future Appointments   Date Time Provider Nora Moody   3/8/2024  8:00 AM Jade Linares, DO [facility]        Subjective:     Elvi Linn is a 27 y o  male who presents today for a follow-up on his chronic medical conditions  HPI:  Chief Complaint   Patient presents with   • Physical Exam     Physical/4mo - Hyperlipidemia, Hypertriglyceridemia, Vitamin D Deficiency, Obesity, Labs  No new problems or concerns  • HM     Declines covid vaccines and flu shot  Last Tdap is unknown  -- Above per clinical staff and reviewed  --      HPI      Today:         Return in about 4 months (around 1/22/2022) for 4mo - Hyperlipidemia, Hypertriglyceridemia, Vitamin D Deficiency, Obesity, Labs  4mo OV - Overdue    He has been forgetting to take 1 dose of some of his supplements for the past 2 weeks  Fatigue - Due to inadequate sleep  +Snoring, witnessed apnea  He has taken home sleep study that was inconclusive, I/P sleep study was in 2023 - stable  He has gained 100lb in the interim  [address]         Obesity - Watching diet  No regular exercise         Hyperlipidemia - No statin previously        Hypertriglyceridemia - Management per Cardio Dr Cecelia Puga - next appt 6/22 - Overdue  Cardio advised D/C Fish Oil 1200mg 2 tabs BID, and start Vasecpa, both of which patient declined  Refuses to start lower dose Fenofibrate  He is taking Niacin 500mg BID AMA  No other Rx previously        Anxiety - Feels like he's coping well   Has increased work stress - working 20 hours per day, sleeping 2-3 hours per night  He feels irritable    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, but worries about losing his health insurance in the process in the future   Stressed financially, also caring for young son and daughter  Xiomara Pepe safe at home  [facility] SI/HI/AH/VH  [facility] counseling 2018 - helpful   No mood meds previously   "I will not even attempt to be on medications for mood  "       PHQ-2/9 Depression Screening    Little interest or pleasure in doing things: 2 - more than half the days  Feeling down, depressed, or hopeless: 0 - not at all  Trouble falling or staying asleep, or sleeping too much: 0 - not at all  Feeling tired or having little energy: 2 - more than half the days  Poor appetite or overeatin - not at all  Feeling bad about yourself - or that you are a failure or have let yourself or your family down: 0 - not at all  Trouble concentrating on things, such as reading the newspaper or watching television: 0 - not at all  Moving or speaking so slowly that other people could have noticed  Or the opposite - being so fidgety or restless that you have been moving around a lot more than usual: 1 - several days  Thoughts that you would be better off dead, or of hurting yourself in some way: 0 - not at all  PHQ-2 Score: 2  PHQ-2 Interpretation: Negative depression screen  PHQ-9 Score: 5   PHQ-9 Interpretation: Mild depression          BRUNO-7 Flowsheet Screening    Flowsheet Row Most Recent Value   Over the last 2 weeks, how often have you been bothered by any of the following problems? Feeling nervous, anxious, or on edge 1   Not being able to stop or control worrying 0   Worrying too much about different things 0   Trouble relaxing 0   Being so restless that it is hard to sit still 0   Becoming easily annoyed or irritable 1   Feeling afraid as if something awful might happen 0   BRUNO-7 Total Score 2           Vitamin D Deficiency - s/p Drisdol  Taking MVI and OTC Vitamin D 5,000IU daily           Back Pain - Sees Chiro PRN - next appt 3/8/23      Hemorrhoids - s/p Colonoscopy in 2016 in [place], [place] ([address] name unknown)   Patient states repeat colonoscopy due at age 35yo  Stable c fiber           Reviewed:  Labs 11/7/22, Cardio 12/3/21     Overdue for Dentist   Overdue for Optho         Last Tetanus vaccine at [address]  - 7 in the last 10 years - [id number]              The following portions of the patient's history were reviewed and updated as appropriate: allergies, current medications, past family history, past medical history, past social history, past surgical history and problem list       Review of Systems   Constitutional: Positive for fatigue  Negative for appetite change, chills, diaphoresis and fever  Respiratory: Positive for chest tightness (Improved when he gets adquate sleep)  Negative for shortness of breath  Cardiovascular: Negative for chest pain  Gastrointestinal: Negative for abdominal pain, blood in stool, diarrhea, nausea and vomiting  Genitourinary: Negative for dysuria  Musculoskeletal: Positive for back pain  Current Outpatient Medications   Medication Sig Dispense Refill   • Cholecalciferol (Vitamin D3) 125 MCG (5000 UT) CAPS Take 1 capsule by mouth in the morning     • Lactobacillus (PROBIOTIC ACIDOPHILUS PO) Take 1 capsule by mouth in the morning     • Multiple Vitamin (MULTIVITAMIN) tablet Take 1 tablet by mouth daily      • niacin 500 mg tablet Take 500 mg by mouth 2 (two) times a day with meals     • Omega-3 Fatty Acids (Fish Oil) 1200 MG CAPS Take 2 capsules by mouth 2 (two) times a day     • psyllium (METAMUCIL SMOOTH TEXTURE) 28 % packet Take 1 packet by mouth 2 (two) times a day       No current facility-administered medications for this visit         Objective:  /80   Pulse 80   Temp (!) 97 °F (36 1 °C)   Resp 18   Ht 6' 4" (1 93 m)   Wt (!) 146 kg (322 lb)   SpO2 97%   BMI 39 20 kg/m²    Wt Readings from Last 3 Encounters:   03/07/23 (!) 146 kg (322 lb)   11/04/22 (!) 142 kg (314 lb)   10/14/22 (!) 142 kg (314 lb)      BP Readings from Last 3 Encounters:   03/07/23 128/80   10/14/22 136/82   09/22/22 124/84          Physical Exam  Vitals and nursing note reviewed  Constitutional:       Appearance: Normal appearance  He is well-developed  He is obese  HENT:      Head: Normocephalic and atraumatic  Right Ear: Tympanic membrane, ear canal and external ear normal       Left Ear: Tympanic membrane, ear canal and external ear normal       Nose: Nose normal       Right Sinus: No maxillary sinus tenderness or frontal sinus tenderness  Left Sinus: No maxillary sinus tenderness or frontal sinus tenderness  Mouth/Throat:      Mouth: Mucous membranes are moist       Pharynx: Uvula midline  Tonsils: No tonsillar exudate  Eyes:      Extraocular Movements: Extraocular movements intact  Conjunctiva/sclera: Conjunctivae normal       Pupils: Pupils are equal, round, and reactive to light  Cardiovascular:      Rate and Rhythm: Normal rate and regular rhythm  Pulses: Normal pulses  Heart sounds: Normal heart sounds  Pulmonary:      Effort: Pulmonary effort is normal       Breath sounds: Normal breath sounds  Abdominal:      General: Bowel sounds are normal  There is no distension  Palpations: Abdomen is soft  There is no mass  Tenderness: There is no abdominal tenderness  There is no guarding or rebound  Musculoskeletal:         General: No swelling or tenderness  Cervical back: Neck supple  Right lower leg: No edema  Left lower leg: No edema  Lymphadenopathy:      Cervical: No cervical adenopathy  Skin:     Findings: No rash  Neurological:      General: No focal deficit present  Mental Status: He is alert and oriented to person, place, and time  Psychiatric:         Mood and Affect: Mood normal          Behavior: Behavior normal          Thought Content:  Thought content normal          Judgment: Judgment normal  Lab Results:      Lab Results   Component Value Date    WBC 12 71 (H) 09/18/2022    HGB 12 3 09/18/2022    HCT 37 7 09/18/2022     09/18/2022    TRIG 1,997 (H) 11/18/2021    HDL 31 (L) 09/22/2021    LDLDIRECT 62 09/22/2021    ALT 56 11/18/2021    AST 37 11/18/2021    K 4 1 09/18/2022     09/18/2022    CREATININE 0 90 09/18/2022    BUN 13 09/18/2022    CO2 23 09/18/2022    GLUF 98 09/17/2022    HGBA1C 5 5 09/22/2021     Lab Results   Component Value Date    URICACID 6 9 09/16/2022     Invalid input(s): BASENAME Vitamin D    No results found  POCT Labs      BMI Counseling: Body mass index is 39 2 kg/m²  The BMI is above normal  Nutrition recommendations include encouraging healthy choices of fruits and vegetables  Exercise recommendations include exercising 3-5 times per week  No pharmacotherapy was ordered  Rationale for BMI follow-up plan is due to patient being overweight or obese  Depression Screening and Follow-up Plan: Patient was screened for depression during today's encounter  They screened negative with a PHQ-2 score of 2

## 2023-03-08 NOTE — ASSESSMENT & PLAN NOTE
Pending labs  Management per Cardio - overdue for appt  Previously stable s statin  Recommend lifestyle modifications

## 2023-03-08 NOTE — PROGRESS NOTES
Assessment/Plan:  Problem List Items Addressed This Visit        Other    Hypertriglyceridemia     Pending labs  Management per Cardio - overdue for appt  Recommend lifestyle modifications  Relevant Orders    Comprehensive metabolic panel    Lipid panel    TSH, 3rd generation with Free T4 reflex    LDL cholesterol, direct    Class 2 severe obesity with serious comorbidity and body mass index (BMI) of 39 0 to 39 9 in adult [facility])     Worsening  Recommend lifestyle modifications  Relevant Orders    TSH, 3rd generation with Free T4 reflex    Vitamin D 25 hydroxy    Anxiety       Stable  Patient declines mood medications and counseling  Smart phone elinor list and counseling list provided previously  Relevant Orders    TSH, 3rd generation with Free T4 reflex    Other hyperlipidemia     Pending labs  Management per Cardio - overdue for appt  Previously stable s statin  Recommend lifestyle modifications  Relevant Orders    CBC and differential    Comprehensive metabolic panel    Lipid panel    TSH, 3rd generation with Free T4 reflex    LDL cholesterol, direct    Vitamin D deficiency     Pending labs  Continue MVI and OTC Vitamin D 5,000IU daily             Relevant Orders    Comprehensive metabolic panel    Vitamin D 25 hydroxy   Other Visit Diagnoses     Annual physical exam    -  Primary    Other fatigue        Relevant Orders    Home Study    CBC and differential    TSH, 3rd generation with Free T4 reflex    Snoring        Relevant Orders    Home Study    Apnea        Relevant Orders    Home Study    Screening for cardiovascular condition        Relevant Orders    CBC and differential    Comprehensive metabolic panel    Lipid panel    LDL cholesterol, direct    Encounter for immunization        Relevant Orders    TDAP VACCINE GREATER THAN OR EQUAL TO 8YO IM (Completed)    Obesity (BMI 35 0-39 9 without comorbidity)        Relevant Orders    TSH, 3rd generation with Free T4 reflex Severe obesity (BMI 35 0-39  9) with comorbidity ([address] )        Relevant Orders    TSH, 3rd generation with Free T4 reflex    BMI 39 0-39 9,adult        Relevant Orders    TSH, 3rd generation with Free T4 reflex           Return in 1 year (on 3/7/2024) for Physical - EUSEBIA?, HL, HyperTG, Vitamin D Def, Obesity, Labs  Future Appointments   Date Time Provider Nora Moody   3/8/2024  8:00 AM Aditi Carbajal,  [facility]        Subjective:     Florentino Porras is a 27 y o  male who presents today for a follow-up on his chronic medical conditions  HPI:  Chief Complaint   Patient presents with   • Physical Exam     Physical/4mo - Hyperlipidemia, Hypertriglyceridemia, Vitamin D Deficiency, Obesity, Labs  No new problems or concerns  • HM     Declines covid vaccines and flu shot  Last Tdap is unknown  -- Above per clinical staff and reviewed  --      HPI      Today:      Physical    Watching diet  No exercise  Overdue for Dentist   Overdue for Optho  Health Maintenance   Topic Date Due   • COVID-19 Vaccine (1) 06/06/2023 (Originally 1990)   • Influenza Vaccine (1) 06/30/2023 (Originally 9/1/2022)   • Depression Screening  03/07/2024   • BMI: Followup Plan  03/07/2024   • BMI: Adult  03/07/2024   • Annual Physical  03/07/2024   • DTaP,Tdap,and Td Vaccines (2 - Td or Tdap) 03/07/2033   • HIV Screening  Completed   • Hepatitis C Screening  Completed   • Pneumococcal Vaccine: Pediatrics (0 to 5 Years) and At-Risk Patients (6 to 59 Years)  Aged Out   • HIB Vaccine  Aged Out   • IPV Vaccine  Aged Out   • Hepatitis A Vaccine  Aged Out   • Meningococcal ACWY Vaccine  Aged Out   • HPV Vaccine  Aged Out         The following portions of the patient's history were reviewed and updated as appropriate: allergies, current medications, past family history, past medical history, past social history, past surgical history and problem list       Review of Systems     See other note          Current Outpatient Medications   Medication Sig Dispense Refill   • Cholecalciferol (Vitamin D3) 125 MCG (5000 UT) CAPS Take 1 capsule by mouth in the morning     • Lactobacillus (PROBIOTIC ACIDOPHILUS PO) Take 1 capsule by mouth in the morning     • Multiple Vitamin (MULTIVITAMIN) tablet Take 1 tablet by mouth daily      • niacin 500 mg tablet Take 500 mg by mouth 2 (two) times a day with meals     • Omega-3 Fatty Acids (Fish Oil) 1200 MG CAPS Take 2 capsules by mouth 2 (two) times a day     • psyllium (METAMUCIL SMOOTH TEXTURE) 28 % packet Take 1 packet by mouth 2 (two) times a day       No current facility-administered medications for this visit  Objective:  /80   Pulse 80   Temp (!) 97 °F (36 1 °C)   Resp 18   Ht 6' 4" (1 93 m)   Wt (!) 146 kg (322 lb)   SpO2 97%   BMI 39 20 kg/m²    Wt Readings from Last 3 Encounters:   03/07/23 (!) 146 kg (322 lb)   11/04/22 (!) 142 kg (314 lb)   10/14/22 (!) 142 kg (314 lb)      BP Readings from Last 3 Encounters:   03/07/23 128/80   10/14/22 136/82   09/22/22 124/84          Physical Exam     Vitals and nursing note reviewed  Constitutional:       Appearance: Normal appearance  He is well-developed  He is obese  HENT:      Head: Normocephalic and atraumatic  Right Ear: Tympanic membrane, ear canal and external ear normal       Left Ear: Tympanic membrane, ear canal and external ear normal       Nose: Nose normal       Right Sinus: No maxillary sinus tenderness or frontal sinus tenderness  Left Sinus: No maxillary sinus tenderness or frontal sinus tenderness  Mouth/Throat:      Mouth: Mucous membranes are moist       Pharynx: Uvula midline  Tonsils: No tonsillar exudate  Eyes:      Extraocular Movements: Extraocular movements intact  Conjunctiva/sclera: Conjunctivae normal       Pupils: Pupils are equal, round, and reactive to light  Cardiovascular:      Rate and Rhythm: Normal rate and regular rhythm  Pulses: Normal pulses  Heart sounds: Normal heart sounds  Pulmonary:      Effort: Pulmonary effort is normal       Breath sounds: Normal breath sounds  Abdominal:      General: Bowel sounds are normal  There is no distension  Palpations: Abdomen is soft  There is no mass  Tenderness: There is no abdominal tenderness  There is no guarding or rebound  Musculoskeletal:         General: No swelling or tenderness  Cervical back: Neck supple  Right lower leg: No edema  Left lower leg: No edema  Lymphadenopathy:      Cervical: No cervical adenopathy  Skin:     Findings: No rash  Neurological:      General: No focal deficit present  Mental Status: He is alert and oriented to person, place, and time  Psychiatric:         Mood and Affect: Mood normal          Behavior: Behavior normal          Thought Content: Thought content normal          Judgment: Judgment normal      Lab Results:      Lab Results   Component Value Date    WBC 12 71 (H) 09/18/2022    HGB 12 3 09/18/2022    HCT 37 7 09/18/2022     09/18/2022    TRIG 1,997 (H) 11/18/2021    HDL 31 (L) 09/22/2021    LDLDIRECT 62 09/22/2021    ALT 56 11/18/2021    AST 37 11/18/2021    K 4 1 09/18/2022     09/18/2022    CREATININE 0 90 09/18/2022    BUN 13 09/18/2022    CO2 23 09/18/2022    GLUF 98 09/17/2022    HGBA1C 5 5 09/22/2021     Lab Results   Component Value Date    URICACID 6 9 09/16/2022     Invalid input(s): BASENAME Vitamin D    No results found       POCT Labs

## 2023-03-13 ENCOUNTER — LAB (OUTPATIENT)
Dept: LAB | Facility: CLINIC | Age: 33
End: 2023-03-13

## 2023-03-13 DIAGNOSIS — F41.9 ANXIETY: ICD-10-CM

## 2023-03-13 DIAGNOSIS — Z13.6 SCREENING FOR CARDIOVASCULAR CONDITION: ICD-10-CM

## 2023-03-13 DIAGNOSIS — E66.01 CLASS 2 SEVERE OBESITY DUE TO EXCESS CALORIES WITH SERIOUS COMORBIDITY AND BODY MASS INDEX (BMI) OF 39.0 TO 39.9 IN ADULT (HCC): ICD-10-CM

## 2023-03-13 DIAGNOSIS — E55.9 VITAMIN D DEFICIENCY: ICD-10-CM

## 2023-03-13 DIAGNOSIS — E78.1 HYPERTRIGLYCERIDEMIA: ICD-10-CM

## 2023-03-13 DIAGNOSIS — R53.83 OTHER FATIGUE: ICD-10-CM

## 2023-03-13 DIAGNOSIS — E66.9 OBESITY (BMI 35.0-39.9 WITHOUT COMORBIDITY): ICD-10-CM

## 2023-03-13 DIAGNOSIS — E78.49 OTHER HYPERLIPIDEMIA: ICD-10-CM

## 2023-03-13 DIAGNOSIS — E66.01 SEVERE OBESITY (BMI 35.0-39.9) WITH COMORBIDITY (HCC): ICD-10-CM

## 2023-03-13 LAB
25(OH)D3 SERPL-MCNC: 25.7 NG/ML (ref 30–100)
ALBUMIN SERPL BCP-MCNC: 4.4 G/DL (ref 3.5–5)
ALP SERPL-CCNC: 56 U/L (ref 34–104)
ALT SERPL W P-5'-P-CCNC: 38 U/L (ref 7–52)
ANION GAP SERPL CALCULATED.3IONS-SCNC: 7 MMOL/L (ref 4–13)
AST SERPL W P-5'-P-CCNC: 22 U/L (ref 13–39)
BASOPHILS # BLD AUTO: 0.03 THOUSANDS/ÂΜL (ref 0–0.1)
BASOPHILS NFR BLD AUTO: 1 % (ref 0–1)
BILIRUB SERPL-MCNC: 1.12 MG/DL (ref 0.2–1)
BUN SERPL-MCNC: 12 MG/DL (ref 5–25)
CALCIUM SERPL-MCNC: 9.1 MG/DL (ref 8.4–10.2)
CHLORIDE SERPL-SCNC: 104 MMOL/L (ref 96–108)
CHOLEST SERPL-MCNC: 213 MG/DL
CO2 SERPL-SCNC: 27 MMOL/L (ref 21–32)
CREAT SERPL-MCNC: 0.9 MG/DL (ref 0.6–1.3)
EOSINOPHIL # BLD AUTO: 0.33 THOUSAND/ÂΜL (ref 0–0.61)
EOSINOPHIL NFR BLD AUTO: 7 % (ref 0–6)
ERYTHROCYTE [DISTWIDTH] IN BLOOD BY AUTOMATED COUNT: 12.2 % (ref 11.6–15.1)
GFR SERPL CREATININE-BSD FRML MDRD: 111 ML/MIN/1.73SQ M
GLUCOSE P FAST SERPL-MCNC: 90 MG/DL (ref 65–99)
HCT VFR BLD AUTO: 43.5 % (ref 36.5–49.3)
HDLC SERPL-MCNC: 37 MG/DL
HGB BLD-MCNC: 14.5 G/DL (ref 12–17)
IMM GRANULOCYTES # BLD AUTO: 0.02 THOUSAND/UL (ref 0–0.2)
IMM GRANULOCYTES NFR BLD AUTO: 0 % (ref 0–2)
LDLC SERPL DIRECT ASSAY-MCNC: 66 MG/DL (ref 0–100)
LYMPHOCYTES # BLD AUTO: 1.37 THOUSANDS/ÂΜL (ref 0.6–4.47)
LYMPHOCYTES NFR BLD AUTO: 31 % (ref 14–44)
MCH RBC QN AUTO: 30 PG (ref 26.8–34.3)
MCHC RBC AUTO-ENTMCNC: 33.3 G/DL (ref 31.4–37.4)
MCV RBC AUTO: 90 FL (ref 82–98)
MONOCYTES # BLD AUTO: 0.41 THOUSAND/ÂΜL (ref 0.17–1.22)
MONOCYTES NFR BLD AUTO: 9 % (ref 4–12)
NEUTROPHILS # BLD AUTO: 2.29 THOUSANDS/ÂΜL (ref 1.85–7.62)
NEUTS SEG NFR BLD AUTO: 52 % (ref 43–75)
NONHDLC SERPL-MCNC: 176 MG/DL
NRBC BLD AUTO-RTO: 0 /100 WBCS
PLATELET # BLD AUTO: 228 THOUSANDS/UL (ref 149–390)
PMV BLD AUTO: 9.6 FL (ref 8.9–12.7)
POTASSIUM SERPL-SCNC: 4.6 MMOL/L (ref 3.5–5.3)
PROT SERPL-MCNC: 7.7 G/DL (ref 6.4–8.4)
RBC # BLD AUTO: 4.84 MILLION/UL (ref 3.88–5.62)
SODIUM SERPL-SCNC: 138 MMOL/L (ref 135–147)
TRIGL SERPL-MCNC: 620 MG/DL
TSH SERPL DL<=0.05 MIU/L-ACNC: 2.44 UIU/ML (ref 0.45–4.5)
WBC # BLD AUTO: 4.45 THOUSAND/UL (ref 4.31–10.16)

## 2023-03-13 NOTE — RESULT ENCOUNTER NOTE
Hyperlipidemia - Elevated total and low HDL (good) cholesterol  Recommend lifestyle modifications - low fat, low cholesterol     Elevated Triglycerides - Uncontrolled, but improved  Advise follow-up appointment with Cardio Dr Landen Reyes as previously discussed  Other resulted labs are stable  Message sent to patient via [employer] patient portal     Nurse to also call patient

## 2023-03-14 NOTE — RESULT ENCOUNTER NOTE
Low vitamin D - Recommend start multivitamin and over-the-counter vitamin D3 10,000 International Units daily      Message sent to patient via [employer] patient portal

## 2023-04-07 ENCOUNTER — TELEPHONE (OUTPATIENT)
Dept: SLEEP CENTER | Facility: CLINIC | Age: 33
End: 2023-04-07

## 2023-04-07 NOTE — TELEPHONE ENCOUNTER
----- Message from Smooth Lugo DO sent at 4/5/2023  1:21 PM EDT -----  approved  ----- Message -----  From: Mello Alberto  Sent: 4/5/2023  11:59 AM EDT  To: Sleep Medicine Dolores Nava Provider    This Home sleep study needs approval      If approved please sign and return to clerical pool  If denied please include reasons why  Also provide alternative testing if warranted  Please sign and return to clerical pool

## 2023-05-10 ENCOUNTER — HOSPITAL ENCOUNTER (OUTPATIENT)
Dept: SLEEP CENTER | Facility: CLINIC | Age: 33
Discharge: HOME | End: 2023-05-10

## 2023-05-10 DIAGNOSIS — R53.83 OTHER FATIGUE: ICD-10-CM

## 2023-05-10 DIAGNOSIS — R06.81 APNEA: ICD-10-CM

## 2023-05-10 DIAGNOSIS — R06.83 SNORING: ICD-10-CM

## 2023-05-13 NOTE — PROGRESS NOTES
Home Sleep Study Documentation    HOME STUDY DEVICE: Noxturnal no                                           Lina G3 yes      Pre-Sleep Home Study:    Set-up and instructions performed by: Rizwana Adame 61    Technician performed demonstration for Patient: yes    Return demonstration performed by Patient: yes    Written instructions provided to Patient: yes    Patient signed consent form: yes        Post-Sleep Home Study:    Additional comments by Patient:     Home Sleep Study Failed:no:    Failure reason: N/A    Reported or Detected: N/A    Scored by: Radha Hopkins

## 2023-05-15 DIAGNOSIS — F41.9 ANXIETY: ICD-10-CM

## 2023-05-15 DIAGNOSIS — G47.33 OSA (OBSTRUCTIVE SLEEP APNEA): Primary | ICD-10-CM

## 2023-05-15 DIAGNOSIS — R06.83 SNORING: ICD-10-CM

## 2023-05-15 DIAGNOSIS — R06.81 APNEA: ICD-10-CM

## 2023-05-15 DIAGNOSIS — R53.83 OTHER FATIGUE: ICD-10-CM

## 2023-05-15 DIAGNOSIS — E66.01 CLASS 2 SEVERE OBESITY DUE TO EXCESS CALORIES WITH SERIOUS COMORBIDITY AND BODY MASS INDEX (BMI) OF 39.0 TO 39.9 IN ADULT (HCC): ICD-10-CM

## 2023-05-15 NOTE — RESULT ENCOUNTER NOTE
Home sleep study shows mild obstructive sleep apnea  There is normal baseline oxygenation with mild decreased oxygen related events  Sleep medicine team is recommending treatment for sleep apnea if patient has uncontrolled mood disorder, high blood pressure, asthma, or excessive daytime sleepiness  If so, nurse to see referral for sleep medicine  Message sent to patient via [employer] patient portal     Nurse to also call patient

## 2023-05-19 ENCOUNTER — TELEPHONE (OUTPATIENT)
Dept: FAMILY MEDICINE CLINIC | Facility: CLINIC | Age: 33
End: 2023-05-19

## 2023-05-19 NOTE — TELEPHONE ENCOUNTER
Pt returned phone call regarding sleep study  Relayed result note from [address]  Pt verbalized understanding  Denies any sx at this time, but will notify us if sx present

## 2023-05-23 ENCOUNTER — TELEPHONE (OUTPATIENT)
Dept: SLEEP CENTER | Facility: CLINIC | Age: 33
End: 2023-05-23

## 2023-05-23 NOTE — TELEPHONE ENCOUNTER
Call placed to patient  Left message home sleep study is resulted and to call the nursing staff to review the results and the provider's recommendations  Study shows mild EUSEBIA  Per study order, patient to consult sleep medicine

## 2023-06-12 ENCOUNTER — TELEPHONE (OUTPATIENT)
Dept: FAMILY MEDICINE CLINIC | Facility: CLINIC | Age: 33
End: 2023-06-12

## 2023-06-12 NOTE — TELEPHONE ENCOUNTER
Reviewed forms, which should be completed by sleep medicine, as they are the ones who diagnosed the sleep apnea and would be treating the condition  MyChart message sent to pt to make him aware  Form placed in in-progress folder for now until we hear back from pt

## 2023-06-12 NOTE — TELEPHONE ENCOUNTER
Provider:Dr Bety Olivares     Name of form:Occupational Medicine     From placed:Dr Coreas's clinical folder    Form to be faxed (fax #), mailed (address), or picked up (by whom):please call patient when form is ready     Patient was made aware of the 7-10 business day form policy

## 2023-06-13 NOTE — TELEPHONE ENCOUNTER
Pt read [facility] message but did not respond  Closing out this message

## 2023-07-17 ENCOUNTER — OFFICE VISIT (OUTPATIENT)
Age: 33
End: 2023-07-17
Payer: COMMERCIAL

## 2023-07-17 ENCOUNTER — TELEPHONE (OUTPATIENT)
Dept: SLEEP CENTER | Facility: CLINIC | Age: 33
End: 2023-07-17

## 2023-07-17 VITALS
HEIGHT: 76 IN | WEIGHT: 315 LBS | HEART RATE: 77 BPM | SYSTOLIC BLOOD PRESSURE: 122 MMHG | OXYGEN SATURATION: 97 % | DIASTOLIC BLOOD PRESSURE: 80 MMHG | TEMPERATURE: 98.6 F | BODY MASS INDEX: 38.36 KG/M2

## 2023-07-17 DIAGNOSIS — G47.26 SHIFTING SLEEP-WORK SCHEDULE, AFFECTING SLEEP: ICD-10-CM

## 2023-07-17 DIAGNOSIS — G47.33 OSA (OBSTRUCTIVE SLEEP APNEA): Primary | ICD-10-CM

## 2023-07-17 PROCEDURE — 99203 OFFICE O/P NEW LOW 30 MIN: CPT | Performed by: INTERNAL MEDICINE

## 2023-07-17 NOTE — TELEPHONE ENCOUNTER
Rx for CPAP and clinical information sent to [address] [place] [address] via [employer]

## 2023-07-17 NOTE — PROGRESS NOTES
Sleep Consultation   Samaria Awad 26 y.o. male MRN: [id number]      Reason for consultation: To evaluate for sleep apnea; weight gain, snoring     Requesting physician: Dr. Jerald Odonnell    Assessment/Plan    Patient is a 25 yo M with mild obstructive sleep apnea based on HST completed 5/12/2023. In view of excessive daytime fatigue, will initiate therapy with autotitrating CPAP 4/20. Will see the patient back in follow up in three months for compliance check or sooner on prn basis. 1. EUSEBIA (obstructive sleep apnea)  2. Apnea  - CPAP Auto New DME     Return in about 3 months (around 10/17/2023). History of Present Illness   HPI:  Samaria Awad is a 26 y.o. male with PMHx including HLD, obesity, vitamin D deficiency. Patient presents for evaluation of snoring, weight gain possible EUSEBIA. Reason for visit: Patient reports he gained 60lb over the last year or so, has excessive daytime fatigue, and snores. 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 During the week he sleeps 7PM-10PM, on weekends he sleeps from [address] [place] [address],[address]  What time do you fall asleep? Under 5 minutes  Nighttime awakenings: no  What time do you get out of bed? 10PM   What wakes you up? n/a  Are you sleepy during the day? If so when are you the most sleepy? Yes, mid day  Do you deliberately try to nap? Yes if has the opportunity  What time do you usually nap?  Random hours   Do you feel refreshed after a nap? no  How often do you nap? 1-3x per week  Do you or have you used medication to help you sleep? no  Do you take medication to stay awake? no      If yes, name and dose of medication and time over which it was last used: n/a  Are you aware or have you been told that while sleeping you snore loadly, choke, gag, snort, gasp, stop breathing? Yes snores heavily      If yes for how long? Snoring for many years     Have you ever used CPAP or BiPAP or Oxygen? no  If yes what pressure are you on and how long have you been using? N/A    Have you ever experienced:  Headache no  Teeth grinding no  Head injury no  Chronic pain no  Inability to move the arms or legs upon awakening no  Weakness in knees with laughter or strong emotion no  Visual hallucinations when falling asleep or waking up no  Growing pains no  An uncomfortable feeling in legs  no  Inability to keep legs still no  Thrashing or kicking during sleep no  Eating during the night no  Heartburn during sleep no  Sour or bitter tast upon awakening no  Sleep walking/talking no  Nightmares no  Acting out dreams  no  Aggressive behavior no  Involuntary shaking of the body no  Tongue biting no  Bed wetting no  Poor short term memory no  Decreased concentration no  Feelings of depression no  Anxiety no     Do you drink any caffeinated beverages:  Coffee: 32 oz/day  Soda:  8oz/day  Tea: 0 oz/day  Chocolate:  none  Energy drinks: 32 oz/day  Decaff coffee: 0 oz/day    Have you ever used tobacco? yes     If you no longer smoke, how long ago did you quit? Quit 2021  Do you currently or did you ever drink alcohol? no     If yes, what type, how much and how often? n/a  Do you currently or have you ever used recreational drugs? no       Historical Information   Past Medical History:   Diagnosis Date   • Anxiety 07/22/2021   • Back pain     See Chiro regularly   • Class 2 severe obesity with serious comorbidity and body mass index (BMI) of 37.0 to 37.9 in [facility])    • History of asthma     Symptomatic when smoking. Last used inhaler in 2011.    • Hypertriglyceridemia    • EUSEBIA (obstructive sleep apnea) 05/15/2023    Mild   • Other hemorrhoids    • Other hyperlipidemia    • Vitamin D deficiency Past Surgical History:   Procedure Laterality Date   • COLONOSCOPY  2016    +Hemorrhoids   • INCISION AND DRAINAGE OF WOUND Left 2022    Procedure: INCISION AND DRAINAGE (I&D) EXTREMITY prepatella Isabella Sunitha; Surgeon: Angelica Antunez DO;  Location: [facility];  Service: Orthopedics     Family History   Problem Relation Age of Onset   • Hyperlipidemia Mother    • Restless legs syndrome Mother    • No Known Problems Father    • ADD / ADHD Brother    • No Known Problems Son      Social History     Socioeconomic History   • Marital status: Single     Spouse name: Not on file   • Number of children: 1   • Years of education: Not on file   • Highest education level: Not on file   Occupational History   • Occupation:      Employer: [address] [place] [address]   Tobacco Use   • Smoking status: Former     Packs/day: 0.50     Years: 22.00     Total pack years: 11.00     Types: Cigarettes     Start date: 1999     Quit date: 3/22/2021     Years since quittin.3   • Smokeless tobacco: Former     Types: Chew     Quit date: 10/1/2020   Vaping Use   • Vaping Use: Former   • Start date: 2013   • Quit date: 3/10/2021   • Substances: Nicotine, CBD, Flavoring   Substance and Sexual Activity   • Alcohol use:  Yes     Alcohol/week: 1.0 standard drink of alcohol     Types: 1 Cans of beer per week   • Drug use: Not Currently     Types: Marijuana     Comment: Last use    • Sexual activity: Yes     Partners: Female     Birth control/protection: OCP   Other Topics Concern   • Not on file   Social History Narrative    Single    Lives with girlfriend Devorah Barrios and son Neri Lambert    1 Child - 1 Son         Social Determinants of Health     Financial Resource Strain: Not on file   Food Insecurity: Not on file   Transportation Needs: Not on file   Physical Activity: Not on file   Stress: Not on file   Social Connections: Not on file   Intimate Partner Violence: Not on file   Housing Stability: Not on file Meds/Allergies   Allergies   Allergen Reactions   • Amoxicillin Swelling     Swollen taste buds, taste alteration, increased temperature sensitivity       Home medications:  Prior to Admission medications    Medication Sig Start Date End Date Taking? Authorizing Provider   Cholecalciferol (Vitamin D3) 125 MCG (5000 UT) CAPS Take 1 capsule by mouth in the morning    Historical Provider, MD   Lactobacillus (PROBIOTIC ACIDOPHILUS PO) Take 1 capsule by mouth in the morning    Historical Provider, MD   Multiple Vitamin (MULTIVITAMIN) tablet Take 1 tablet by mouth daily     Historical Provider, MD   niacin 500 mg tablet Take 500 mg by mouth 2 (two) times a day with meals    Historical Provider, MD   Omega-3 Fatty Acids (Fish Oil) 1200 MG CAPS Take 2 capsules by mouth 2 (two) times a day    Historical Provider, MD   psyllium (METAMUCIL SMOOTH TEXTURE) 28 % packet Take 1 packet by mouth 2 (two) times a day    Historical Provider, MD       Vitals:   Blood pressure 122/80, pulse 77, temperature 98.6 °F (37 °C), height 6' 4" (1.93 m), weight (!) 147 kg (323 lb), SpO2 97 %. , Body mass index is 39.32 kg/m². Neck Circumference: 18 (in)    Physical Exam:  General: Sitting in chair, awake alert and oriented to person, place, and time. No acute distress  HEENT: PERRL, Nares patent, no craniofacial abnormalities. Mucous membranes, moist, no oral lesions, and normal dentition. Mallampati class 4, tonsils 2  NECK: Neck circumference 18in, Trachea midline, no accessory muscle use, and no stridor   CARDIAC: Regular rate and rhythm  PULM: CTA bilaterally no wheezing, rhonchi or rales.  No conversational dyspnea  EXT: No cyanosis, no clubbing, and no peripheral edema    NEURO: No focal neurologic deficits, moving all extremities appropriately    Labs: CBC: No results found for: "WBC", "HGB", "HCT", "MCV", "PLT", "ADJUSTEDWBC", "RBC", "MCH", "MCHC", "RDW", "MPV", "NRBC"  Lab Results   Component Value Date    WBC 4.45 03/13/2023    HGB 14.5 03/13/2023    HCT 43.5 03/13/2023    MCV 90 03/13/2023     03/13/2023      Lab Results   Component Value Date    CALCIUM 9.1 03/13/2023    K 4.6 03/13/2023    CO2 27 03/13/2023     03/13/2023    BUN 12 03/13/2023    CREATININE 0.90 03/13/2023     No results found for: "IRON", "TIBC", "FERRITIN"  No results found for: "Dois Prude"  No results found for: "FOLATE"       Sleep studies:  HST completed 5/12/2023    IMPRESSION:  1. Mild obstructive sleep apnea  2. Normal baseline oxygenation with mild event related desaturations     RECOMMENDATION:  If the patient has uncontrolled mood disorder, hypertension, asthma, or excessive daytime sleepiness, treatment of mild EUSEBIA may be warranted. Treatment options include auto titrating CPAP, oral appliance therapy, weight loss. Sleep medicine referral can be considered.   Clinical correlation recommended.                                        Christene Habermann   11:40 AM   [facility] Sleep Fellow

## 2023-07-18 LAB
DME PARACHUTE DELIVERY DATE EXPECTED: NORMAL
DME PARACHUTE DELIVERY DATE REQUESTED: NORMAL
DME PARACHUTE DELIVERY NOTE: NORMAL
DME PARACHUTE ITEM DESCRIPTION: NORMAL
DME PARACHUTE ORDER STATUS: NORMAL
DME PARACHUTE SUPPLIER NAME: NORMAL
DME PARACHUTE SUPPLIER PHONE: NORMAL

## 2023-07-19 NOTE — ASSESSMENT & PLAN NOTE
He is working 2 jobs. His regular job as a  is overnight from 11 PM to 7:30 AM.  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 Therefore he is averaging only 3 to 4 hours of sleep during the weekdays. He completely understands his situation and how sleep deprivation is affecting him. He is trying to hire employees to help run his business.

## 2023-07-19 NOTE — ASSESSMENT & PLAN NOTE
Excessive daytime sleepiness is likely contributed by current work hours of working 2 jobs at the same time. This is causing sleep deprivation where he only averages 3 to 4 hours/day of sleep during the weekdays. However, with excessive daytime sleepiness, with a diagnosis of mild obstructive sleep apnea on HST with NIDIA 10.9, we can initiate a trial of CPAP. I discussed with him that CPAP may not be able to completely solve his excessive daytime sleepiness without him extending his daily sleep time. He understands this completely and is trying to hire employees to offload his work schedule during the week. Auto titrating CPAP ordered 4-20 cm H2O with full facemask. We will assess compliance in 1 to 3 months.

## 2023-07-31 ENCOUNTER — TELEPHONE (OUTPATIENT)
Dept: SLEEP CENTER | Facility: CLINIC | Age: 33
End: 2023-07-31

## 2023-07-31 LAB
DME PARACHUTE DELIVERY DATE ACTUAL: NORMAL
DME PARACHUTE DELIVERY DATE EXPECTED: NORMAL
DME PARACHUTE DELIVERY DATE REQUESTED: NORMAL
DME PARACHUTE DELIVERY NOTE: NORMAL
DME PARACHUTE ITEM DESCRIPTION: NORMAL
DME PARACHUTE ORDER STATUS: NORMAL
DME PARACHUTE SUPPLIER NAME: NORMAL
DME PARACHUTE SUPPLIER PHONE: NORMAL

## 2023-07-31 NOTE — TELEPHONE ENCOUNTER
I set this pt. up today in [facility]. on a ResMed S11 set at 4-20cm and gave the F20 (L) mask.

## 2023-09-25 ENCOUNTER — OFFICE VISIT (OUTPATIENT)
Age: 33
End: 2023-09-25
Payer: COMMERCIAL

## 2023-09-25 VITALS
TEMPERATURE: 98.5 F | BODY MASS INDEX: 38.36 KG/M2 | OXYGEN SATURATION: 98 % | WEIGHT: 315 LBS | SYSTOLIC BLOOD PRESSURE: 138 MMHG | HEART RATE: 72 BPM | HEIGHT: 76 IN | DIASTOLIC BLOOD PRESSURE: 90 MMHG

## 2023-09-25 DIAGNOSIS — G47.26 SHIFTING SLEEP-WORK SCHEDULE, AFFECTING SLEEP: ICD-10-CM

## 2023-09-25 DIAGNOSIS — E66.01 CLASS 2 SEVERE OBESITY WITH SERIOUS COMORBIDITY AND BODY MASS INDEX (BMI) OF 39.0 TO 39.9 IN ADULT, UNSPECIFIED OBESITY TYPE: ICD-10-CM

## 2023-09-25 DIAGNOSIS — G47.33 OSA (OBSTRUCTIVE SLEEP APNEA): Primary | ICD-10-CM

## 2023-09-25 PROCEDURE — 99213 OFFICE O/P EST LOW 20 MIN: CPT | Performed by: INTERNAL MEDICINE

## 2023-09-25 NOTE — PROGRESS NOTES
Progress Note - Sleep Medicine  Samia Unger 26 y.o. male MRN: [id number]       Impression & Plan:   Patient is a 27 yo M with PMH including HLD, obesity, Vitamin D deficiency who presents for follow up of mild obstructive sleep apnea. HST completed 5/2023 demonstrated NIDIA 10.9. Patient is utilizing ResMed AirSense 11 AutoSet at 4-20cm H20. Compliance data reviewed today demonstrating fair utilization 97% however only 50% utilization >=4 hours. This is due to his busy work schedule, as he works two jobs and sleeps approximately 4 hours a day. Discussed importance of using CPAP for >= 4 hours per day, and he states he is trying to cut down on his work hours. Of note, he carries a CDL license and reports he needs paperwork to be filled out, but does not have this paperwork with him today. He will drop it off at the . 1. EUSEBIA (obstructive sleep apnea)  Continue with CPAP at present settings   Call if experiencing any problems with CPAP  Will follow up in one year or sooner on prn basis     2. 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 Class 2 severe obesity with serious comorbidity and body mass index (BMI) of 39.0 to 39.9 in adult, unspecified obesity type [facility])  Discussed importance of regular exercise, eating a prudent diet to maintain long term weight goals. Return in about 1 year (around 9/25/2024). ______________________________________________________________________    HPI:    Samia Unger is a 26 y.o. male with PMH including HLD, obesity, Vitamin D deficiency. He presents today for follow up of mild obstructive sleep apnea. Patient reports he continues to work 110 hours per week, still getting approximately 3.5 hours of sleep per night due to work schedule.  He is a diesal  at night and is also working for a [employer] during the day. The amount of sleep he is getting has increased since last visit, he is trying to get at least 4 hours of sleep per night. He is trying to use the CPAP every night but admits to missing some nights. He sleeps from 5PM-7PM and sleeps until 11-11:30PM because he works two different jobs. Admits to some irritability but this has improved since he has started CPAP. He utilizes a fullface mask.      Patient reported issues with the following symptoms:   Mask leaking: He is using full face mask and reports the masks leaks intermittently   Skin irritation from the mask: denies  Pain or discomfort: denies   Feeling of claustrophobia: denies  Aerophagia: denies  Pressure intolerance: denies  Nasal congestion or rhinorrhea: +yes nasal congestion - states he does not take any medication for this because medications "make this worse so I let it go" has tried nasal sprays and oral allergy medications such as claritin and zyrtec   Nasal and oral dryness: +dry mouth   Snoring with PAP: denies     Using PAP every night/day: tries to use CPAP every night   Using PAP the entire duration of sleep: gets approximately 4 hours of sleep   Benefiting from PAP: yes feels more rested     Caffeine: 32 oz coffee in the morning   Alcohol: denies  Denies illicit drug use     Driving while drowsy: denies    [place]:     Social history updates:  Social History     Tobacco Use   Smoking Status Former   • Packs/day: 0.50   • Years: 22.00   • Total pack years: 11.00   • Types: Cigarettes   • Start date: 1999   • Quit date: 3/22/2021   • Years since quittin.5   Smokeless Tobacco Former   • Types: Chew   • Quit date: 10/1/2020     Social History     Socioeconomic History   • Marital status: Single     Spouse name: Not on file   • Number of children: 1   • Years of education: Not on file   • Highest education level: Not on file   Occupational History   • Occupation:      Employer: [address] [place] [address]   Tobacco Use   • Smoking status: Former     Packs/day: 0.50     Years: 22.00     Total pack years: 11.00     Types: Cigarettes     Start date: 1999     Quit date: 3/22/2021     Years since quittin.5   • Smokeless tobacco: Former     Types: Chew     Quit date: 10/1/2020   Vaping Use   • Vaping Use: Former   • Start date: 2013   • Quit date: 3/10/2021   • Substances: Nicotine, CBD, Flavoring   Substance and Sexual Activity   • Alcohol use: Yes     Alcohol/week: 1.0 standard drink of alcohol     Types: 1 Cans of beer per week   • Drug use: Not Currently     Types: Marijuana     Comment: Last use    • Sexual activity: Yes     Partners: Female     Birth control/protection: OCP   Other Topics Concern   • Not on file   Social History Narrative    Single    Lives with girlfriend Priscilla Lebron and son Josy Batter    1 Child - 1 Son         Social Determinants of Health     Financial Resource Strain: Not on file   Food Insecurity: Not on file   Transportation Needs: Not on file   Physical Activity: Not on file   Stress: Not on file   Social Connections: Not on file   Intimate Partner Violence: Not on file   Housing Stability: Not on file       PhysicalExamination:  Vitals:   /90 (BP Location: Left arm, Patient Position: Sitting, Cuff Size: Large)   Pulse 72   Temp 98.5 °F (36.9 °C) (Tympanic)   Ht 6' 4" (1.93 m)   Wt (!) 147 kg (323 lb)   SpO2 98%   BMI 39.32 kg/m²     Physical Exam:  General: Sitting in chair, awake alert and oriented to person, place, and time. No acute distress  HEENT: PERRL, nares patent, no craniofacial abnormalities. Mucous membranes, moist, no oral lesions, and normal dentition. Mallampati class IV, tonsils 1+  NECK:  Trachea midline, no accessory muscle use, and no stridor   CARDIAC: Regular rate and rhythm  PULM: CTA bilaterally no wheezing, rhonchi or rales.  No conversational dyspnea  EXT: No cyanosis, no clubbing, and no peripheral edema    NEURO: No focal neurologic deficits, moving all extremities appropriately    Diagnostic Data:  Labs: I personally reviewed the most recent laboratory data pertinent to today's visit  Lab Results   Component Value Date    WBC 4.45 03/13/2023    HGB 14.5 03/13/2023    HCT 43.5 03/13/2023    MCV 90 03/13/2023     03/13/2023     Lab Results   Component Value Date    CALCIUM 9.1 03/13/2023    K 4.6 03/13/2023    CO2 27 03/13/2023     03/13/2023    BUN 12 03/13/2023    CREATININE 0.90 03/13/2023     No results found for: "IGE"  Lab Results   Component Value Date    ALT 38 03/13/2023    AST 22 03/13/2023    ALKPHOS 56 03/13/2023     No results found for: "IRON", "TIBC", "FERRITIN"  No results found for: "Frosty Mcnamara"  No results found for: "FOLATE"      Arterial Blood Gas result: N/A    Sleep studies:  HST 5/12/2023: Mild obstructive sleep apnea with NIDIA 10.9, SpO2 giuseppe 83%       Compliance Data (8/26/2023 - 9/24/2023):   Type of CPAP:  ResMed AirSense 11 AutoSet                                   Percent usage: 50% (15/30 days for >=4 hours)                                   Average time used: 4 hours 49 minutes                                    Time in large leak: 20.4 at 95th percentile                                    Residual AHI: 0.9                                          [address] [place] [address]  Sleep Medicine Fellow

## 2023-09-25 NOTE — PATIENT INSTRUCTIONS
It is very important to avoid driving while drowsy, this can be very dangerous or even cause serious injury or death. If sleepy, it is not safe to get behind the wheel. If you are driving and feels sleepy, it is very important to pull over right away. Even losing control of the car for a split second can be deadly. If you feel you cannot control when sleepiness occurs and cannot prevented, it is important to not drive at all until this improves. Please let me know if you experience this as it is very important. Nursing Support:  When: Monday through Friday 7A-5PM except holidays  Where: Our direct line is [phone]. If you are having a true emergency please call 911. In the event that the line is busy or it is after hours please leave a voice message and we will return your call. Please speak clearly, leaving your full name, birth date, best number to reach you and the reason for your call. Medication refills: We will need the name of the medication, the dosage, the ordering provider, whether you get a 30 or 90 day refill, and the pharmacy name and address. Medications will be ordered by the provider only. Nurses cannot call in prescriptions. Please allow 7 days for medication refills. Physician requested updates: If your provider requested that you call with an update after starting medication, please be ready to provide us the medication and dosage, what time you take your medication, the time you attempt to fall asleep, time you fall asleep, when you wake up, and what time you get out of bed. Sleep Study Results: We will contact you with sleep study results and/or next steps after the physician has reviewed your testing.

## 2023-11-20 ENCOUNTER — TELEPHONE (OUTPATIENT)
Age: 33
End: 2023-11-20

## 2023-11-20 NOTE — TELEPHONE ENCOUNTER
Pt came into [employer] office to drop off DOT form he needs filled out by Dr. Eh Mathis. Once completed, he is asking that we fax it to [facility] at [phone]. He also would like a call to come  completed form since there has been issues with it being misplaced in the past. Blank forms scanned into chart and attached to this encounter. Will also provide original copy to MA.

## 2023-11-21 NOTE — TELEPHONE ENCOUNTER
Form was completed on 11/20/2023 and given to [address] [place] [address] at the front.      Cristel Cueto

## 2024-04-01 ENCOUNTER — TELEPHONE (OUTPATIENT)
Dept: FAMILY MEDICINE CLINIC | Facility: CLINIC | Age: 34
End: 2024-04-01

## 2024-04-01 NOTE — TELEPHONE ENCOUNTER
Called and spoke with patient. Patient states they are unable to schedule overdue appointment at this time.

## 2024-04-19 ENCOUNTER — TELEPHONE (OUTPATIENT)
Age: 34
End: 2024-04-19

## 2024-06-19 PROCEDURE — 99283 EMERGENCY DEPT VISIT LOW MDM: CPT

## 2024-06-20 ENCOUNTER — HOSPITAL ENCOUNTER (EMERGENCY)
Facility: HOSPITAL | Age: 34
Discharge: HOME | End: 2024-06-20
Attending: EMERGENCY MEDICINE
Payer: OTHER MISCELLANEOUS

## 2024-06-20 ENCOUNTER — APPOINTMENT (EMERGENCY)
Dept: RADIOLOGY | Facility: HOSPITAL | Age: 34
End: 2024-06-20
Payer: OTHER MISCELLANEOUS

## 2024-06-20 VITALS
OXYGEN SATURATION: 96 % | SYSTOLIC BLOOD PRESSURE: 139 MMHG | RESPIRATION RATE: 18 BRPM | DIASTOLIC BLOOD PRESSURE: 86 MMHG | TEMPERATURE: 98 F | HEART RATE: 88 BPM

## 2024-06-20 DIAGNOSIS — S61.419A HAND LACERATION: ICD-10-CM

## 2024-06-20 DIAGNOSIS — S69.90XA HAND INJURY: Primary | ICD-10-CM

## 2024-06-20 PROCEDURE — 73130 X-RAY EXAM OF HAND: CPT

## 2024-06-20 PROCEDURE — 99284 EMERGENCY DEPT VISIT MOD MDM: CPT

## 2024-06-20 PROCEDURE — 12001 RPR S/N/AX/GEN/TRNK 2.5CM/<: CPT

## 2024-06-20 RX ORDER — LIDOCAINE HYDROCHLORIDE AND EPINEPHRINE 10; 10 MG/ML; UG/ML
10 INJECTION, SOLUTION INFILTRATION; PERINEURAL ONCE
Status: COMPLETED | OUTPATIENT
Start: 2024-06-20 | End: 2024-06-20

## 2024-06-20 RX ADMIN — LIDOCAINE HYDROCHLORIDE,EPINEPHRINE BITARTRATE 10 ML: 10; .01 INJECTION, SOLUTION INFILTRATION; PERINEURAL at 00:56

## 2024-06-20 NOTE — ED PROVIDER NOTES
History  Chief Complaint   Patient presents with    Hand Injury     Pt reports cutting top of R hand while working on an engine, bleeding controlled, does not take any blood thinners, last TDAP 3/7/23.     Praneeth Negrete is a 34 y.o. male with no significant PMH presenting to the ED on June 20, 2024 with hand injury. Patient endorses that he was at work this evening when he cut the top of his right hand while working on an engine.  Patient is right-handed.  At work he did his best to flush out the wound and wrapped it until he could come here for further evaluation.  He states that strength and sensation are intact. He has full range of motion of all of his fingers and his wrist. His last tetanus shot was last year. Patient denies blood thinners, weakness, numbness, tingling, decreased ROM or injuries to any other extremities.         Hand Injury      Prior to Admission Medications   Prescriptions Last Dose Informant Patient Reported? Taking?   Cholecalciferol (Vitamin D3) 125 MCG (5000 UT) CAPS   Yes No   Sig: Take 1 capsule by mouth in the morning   Lactobacillus (PROBIOTIC ACIDOPHILUS PO)   Yes No   Sig: Take 1 capsule by mouth in the morning   Multiple Vitamin (MULTIVITAMIN) tablet  Self Yes No   Sig: Take 1 tablet by mouth daily    Omega-3 Fatty Acids (Fish Oil) 1200 MG CAPS   Yes No   Sig: Take 2 capsules by mouth 2 (two) times a day   niacin 500 mg tablet  Self Yes No   Sig: Take 500 mg by mouth 2 (two) times a day with meals   psyllium (METAMUCIL SMOOTH TEXTURE) 28 % packet   Yes No   Sig: Take 1 packet by mouth 2 (two) times a day      Facility-Administered Medications: None       Past Medical History:   Diagnosis Date    Anxiety 07/22/2021    Back pain     See Chiro regularly    Class 2 severe obesity with serious comorbidity and body mass index (BMI) of 37.0 to 37.9 in adult (HCC)     History of asthma     Symptomatic when smoking.  Last used inhaler in 2011.    Hypertriglyceridemia     EUSEBIA (obstructive  sleep apnea) 05/15/2023    Mild    Other hemorrhoids     Other hyperlipidemia     Vitamin D deficiency        Past Surgical History:   Procedure Laterality Date    COLONOSCOPY  2016    +Hemorrhoids    INCISION AND DRAINAGE OF WOUND Left 9/17/2022    Procedure: INCISION AND DRAINAGE (I&D) EXTREMITY prepatella brusa;  Surgeon: Ken Aviles DO;  Location: [facility];  Service: Orthopedics       Family History   Problem Relation Age of Onset    Hyperlipidemia Mother     Restless legs syndrome Mother     No Known Problems Father     ADD / ADHD Brother     No Known Problems Son      I have reviewed and agree with the history as documented.    E-Cigarette/Vaping    E-Cigarette Use Former User     Start Date 1/1/13     Cartridges/Day 1     Quit Date 3/10/21      E-Cigarette/Vaping Substances    Nicotine Yes     THC No     CBD Yes     Flavoring Yes      Social History     Tobacco Use    Smoking status: Former     Current packs/day: 0.00     Average packs/day: 0.5 packs/day for 22.0 years (11.0 ttl pk-yrs)     Types: Cigarettes     Start date: 7/22/1999     Quit date: 3/22/2021     Years since quitting: 3.2    Smokeless tobacco: Former     Types: Chew     Quit date: 10/1/2020   Vaping Use    Vaping status: Former    Start date: 1/1/2013    Quit date: 3/10/2021    Substances: Nicotine, CBD, Flavoring   Substance Use Topics    Alcohol use: Yes     Alcohol/week: 1.0 standard drink of alcohol     Types: 1 Cans of beer per week    Drug use: Not Currently     Types: Marijuana     Comment: Last use 1/21       Review of Systems   Respiratory:  Negative for cough and shortness of breath.    Cardiovascular:  Negative for chest pain and palpitations.   Musculoskeletal:  Negative for arthralgias, joint swelling and myalgias.   Skin:  Positive for wound. Negative for color change and rash.   Neurological:  Negative for tremors, weakness and numbness.   All other systems reviewed and are negative.      Physical Exam  Physical Exam  Vitals  and nursing note reviewed.   Constitutional:       General: He is not in acute distress.     Appearance: Normal appearance. He is normal weight. He is not ill-appearing, toxic-appearing or diaphoretic.   HENT:      Head: Normocephalic and atraumatic.      Right Ear: External ear normal.      Left Ear: External ear normal.      Nose: Nose normal. No congestion or rhinorrhea.      Mouth/Throat:      Mouth: Mucous membranes are moist.   Eyes:      General: No scleral icterus.        Right eye: No discharge.         Left eye: No discharge.      Extraocular Movements: Extraocular movements intact.      Conjunctiva/sclera: Conjunctivae normal.   Cardiovascular:      Rate and Rhythm: Normal rate and regular rhythm.      Pulses: Normal pulses.      Heart sounds: Normal heart sounds. No murmur heard.     No friction rub. No gallop.      Comments: Radial pulses 2+ bilaterally  Pulmonary:      Effort: Pulmonary effort is normal. No respiratory distress.      Breath sounds: Normal breath sounds. No wheezing, rhonchi or rales.   Abdominal:      General: Abdomen is flat.   Musculoskeletal:         General: Signs of injury present. No swelling or deformity. Normal range of motion.      Right wrist: Normal. Normal pulse.      Left wrist: Normal. Normal pulse.      Right hand: Laceration present. No tenderness or bony tenderness. Normal range of motion. Normal strength. Normal sensation. Normal pulse.      Left hand: Normal.        Hands:       Cervical back: Normal range of motion and neck supple. No rigidity.      Comments: laceration   Skin:     General: Skin is warm.      Capillary Refill: Capillary refill takes less than 2 seconds.      Coloration: Skin is not pale.      Findings: No erythema.   Neurological:      General: No focal deficit present.      Mental Status: He is alert and oriented to person, place, and time. Mental status is at baseline.      Motor: No weakness.      Gait: Gait normal.   Psychiatric:         Mood  "and Affect: Mood normal.         Behavior: Behavior normal.         Vital Signs  ED Triage Vitals [06/20/24 0005]   Temperature Pulse Respirations Blood Pressure SpO2   98 °F (36.7 °C) 88 18 139/86 96 %      Temp Source Heart Rate Source Patient Position - Orthostatic VS BP Location FiO2 (%)   Oral Monitor Sitting Left arm --      Pain Score       1           Vitals:    06/20/24 0005   BP: 139/86   Pulse: 88   Patient Position - Orthostatic VS: Sitting         Visual Acuity      ED Medications  Medications   lidocaine-epinephrine (XYLOCAINE/EPINEPHRINE) 1 %-1:100,000 injection 10 mL (10 mL Infiltration Given by Other 6/20/24 0056)       Diagnostic Studies  Results Reviewed       None                   XR hand 3+ views RIGHT    (Results Pending)              Procedures  Universal Protocol:  Consent: Verbal consent obtained. Written consent not obtained.  Risks and benefits: risks, benefits and alternatives were discussed  Consent given by: patient  Time out: Immediately prior to procedure a \"time out\" was called to verify the correct patient, procedure, equipment, support staff and site/side marked as required.  Patient understanding: patient states understanding of the procedure being performed  Patient consent: the patient's understanding of the procedure matches consent given  Procedure consent: procedure consent matches procedure scheduled  Relevant documents: relevant documents present and verified  Test results: test results available and properly labeled  Site marked: the operative site was marked  Radiology Images displayed and confirmed. If images not available, report reviewed: imaging studies available  Required items: required blood products, implants, devices, and special equipment available  Patient identity confirmed: verbally with patient and arm band  Laceration repair    Date/Time: 6/20/2024 1:00 AM    Performed by: Cynthia George PA-C  Authorized by: Cynthia George PA-C  Body area: upper " extremity  Location details: right hand  Laceration length: 2.5 cm  Foreign bodies: no foreign bodies  Tendon involvement: none  Nerve involvement: none  Vascular damage: no  Anesthesia: local infiltration    Anesthesia:  Local Anesthetic: lidocaine 1% with epinephrine  Anesthetic total: 6 mL    Sedation:  Patient sedated: no      Wound Dehiscence:  Superficial Wound Dehiscence: simple closure      Procedure Details:  Preparation: Patient was prepped and draped in the usual sterile fashion.  Irrigation solution: saline and tap water  Irrigation method: syringe and tap  Amount of cleaning: standard  Debridement: none  Degree of undermining: none  Skin closure: 5-0 nylon  Number of sutures: 7  Technique: simple  Approximation: close  Approximation difficulty: simple  Dressing: gauze roll (Xeroform)  Patient tolerance: patient tolerated the procedure well with no immediate complications               ED Course                                             Medical Decision Making  Patient seen and examined noted to have an injury and hand laceration.  X-rays were ordered to evaluate for any fractures or osseous abnormalities however none were noted on my exam.  Wound was thoroughly irrigated and closed which patient tolerated well.  As this occurred at work patient will go to occupational health in the next 24 hours to be seen by them for Worker's Comp.  Patient expressed agreement and understanding of this.  Tetanus was updated last year.  Strict return precautions were discussed which he expresses understanding of.    Differential diagnosis includes but is not limited to laceration, deep structure involvement, foreign body, fracture, wound infection.      Imaging revealed:   No osseous abnormalities    EKG: Was interpreted by myself as above.     Patient appears well, is nontoxic appearing, he expresses understanding and agrees with plan of care at this time.  In light of this patient would benefit from outpatient  "management.    Patient at time of discharge well-appearing in no acute distress, all questions answered. Patient agreeable to plan.  Patient's vitals, lab/imaging results, diagnosis, and treatment plan were discussed with the patient. All new/changed medications were discussed with patient, specifically, route of administration, how often and when to take, and where they can be picked up. Strict return precautions as well as close follow up with PCP was discussed with the patient and the patient was agreeable to my recommendations. Patient verbally acknowledged understanding of the above communications. Strict return precautions were provided. All labs reviewed and utilized in the medical decision making process (if labs were ordered). Portions of the record may have been created with voice recognition software.  Occasional wrong word or \"sound a like\" substitutions may have occurred due to the inherent limitations of voice recognition software.  Read the chart carefully and recognize, using context, where substitutions have occurred.      Amount and/or Complexity of Data Reviewed  Radiology: ordered.    Risk  Prescription drug management.             Disposition  Final diagnoses:   Hand injury   Hand laceration     Time reflects when diagnosis was documented in both MDM as applicable and the Disposition within this note       Time User Action Codes Description Comment    6/20/2024  1:24 AM Cynthia George Add [S69.90XA] Hand injury     6/20/2024  1:24 AM Cynthia George Add [S61.419A] Hand laceration           ED Disposition       ED Disposition   Discharge    Condition   Stable    Date/Time   Thu Jun 20, 2024  1:23 AM    Comment   Praneeth Negrete discharge to home/self care.                   Follow-up Information       Follow up With Specialties Details Why Contact Info Additional Information    [facility]  Within 24 hours for workers comp [address] [facility] " [id number]  [phone] [facility], [address] [place] [address] Rd, [place], [place], [address]   [phone]    [facility] Emergency Department Emergency Medicine  If symptoms worsen, As needed [address] [facility] [address]  [phone] [facility] Emergency Department, [address] [place], [place], [address]            Discharge Medication List as of 6/20/2024  1:27 AM        CONTINUE these medications which have NOT CHANGED    Details   Cholecalciferol (Vitamin D3) 125 MCG (5000 UT) CAPS Take 1 capsule by mouth in the morning, Historical Med      Lactobacillus (PROBIOTIC ACIDOPHILUS PO) Take 1 capsule by mouth in the morning, Historical Med      Multiple Vitamin (MULTIVITAMIN) tablet Take 1 tablet by mouth daily , Historical Med      niacin 500 mg tablet Take 500 mg by mouth 2 (two) times a day with meals, Historical Med      Omega-3 Fatty Acids (Fish Oil) 1200 MG CAPS Take 2 capsules by mouth 2 (two) times a day, Historical Med      psyllium (METAMUCIL SMOOTH TEXTURE) 28 % packet Take 1 packet by mouth 2 (two) times a day, Historical Med             No discharge procedures on file.    PDMP Review         Value Time User    PDMP Reviewed  Yes 7/22/2021  3:08 PM Isabel Coreas DO            ED Provider  Electronically Signed by             Cynthia George PA-C  06/20/24 0454

## 2024-06-20 NOTE — Clinical Note
Praneeth Negrete was seen and treated in our emergency department on 6/19/2024.    Occupational Medicine Follow Up Within 24 hours            Diagnosis:     Edward  .    He may return on this date: 06/21/2024         If you have any questions or concerns, please don't hesitate to call.      Cynthia George PA-C    ______________________________           _______________          _______________  Hospital Representative                              Date                                Time

## 2024-06-20 NOTE — DISCHARGE INSTRUCTIONS
Please follow up with occupational health in the next 24 hours for workmans comp. Keep you stiches clean and dry for the next 24-48 hours. Have them removed by primary care/urgent care/ER in 7 days. Return to the ER if you develop increased pain, swelling, drainage or fevers and chills.

## 2024-06-20 NOTE — ED NOTES
Xray at bedside. Surgifoam, gauze, and kerlix applied to hand to control bleeding.      Maria De Jesus Leslie RN  06/20/24 0022

## 2024-06-21 ENCOUNTER — APPOINTMENT (OUTPATIENT)
Dept: URGENT CARE | Age: 34
End: 2024-06-21
Payer: OTHER MISCELLANEOUS

## 2024-06-21 PROCEDURE — 99213 OFFICE O/P EST LOW 20 MIN: CPT | Performed by: PHYSICIAN ASSISTANT

## 2024-06-28 ENCOUNTER — APPOINTMENT (OUTPATIENT)
Dept: URGENT CARE | Age: 34
End: 2024-06-28
Payer: OTHER MISCELLANEOUS

## 2024-06-28 PROCEDURE — 99213 OFFICE O/P EST LOW 20 MIN: CPT | Performed by: PHYSICIAN ASSISTANT

## 2024-08-20 ENCOUNTER — TELEPHONE (OUTPATIENT)
Age: 34
End: 2024-08-20

## 2024-08-26 ENCOUNTER — OFFICE VISIT (OUTPATIENT)
Age: 34
End: 2024-08-26
Payer: COMMERCIAL

## 2024-08-26 VITALS
BODY MASS INDEX: 38.36 KG/M2 | OXYGEN SATURATION: 98 % | WEIGHT: 315 LBS | HEART RATE: 81 BPM | HEIGHT: 76 IN | SYSTOLIC BLOOD PRESSURE: 138 MMHG | DIASTOLIC BLOOD PRESSURE: 90 MMHG

## 2024-08-26 DIAGNOSIS — G47.33 OSA (OBSTRUCTIVE SLEEP APNEA): Primary | ICD-10-CM

## 2024-08-26 DIAGNOSIS — E66.09 CLASS 2 OBESITY DUE TO EXCESS CALORIES WITHOUT SERIOUS COMORBIDITY WITH BODY MASS INDEX (BMI) OF 39.0 TO 39.9 IN ADULT: ICD-10-CM

## 2024-08-26 PROCEDURE — 99214 OFFICE O/P EST MOD 30 MIN: CPT | Performed by: INTERNAL MEDICINE

## 2024-08-26 NOTE — PROGRESS NOTES
Progress Note - Sleep Medicine  Praneeth Negrete 34 y.o. male MRN: [id number]       Impression & Plan:     1.  Mild obstructive sleep apnea  HST NIDIA 10.9    Compliance from 7/23 - 8/21  More than 4 hours 60%  On APAP 4-20  95 percentile pressure 6.0  Median leak 2.1  Residual AHI 0.6    Patient has moderate compliance    Current Anthony score of 9    Counseled patient on increasing usage to at least 7 hours and using it every night    Plan  Follow-up in 3 months  Ordered CPAP supplies    2.  Obesity  BMI 39.63  Counseled patient lifestyle modifications including diet and exercise  I explained that weight loss will decrease severity of sleep apnea      ______________________________________________________________________    HPI:    Praneeth Negrete 34-year-old male with past medical history of hyperlipidemia, vitamin D deficiency who presents for follow-up of mild obstructive sleep apnea.    He has been gaining weight.  He works third shift.  On the weekdays and workdays he sleeps from anywhere from 9:30 AM to 2 PM.  On weekends he sleeps from 9 PM to 1 AM.  He is getting anywhere from 4 to 6 hours of sleep.    He was initially diagnosed with mild obstructive sleep apnea.  He initially presented with heavy snoring.  HST showed an NIDIA of 10.9 events per hour with oxygen giuseppe 83%.    Patient has moderate compliance.  He states that his sleep schedule is highly variable due to his work and also taking care of the kids.  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  He plans to switch to dayshift.        Review of Systems:  Review of Systems   All other systems reviewed and are negative.        Social history updates:  Social History     Tobacco Use   Smoking Status Former    Current packs/day: 0.00    Average packs/day: 0.5 packs/day for 22.0 years (11.0 ttl pk-yrs)    Types: Cigarettes    Start date: 7/22/1999    Quit date: 3/22/2021    Years since quitting: 3.4   Smokeless Tobacco Former    Types:  "Chew    Quit date: 10/1/2020     Social History     Socioeconomic History    Marital status: Single     Spouse name: Not on file    Number of children: 1    Years of education: Not on file    Highest education level: Not on file   Occupational History    Occupation:      Employer: [employer]   Tobacco Use    Smoking status: Former     Current packs/day: 0.00     Average packs/day: 0.5 packs/day for 22.0 years (11.0 ttl pk-yrs)     Types: Cigarettes     Start date: 7/22/1999     Quit date: 3/22/2021     Years since quitting: 3.4    Smokeless tobacco: Former     Types: Chew     Quit date: 10/1/2020   Vaping Use    Vaping status: Former    Start date: 1/1/2013    Quit date: 3/10/2021    Substances: Nicotine, CBD, Flavoring   Substance and Sexual Activity    Alcohol use: Yes     Alcohol/week: 1.0 standard drink of alcohol     Types: 1 Cans of beer per week    Drug use: Not Currently     Types: Marijuana     Comment: Last use 1/21    Sexual activity: Yes     Partners: Female     Birth control/protection: OCP   Other Topics Concern    Not on file   Social History Narrative    Single    Lives with girlfriend Vandana Dyer and son Pablo    1 Child - 1 Son    Darlene Zepeda     Social Determinants of Health     Financial Resource Strain: Not on file   Food Insecurity: Not on file   Transportation Needs: Not on file   Physical Activity: Not on file   Stress: Not on file   Social Connections: Not on file   Intimate Partner Violence: Not on file   Housing Stability: Not on file       PhysicalExamination:  Vitals:   /90 (BP Location: Left arm, Patient Position: Sitting, Cuff Size: Large)   Pulse 81   Ht 6' 4\" (1.93 m)   Wt (!) 148 kg (325 lb 9.6 oz)   SpO2 98%   BMI 39.63 kg/m²     Physical Exam  General:  Awake alert and oriented x 3, conversant without conversational dyspnea, NAD, normal affect  HEENT:  PERRL, Sclera noninjected, nonicteric OU, Nares patent,  no craniofacial abnormalities, " Mucous membranes, moist, no oral lesions, normal dentition  NECK: Trachea midline, no accessory muscle use, no stridor, no cervical or supraclavicular adenopathy, JVP not elevated  CARDIAC: Reg, single s1/S2, no m/r/g  PULM: CTA bilaterally no wheezing, rhonchi or rales  EXT: No cyanosis, no clubbing, no edema, normal capillary refill  NEURO: no focal neurologic deficits, AAOx3, moving all extremities appropriately     Diagnostic Data:  Labs:  I personally reviewed the most recent laboratory data pertinent to today's visit  No visits with results within 6 Month(s) from this visit.   Latest known visit with results is:   Telephone on 07/17/2023   Component Date Value    Supplier Name 07/17/2023 [employer] [facility] [employer]     Supplier Phone Number 07/17/2023 [phone]     Order Status 07/17/2023 Pending Patient Contact     Delivery Note 07/17/2023 [facility].     Delivery Request Date 07/17/2023 07/31/2023     Date Delivered  07/17/2023 07/31/2023     Supplier Name 07/17/2023 07/31/2023     Item Description 07/17/2023 CPAP Machine, Resmed S10 Auto-CPAP     Item Description 07/17/2023 PAP Mask, Full Face, Fit Upon Setup, N/A, 1 per 3 months     Item Description 07/17/2023 PAP Mask Interface Cushion, Full Face, 1 per 1 month     Item Description 07/17/2023 PAP Headgear, 1 per 6 months     Item Description 07/17/2023 PAP Humidifier, Heated     Item Description 07/17/2023 Disposable PAP Filter, 2 per 1 month     Item Description 07/17/2023 Non-Disposable PAP Filter, 1 per 6 months     Item Description 07/17/2023 PAP Machine Tubing, Heated, 1 per 3 months     Item Description 07/17/2023 PAP Monitoring Modem     Item Description 07/17/2023 Humidifier Water Chamber, 1 per 6 months        I have personally reviewed pertinent lab results.  Lab Results   Component Value Date    WBC 4.45 03/13/2023    HGB 14.5 03/13/2023    HCT 43.5 03/13/2023    MCV 90 03/13/2023     03/13/2023     Lab Results  "  Component Value Date    CALCIUM 9.1 03/13/2023    K 4.6 03/13/2023    CO2 27 03/13/2023     03/13/2023    BUN 12 03/13/2023    CREATININE 0.90 03/13/2023     No results found for: \"IGE\"  Lab Results   Component Value Date    ALT 38 03/13/2023    AST 22 03/13/2023    ALKPHOS 56 03/13/2023     No results found for: \"IRON\", \"TIBC\", \"FERRITIN\"  No results found for: \"TNZBYVNA75\"  No results found for: \"FOLATE\"      Arterial Blood Gas result:  LUIS CARLOS Siddiqui MD  [facility] Sleep Medicine    "

## 2024-08-28 ENCOUNTER — TELEPHONE (OUTPATIENT)
Dept: SLEEP CENTER | Facility: CLINIC | Age: 34
End: 2024-08-28

## 2024-08-29 LAB

## 2024-11-13 ENCOUNTER — APPOINTMENT (OUTPATIENT)
Dept: URGENT CARE | Facility: MEDICAL CENTER | Age: 34
End: 2024-11-13

## 2024-12-16 ENCOUNTER — OFFICE VISIT (OUTPATIENT)
Age: 34
End: 2024-12-16
Payer: COMMERCIAL

## 2024-12-16 VITALS
HEART RATE: 84 BPM | OXYGEN SATURATION: 94 % | BODY MASS INDEX: 38.36 KG/M2 | SYSTOLIC BLOOD PRESSURE: 120 MMHG | WEIGHT: 315 LBS | DIASTOLIC BLOOD PRESSURE: 82 MMHG | HEIGHT: 76 IN

## 2024-12-16 DIAGNOSIS — G47.33 OSA (OBSTRUCTIVE SLEEP APNEA): Primary | ICD-10-CM

## 2024-12-16 DIAGNOSIS — E66.813 CLASS 3 SEVERE OBESITY DUE TO EXCESS CALORIES WITHOUT SERIOUS COMORBIDITY WITH BODY MASS INDEX (BMI) OF 40.0 TO 44.9 IN ADULT (HCC): ICD-10-CM

## 2024-12-16 DIAGNOSIS — E66.01 CLASS 3 SEVERE OBESITY DUE TO EXCESS CALORIES WITHOUT SERIOUS COMORBIDITY WITH BODY MASS INDEX (BMI) OF 40.0 TO 44.9 IN ADULT (HCC): ICD-10-CM

## 2024-12-16 PROCEDURE — 99214 OFFICE O/P EST MOD 30 MIN: CPT | Performed by: INTERNAL MEDICINE

## 2024-12-16 NOTE — ASSESSMENT & PLAN NOTE
1.  Mild obstructive sleep apnea  HST NIDIA 10.9    Compliance from 11/12-12/11  More than 4 hours 60%  On APAP 4-20  95 percentile pressure 8.5  Median leak 1.9  Residual AHI 1.6    Patient has moderate compliance    Current Maurice score of 6    Counseled patient on increasing usage to at least 7 hours and using it every night    Plan  Ordered mask fitting  Follow up in 6 months    Orders:    Mask fitting only; Future

## 2024-12-16 NOTE — PROGRESS NOTES
Name: Praneeth Negrete      : 1990      MRN: [id number]  Encounter Provider: Arnaldo Siddiqui MD  Encounter Date: 2024   Encounter department: [facility] SLEEP [facility]IC[facility]    :  Assessment & Plan  EUSEBIA (obstructive sleep apnea)  1.  Mild obstructive sleep apnea  HST NIDIA 10.9    Compliance from -  More than 4 hours 60%  On APAP 4-20  95 percentile pressure 8.5  Median leak 1.9  Residual AHI 1.6    Patient has moderate compliance    Current Overgaard score of 6    Counseled patient on increasing usage to at least 7 hours and using it every night    Plan  Ordered mask fitting  Follow up in 6 months    Orders:    Mask fitting only; Future    Class 3 severe obesity due to excess calories without serious comorbidity with body mass index (BMI) of 40.0 to 44.9 in adult (HCC)  Counseled patient on lifestyle modifications including diet and exercise  Explained that weight loss will decrease the severity of sleep apnea             History of Present Illness     Praneeth Negrete 34-year-old male with past medical history of hyperlipidemia, vitamin D deficiency who presents for follow-up of mild obstructive sleep apnea.    He has been gaining weight.  He works third shift.  On the weekdays and workdays he sleeps from anywhere from 9:30 AM to 2 PM.  On weekends he sleeps from 9 PM to 1 AM.  He is getting anywhere from 4 to 6 hours of sleep.    He was initially diagnosed with mild obstructive sleep apnea.  He initially presented with heavy snoring.  HST showed an NIDIA of 10.9 events per hour with oxygen giuseppe 83%.    Patient has moderate compliance.  He states that his sleep schedule is highly variable due to his work and also taking care of the kids.  He plans to switch to dayshif in the future.t he reports dry nose and dry mouth.  Reports bothersome air leaks.  Current Overgaard score of 6.                                              Review of Systems   All other systems reviewed and are negative.    Pertinent  positives/negatives included in HPI and also as noted:       Pertinent Medical History         Medical History Reviewed by provider this encounter:     .  Past Medical History   Past Medical History:   Diagnosis Date    Anxiety 07/22/2021    Back pain     See Chiro regularly    Class 2 severe obesity with serious comorbidity and body mass index (BMI) of 37.0 to 37.9 in adult (HCC)     History of asthma     Symptomatic when smoking.  Last used inhaler in 2011.    Hypertriglyceridemia     EUSEBIA (obstructive sleep apnea) 05/15/2023    Mild    Other hemorrhoids     Other hyperlipidemia     Vitamin D deficiency      Past Surgical History:   Procedure Laterality Date    COLONOSCOPY  2016    +Hemorrhoids    INCISION AND DRAINAGE OF WOUND Left 9/17/2022    Procedure: INCISION AND DRAINAGE (I&D) EXTREMITY prepatella bru;  Surgeon: Ken Aviles DO;  [facility];  Service: Orthopedics     Family History   Problem Relation Age of Onset    Hyperlipidemia Mother     Restless legs syndrome Mother     No Known Problems Father     ADD / ADHD Brother     No Known Problems Son       reports that he quit smoking about 3 years ago. His smoking use included cigarettes. He started smoking about 25 years ago. He has a 11 pack-year smoking history. He quit smokeless tobacco use about 4 years ago.  His smokeless tobacco use included chew. He reports current alcohol use of about 1.0 standard drink of alcohol per week. He reports that he does not currently use drugs after having used the following drugs: Marijuana.  Current Outpatient Medications on File Prior to Visit   Medication Sig Dispense Refill    Cholecalciferol (Vitamin D3) 125 MCG (5000 UT) CAPS Take 1 capsule by mouth in the morning      Lactobacillus (PROBIOTIC ACIDOPHILUS PO) Take 1 capsule by mouth in the morning      Multiple Vitamin (MULTIVITAMIN) tablet Take 1 tablet by mouth daily       niacin 500 mg tablet Take 500 mg by mouth 2 (two) times a day with meals       "Omega-3 Fatty Acids (Fish Oil) 1200 MG CAPS Take 2 capsules by mouth 2 (two) times a day      psyllium (METAMUCIL SMOOTH TEXTURE) 28 % packet Take 1 packet by mouth 2 (two) times a day       No current facility-administered medications on file prior to visit.     Allergies   Allergen Reactions    Amoxicillin Swelling     Swollen taste buds, taste alteration, increased temperature sensitivity      Current Outpatient Medications on File Prior to Visit   Medication Sig Dispense Refill    Cholecalciferol (Vitamin D3) 125 MCG (5000 UT) CAPS Take 1 capsule by mouth in the morning      Lactobacillus (PROBIOTIC ACIDOPHILUS PO) Take 1 capsule by mouth in the morning      Multiple Vitamin (MULTIVITAMIN) tablet Take 1 tablet by mouth daily       niacin 500 mg tablet Take 500 mg by mouth 2 (two) times a day with meals      Omega-3 Fatty Acids (Fish Oil) 1200 MG CAPS Take 2 capsules by mouth 2 (two) times a day      psyllium (METAMUCIL SMOOTH TEXTURE) 28 % packet Take 1 packet by mouth 2 (two) times a day       No current facility-administered medications on file prior to visit.      Social History     Tobacco Use    Smoking status: Former     Current packs/day: 0.00     Average packs/day: 0.5 packs/day for 22.0 years (11.0 ttl pk-yrs)     Types: Cigarettes     Start date: 7/22/1999     Quit date: 3/22/2021     Years since quitting: 3.7    Smokeless tobacco: Former     Types: Chew     Quit date: 10/1/2020   Vaping Use    Vaping status: Former    Start date: 1/1/2013    Quit date: 3/10/2021    Substances: Nicotine, CBD, Flavoring   Substance and Sexual Activity    Alcohol use: Yes     Alcohol/week: 1.0 standard drink of alcohol     Types: 1 Cans of beer per week    Drug use: Not Currently     Types: Marijuana     Comment: Last use 1/21    Sexual activity: Yes     Partners: Female     Birth control/protection: OCP     Objective   /82 (BP Location: Left arm, Patient Position: Sitting, Cuff Size: Large)   Pulse 84   Ht 6' 4\" " "(1.93 m)   Wt (!) 152 kg (334 lb 6.4 oz)   SpO2 94%   BMI 40.70 kg/m²        Physical Exam  Vitals reviewed.   HENT:      Head: Normocephalic and atraumatic.      Nose: Nose normal.      Mouth/Throat:      Mouth: Mucous membranes are moist.   Eyes:      Extraocular Movements: Extraocular movements intact.      Pupils: Pupils are equal, round, and reactive to light.   Cardiovascular:      Rate and Rhythm: Normal rate and regular rhythm.      Pulses: Normal pulses.   Pulmonary:      Effort: Pulmonary effort is normal.      Breath sounds: Normal breath sounds.   Abdominal:      General: Abdomen is flat. Bowel sounds are normal.      Palpations: Abdomen is soft.   Musculoskeletal:         General: Normal range of motion.      Cervical back: Normal range of motion.   Skin:     General: Skin is warm.   Neurological:      Mental Status: He is alert. Mental status is at baseline.   Psychiatric:         Mood and Affect: Mood normal.       Visit Vitals  /82 (BP Location: Left arm, Patient Position: Sitting, Cuff Size: Large)   Pulse 84   Ht 6' 4\" (1.93 m)   Wt (!) 152 kg (334 lb 6.4 oz)   SpO2 94%   BMI 40.70 kg/m²   Smoking Status Former   BSA 2.76 m²         Data  Lab Results   Component Value Date    HGB 14.5 03/13/2023    HCT 43.5 03/13/2023    MCV 90 03/13/2023      Lab Results   Component Value Date    CALCIUM 9.1 03/13/2023    K 4.6 03/13/2023    CO2 27 03/13/2023     03/13/2023    BUN 12 03/13/2023    CREATININE 0.90 03/13/2023     No results found for: \"IRON\", \"TIBC\", \"FERRITIN\"  Lab Results   Component Value Date    AST 22 03/13/2023    ALT 38 03/13/2023             "

## 2025-03-25 ENCOUNTER — OFFICE VISIT (OUTPATIENT)
Dept: URGENT CARE | Facility: MEDICAL CENTER | Age: 35
End: 2025-03-25
Payer: COMMERCIAL

## 2025-03-25 VITALS
DIASTOLIC BLOOD PRESSURE: 82 MMHG | OXYGEN SATURATION: 98 % | HEART RATE: 98 BPM | WEIGHT: 315 LBS | BODY MASS INDEX: 38.36 KG/M2 | SYSTOLIC BLOOD PRESSURE: 144 MMHG | RESPIRATION RATE: 18 BRPM | HEIGHT: 76 IN | TEMPERATURE: 98 F

## 2025-03-25 DIAGNOSIS — J02.9 SORE THROAT: Primary | ICD-10-CM

## 2025-03-25 LAB — S PYO AG THROAT QL: NEGATIVE

## 2025-03-25 PROCEDURE — 87070 CULTURE OTHR SPECIMN AEROBIC: CPT | Performed by: PHYSICIAN ASSISTANT

## 2025-03-25 PROCEDURE — 87147 CULTURE TYPE IMMUNOLOGIC: CPT | Performed by: PHYSICIAN ASSISTANT

## 2025-03-25 PROCEDURE — G0382 LEV 3 HOSP TYPE B ED VISIT: HCPCS | Performed by: PHYSICIAN ASSISTANT

## 2025-03-25 NOTE — LETTER
March 25, 2025     Patient: Praneeth Negrete   YOB: 1990   Date of Visit: 3/25/2025       To Whom it May Concern:    Praneeth Negrete was seen in my clinic on 3/25/2025. He may return to work on 3/27/2025 .    If you have any questions or concerns, please don't hesitate to call.         Sincerely,          Orville Singh PA-C        CC: No Recipients

## 2025-03-25 NOTE — PROGRESS NOTES
[facility]        NAME: Praneeth Negrete is a 35 y.o. male  : 1990    MRN: [id number]  DATE: 2025  TIME: 10:18 AM    Assessment and Plan   Sore throat [J02.9]  1. Sore throat  POCT rapid ANTIGEN strepA            Patient Instructions     Pharyngitis  Salt water gargle  Follow up with PCP in 3-5 days.  Proceed to  ER if symptoms worsen.    Chief Complaint     Chief Complaint   Patient presents with    Cold Like Symptoms     Started 4 days ago with sore throat, swollen lymph nodes pain under neck hard to lift head up.  Left ear pain.  Has been taking severe congestion musinex.         History of Present Illness       35-year-old male who presents complaining of sore throat, congestion, left ear pain, swollen lymph nodes x 48 hours.  Denies chest pain, shortness of breath.  Has been taking over-the-counter medication for symptom relief.        Review of Systems   Review of Systems   Constitutional: Negative.  Negative for fever.   HENT:  Positive for congestion and sore throat. Negative for dental problem, drooling, ear pain, postnasal drip, rhinorrhea, sinus pain, trouble swallowing and voice change.    Eyes: Negative.    Respiratory: Negative.  Negative for apnea, cough, choking, chest tightness, shortness of breath, wheezing and stridor.    Cardiovascular: Negative.  Negative for chest pain.         Current Medications       Current Outpatient Medications:     Cholecalciferol (Vitamin D3) 125 MCG (5000 UT) CAPS, Take 1 capsule by mouth in the morning, Disp: , Rfl:     Lactobacillus (PROBIOTIC ACIDOPHILUS PO), Take 1 capsule by mouth in the morning, Disp: , Rfl:     Multiple Vitamin (MULTIVITAMIN) tablet, Take 1 tablet by mouth daily , Disp: , Rfl:     niacin 500 mg tablet, Take 500 mg by mouth 2 (two) times a day with meals, Disp: , Rfl:     Omega-3 Fatty Acids (Fish Oil) 1200 MG CAPS, Take 2 capsules by mouth 2 (two) times a day, Disp: , Rfl:     psyllium (METAMUCIL SMOOTH TEXTURE) 28 %  "packet, Take 1 packet by mouth 2 (two) times a day (Patient not taking: Reported on 3/25/2025), Disp: , Rfl:     Current Allergies     Allergies as of 03/25/2025 - Reviewed 03/25/2025   Allergen Reaction Noted    Amoxicillin Swelling 07/22/2021            The following portions of the patient's history were reviewed and updated as appropriate: allergies, current medications, past family history, past medical history, past social history, past surgical history and problem list.     Past Medical History:   Diagnosis Date    Anxiety 07/22/2021    Back pain     See Chiro regularly    Class 2 severe obesity with serious comorbidity and body mass index (BMI) of 37.0 to 37.9 in adult (HCC)     History of asthma     Symptomatic when smoking.  Last used inhaler in 2011.    Hypertriglyceridemia     EUSEBIA (obstructive sleep apnea) 05/15/2023    Mild    Other hemorrhoids     Other hyperlipidemia     Vitamin D deficiency        Past Surgical History:   Procedure Laterality Date    COLONOSCOPY  2016    +Hemorrhoids    INCISION AND DRAINAGE OF WOUND Left 9/17/2022    Procedure: INCISION AND DRAINAGE (I&D) EXTREMITY prepatella brusa;  Surgeon: Ken Aviles DO;  [facility];  Service: Orthopedics       Family History   Problem Relation Age of Onset    Hyperlipidemia Mother     Restless legs syndrome Mother     No Known Problems Father     ADD / ADHD Brother     No Known Problems Son          Medications have been verified.        Objective   /82   Pulse 98   Temp 98 °F (36.7 °C)   Resp 18   Ht 6' 4\" (1.93 m)   Wt (!) 152 kg (334 lb)   SpO2 98%   BMI 40.66 kg/m²        Physical Exam     Physical Exam  Constitutional:       General: He is not in acute distress.     Appearance: He is well-developed. He is not diaphoretic.   HENT:      Head: Normocephalic and atraumatic.      Right Ear: Hearing, tympanic membrane, ear canal and external ear normal.      Left Ear: Hearing, tympanic membrane, ear canal and external ear " normal.      Nose: Congestion present. No rhinorrhea.      Right Sinus: No maxillary sinus tenderness or frontal sinus tenderness.      Left Sinus: No maxillary sinus tenderness or frontal sinus tenderness.      Mouth/Throat:      Mouth: Mucous membranes are moist.      Pharynx: Oropharynx is clear. Uvula midline. Posterior oropharyngeal erythema present. No oropharyngeal exudate.   Cardiovascular:      Rate and Rhythm: Normal rate and regular rhythm.      Heart sounds: Normal heart sounds.   Pulmonary:      Effort: Pulmonary effort is normal. No respiratory distress.      Breath sounds: Normal breath sounds. No stridor. No wheezing, rhonchi or rales.   Chest:      Chest wall: No tenderness.   Musculoskeletal:      Cervical back: Normal range of motion and neck supple.   Lymphadenopathy:      Cervical: No cervical adenopathy.

## 2025-03-25 NOTE — PATIENT INSTRUCTIONS
Pharyngitis  Salt water gargle  Follow up with PCP in 3-5 days.  Proceed to  ER if symptoms worsen.

## 2025-03-28 LAB — BACTERIA THROAT CULT: ABNORMAL

## 2025-03-30 ENCOUNTER — RESULTS FOLLOW-UP (OUTPATIENT)
Dept: URGENT CARE | Facility: MEDICAL CENTER | Age: 35
End: 2025-03-30

## 2025-03-30 NOTE — TELEPHONE ENCOUNTER
Called patient, no answer, message left for patient, informed this provider would like to discuss throat culture results with the patient. Advised to call the clinic back at [phone].

## 2025-04-03 NOTE — UTILIZATION REVIEW
Initial Clinical Review    Admission: Date/Time/Statement:   Admission Orders (From admission, onward)     Ordered        09/17/22 0903  Inpatient Admission  Once            09/16/22 2134  Place in Observation  Once                      Orders Placed This Encounter    Inpatient Admission     Standing Status:   Standing     Number of Occurrences:   1     Order Specific Question:   Level of Care     Answer:   Med Surg [16]     Order Specific Question:   Estimated length of stay     Answer:   More than 2 Midnights     Order Specific Question:   Certification     Answer:   I certify that inpatient services are medically necessary for this patient for a duration of greater than two midnights  See H&P and MD Progress Notes for additional information about the patient's course of treatment  ED Arrival Information     Expected   -    Arrival   9/16/2022 16:50    Acuity   Urgent            Means of arrival   Walk-In    Escorted by   Self    Service   Hospitalist    Admission type   Urgent            Arrival complaint   Fluid in knee            Chief Complaint   Patient presents with    Knee Swelling     Pt reports L knee swelling, original injury was 3 weeks ago, reports last week was getting better and this weekend felt a pop and started to swell again, was told to come to ER to get it drained and for abx     Initial Presentation: 27 y o  male to ER from home,   Reports approx 2-3  wks ago he struck left knee on small [place] - no significant injury noted at the time but a week later "twisted" same knee and "felt a pop"   This even was followed by significant swelling of knee:  Seen at Urgent care  9/14,  Xray neg, started on Bactrim 2/2 concern for possible infection  FUP w/Ortho office today who sent pt to ER 2/2  concern for left septic prepatellar bursitis  Has completed 2D  Bactrim, denies fever/chills  Amb w/o difficulty    EXAM:  mild erythema of the left knee and anterior shin to ankle, boggy prominence on What Is The Reason For Today's Visit?: Full Body Skin Examination with No Concerns What Is The Reason For Today's Visit? (Being Monitored For X): concerning skin lesions on a periodic basis anterior patella, and swelling of LLE  Admit OBS Status,   MS Level of care for   Management of  Suspected L septic prepatellar bursitis w/celluitis (erythema extends from knee to ankle)   Will keep NPO,  Initiate  IVF @  125,  IVAB, fup blood culture,  Obtain venous duplex to r/o DVT  (result - NEG)   Consult  Ortho  Date:   9/17     Day 2:   CHANGED to INPATIENT  STATUS,  MS Level of care   Due to  Need for ongoing IVAB's w/serial assessment of  Left knee  9/17  ORTHO  Aspirated left knee at bedside,  Then  Took pt to OR/ general anesthesia   for  I&D: findings indicated septic bursitis  (intraop cultures initiated)    POSTOP:   Routine postop care,  B/l LE scd's;  Daily wgt;  I/O q shift;  Hourly inc spirometry; Ambulate QID  (WBAT LLE)  w/assist - PT/OT evals       -----------------------------------------------------------------------------------------------------------------------      ED Triage Vitals   Temperature Pulse Respirations Blood Pressure SpO2   09/16/22 1658 09/16/22 1658 09/16/22 1658 09/16/22 1658 09/16/22 1658   98 2 °F (36 8 °C) 84 18 119/73 99 %      Temp Source Heart Rate Source Patient Position - Orthostatic VS BP Location FiO2 (%)   09/16/22 1658 09/16/22 1658 09/16/22 1658 09/16/22 1658 --   Oral Monitor Sitting Left arm       Pain Score       09/16/22 2323       1          Wt Readings from Last 1 Encounters:   09/16/22 (!) 142 kg (313 lb 9 6 oz)     Additional Vital Signs:   09/17/22 0945 97 7 °F (36 5 °C) 80 16 115/62 -- 98 % None (Room air) WDL --   09/17/22 0930 97 7 °F (36 5 °C) 83 16 113/57 -- 96 % None (Room air) -- --   09/17/22 0925 97 7 °F (36 5 °C) 80 18 115/61 -- 93 % None (Room air) WDL --   09/17/22 0832 98 1 °F (36 7 °C) -- 20 140/80 -- 100 % None (Room air) -- --   09/17/22 07:34:02 97 8 °F (36 6 °C) 80 18 142/82 102 97 % None (Room air) -- Sitting   09/17/22 0515 -- -- -- -- -- 97 % None (Room air) -- --   09/16/22 2323 -- -- -- -- -- -- None (Room air) -- -- 09/16/22 23:08:13 97 9 °F (36 6 °C) 81 18 112/69 83 96 % None (Room air) -- Sitting       Pertinent Labs/Diagnostic Test Results:   ekg not available     VAS lower limb venous duplex study, unilateral/limited    (Results Pending)   9/17   RIGHT LOWER LIMB LIMITED:  Evaluation shows no evidence of thrombus in the common femoral vein  Doppler evaluation shows a normal response to augmentation maneuvers  LEFT LOWER LIMB:  No evidence of acute or chronic deep vein thrombosis  No evidence of superficial thrombophlebitis noted  Doppler evaluation shows a normal response to augmentation maneuvers  Popliteal, posterior tibial and anterior tibial arterial Doppler waveforms are  triphasic    ----------------------------------------------------------------------------------                Results from last 7 days   Lab Units 09/17/22  0513 09/16/22 1923   WBC Thousand/uL 6 31 6 56   HEMOGLOBIN g/dL 12 3 13 1   HEMATOCRIT % 37 2 39 9   PLATELETS Thousands/uL 246 281   NEUTROS ABS Thousands/µL 3 64 3 60         Results from last 7 days   Lab Units 09/17/22  0513 09/16/22 1923   SODIUM mmol/L 137 140   POTASSIUM mmol/L 3 9 3 7   CHLORIDE mmol/L 106 104   CO2 mmol/L 24 28   ANION GAP mmol/L 7 8   BUN mg/dL 12 15   CREATININE mg/dL 0 85 1 03   EGFR ml/min/1 73sq m 113 94   CALCIUM mg/dL 8 3* 9 0             Results from last 7 days   Lab Units 09/17/22  0513 09/16/22 1923   GLUCOSE RANDOM mg/dL 98 96     Results from last 7 days   Lab Units 09/16/22  1923   BLOOD CULTURE  Received in Microbiology Lab  Culture in Progress  Received in Microbiology Lab  Culture in Progress       Results from last 7 days   Lab Units 09/17/22  0801   TOTAL COUNTED  100   NEUTROPHIL % (SYNOVIAL) % 93   WBC FLUID /ul 33,768*   RBC, SYNOVIAL  35,000*       ED Treatment:   Medication Administration from 09/16/2022 1650 to 09/16/2022 2301       Date/Time Order Dose Route Action     09/16/2022 1931 sodium chloride 0 9 % infusion 125 mL/hr Intravenous New Bag     09/16/2022 1936 cefepime (MAXIPIME) IVPB (premix in dextrose) 2,000 mg 50 mL 2,000 mg Intravenous New Bag     09/16/2022 2017 vancomycin (VANCOCIN) 2,000 mg in sodium chloride 0 9 % 500 mL IVPB 2,000 mg Intravenous New Bag        Past Medical History:   Diagnosis Date    Anxiety 07/22/2021    Back pain     See Chiro regularly    Class 2 severe obesity with serious comorbidity and body mass index (BMI) of 37 0 to 37 9 in adult [facility])     History of asthma     Symptomatic when smoking  Last used inhaler in 2011   Hypertriglyceridemia     Hypertriglyceridemia     Other hemorrhoids     Other hyperlipidemia     Vitamin D deficiency      Present on Admission:   Hypertriglyceridemia      Admitting Diagnosis: Cellulitis [L03 90]  Knee swelling [M25 469]  Prepatellar bursitis of left knee [M70 42]  Acute pain of left knee [M25 562]  Age/Sex: 27 y o  male  Admission Orders:  See above note  9/17   Scheduled Medications:  cefTRIAXone, 2,000 mg, Intravenous, Q24H  enoxaparin, 40 mg, Subcutaneous, Daily  fish oil, 2,000 mg, Oral, BID  niacin, 500 mg, Oral, BID With Meals      Continuous IV Infusions:  lactated ringers, 125 mL/hr, Intravenous, Continuous      PRN Meds:  acetaminophen, 650 mg, Oral, Q6H PRN  fentaNYL, 50 mcg, Intravenous, Q3 min PRN  HYDROmorphone, 0 5 mg, Intravenous, Q5 Min PRN  lidocaine (PF), 0 5 mL, Infiltration, Once PRN  ondansetron, 4 mg, Intravenous, Once PRN        IP CONSULT TO ORTHOPEDIC SURGERY    Network Utilization Review Department  ATTENTION: Please call with any questions or concerns to [phone] and carefully listen to the prompts so that you are directed to the right person  All voicemails are confidential   Kam Schwab all requests for admission clinical reviews, approved or denied determinations and any other requests to dedicated fax number below belonging to the campus where the patient is receiving treatment   List of dedicated fax numbers for the Facilities:  FACILITY NAME UR FAX NUMBER   ADMISSION DENIALS (Administrative/Medical Necessity) [phone]   [address] (Maternity/NICU/Pediatrics) [address] [facility] [facility] [address] [place] [address]  [phone]   Jake [address]ney [address] [place] [address] [id number] [place] [address] [address] [facility] [place] [address] [phone]

## 2025-04-22 ENCOUNTER — OFFICE VISIT (OUTPATIENT)
Dept: URGENT CARE | Facility: MEDICAL CENTER | Age: 35
End: 2025-04-22
Payer: COMMERCIAL

## 2025-04-22 VITALS
WEIGHT: 315 LBS | OXYGEN SATURATION: 99 % | SYSTOLIC BLOOD PRESSURE: 127 MMHG | HEART RATE: 100 BPM | RESPIRATION RATE: 18 BRPM | DIASTOLIC BLOOD PRESSURE: 84 MMHG | TEMPERATURE: 97.3 F | BODY MASS INDEX: 40.41 KG/M2

## 2025-04-22 DIAGNOSIS — J01.10 ACUTE NON-RECURRENT FRONTAL SINUSITIS: Primary | ICD-10-CM

## 2025-04-22 PROCEDURE — G0383 LEV 4 HOSP TYPE B ED VISIT: HCPCS | Performed by: PHYSICIAN ASSISTANT

## 2025-04-22 RX ORDER — PREDNISONE 20 MG/1
40 TABLET ORAL DAILY
Qty: 10 TABLET | Refills: 0 | Status: SHIPPED | OUTPATIENT
Start: 2025-04-22 | End: 2025-04-27

## 2025-04-22 RX ORDER — LEVOFLOXACIN 500 MG/1
500 TABLET, FILM COATED ORAL EVERY 24 HOURS
Qty: 5 TABLET | Refills: 0 | Status: SHIPPED | OUTPATIENT
Start: 2025-04-22 | End: 2025-04-27

## 2025-04-22 NOTE — PATIENT INSTRUCTIONS
Sinusitis  Prednisone once daily x 5 days  Humidifier in room/ steam from shower  Levaquin once daily to be started if no improvement with prednisone  Follow up with PCP in 3-5 days.  Proceed to  ER if symptoms worsen.

## 2025-04-22 NOTE — PROGRESS NOTES
[facility]        NAME: Praneeth Negrete is a 35 y.o. male  : 1990    MRN: [id number]  DATE: 2025  TIME: 10:21 AM    Assessment and Plan   Acute non-recurrent frontal sinusitis [J01.10]  1. Acute non-recurrent frontal sinusitis  predniSONE 20 mg tablet    levofloxacin (LEVAQUIN) 500 mg tablet            Patient Instructions     Sinusitis  Prednisone once daily x 5 days  Humidifier in room/ steam from shower  Levaquin once daily to be started if no improvement with prednisone  Follow up with PCP in 3-5 days.  Proceed to  ER if symptoms worsen.    Chief Complaint     Chief Complaint   Patient presents with    Sinus Problem     Pt. With sinus pressure and congestion that began three days ago. No fevers. States mucus is green and bloody.          History of Present Illness       35-year-old male who presents complaining of congestion, runny nose, sinus pressure/pain x 4 days.  States that he has been taking over-the-counter medications with no relief.  Denies fever, chills, chest pain, shortness of breath.    Sinus Problem  Associated symptoms include congestion and sinus pressure. Pertinent negatives include no coughing or shortness of breath.       Review of Systems   Review of Systems   Constitutional: Negative.    HENT:  Positive for congestion, rhinorrhea, sinus pressure and sinus pain.    Eyes: Negative.    Respiratory: Negative.  Negative for apnea, cough, choking, chest tightness, shortness of breath, wheezing and stridor.    Cardiovascular: Negative.  Negative for chest pain.         Current Medications       Current Outpatient Medications:     levofloxacin (LEVAQUIN) 500 mg tablet, Take 1 tablet (500 mg total) by mouth every 24 hours for 5 days, Disp: 5 tablet, Rfl: 0    Multiple Vitamin (MULTIVITAMIN) tablet, Take 1 tablet by mouth daily , Disp: , Rfl:     niacin 500 mg tablet, Take 500 mg by mouth 2 (two) times a day with meals, Disp: , Rfl:     Omega-3 Fatty Acids (Fish Oil) 1200  MG CAPS, Take 2 capsules by mouth 2 (two) times a day, Disp: , Rfl:     predniSONE 20 mg tablet, Take 2 tablets (40 mg total) by mouth daily for 5 days, Disp: 10 tablet, Rfl: 0    Cholecalciferol (Vitamin D3) 125 MCG (5000 UT) CAPS, Take 1 capsule by mouth in the morning (Patient not taking: Reported on 4/22/2025), Disp: , Rfl:     Lactobacillus (PROBIOTIC ACIDOPHILUS PO), Take 1 capsule by mouth in the morning (Patient not taking: Reported on 4/22/2025), Disp: , Rfl:     psyllium (METAMUCIL SMOOTH TEXTURE) 28 % packet, Take 1 packet by mouth 2 (two) times a day (Patient not taking: Reported on 3/25/2025), Disp: , Rfl:     Current Allergies     Allergies as of 04/22/2025 - Reviewed 04/22/2025   Allergen Reaction Noted    Amoxicillin Swelling 07/22/2021            The following portions of the patient's history were reviewed and updated as appropriate: allergies, current medications, past family history, past medical history, past social history, past surgical history and problem list.     Past Medical History:   Diagnosis Date    Anxiety 07/22/2021    Back pain     See Chiro regularly    Class 2 severe obesity with serious comorbidity and body mass index (BMI) of 37.0 to 37.9 in adult (HCC)     History of asthma     Symptomatic when smoking.  Last used inhaler in 2011.    Hypertriglyceridemia     EUSEBIA (obstructive sleep apnea) 05/15/2023    Mild    Other hemorrhoids     Other hyperlipidemia     Vitamin D deficiency        Past Surgical History:   Procedure Laterality Date    COLONOSCOPY  2016    +Hemorrhoids    INCISION AND DRAINAGE OF WOUND Left 9/17/2022    Procedure: INCISION AND DRAINAGE (I&D) EXTREMITY prepatella brusa;  Surgeon: Ken Aviles DO;  Location: [facility];  Service: Orthopedics       Family History   Problem Relation Age of Onset    Hyperlipidemia Mother     Restless legs syndrome Mother     No Known Problems Father     ADD / ADHD Brother     No Known Problems Son          Medications have been  verified.        Objective   /84   Pulse 100   Temp (!) 97.3 °F (36.3 °C)   Resp 18   Wt (!) 151 kg (332 lb)   SpO2 99%   BMI 40.41 kg/m²        Physical Exam     Physical Exam  Constitutional:       General: He is not in acute distress.     Appearance: Normal appearance. He is well-developed. He is not diaphoretic.   HENT:      Head: Normocephalic and atraumatic.      Right Ear: Hearing, tympanic membrane, ear canal and external ear normal.      Left Ear: Hearing, tympanic membrane, ear canal and external ear normal.      Nose: Rhinorrhea present.      Right Sinus: Maxillary sinus tenderness present. No frontal sinus tenderness.      Left Sinus: Maxillary sinus tenderness present. No frontal sinus tenderness.      Mouth/Throat:      Mouth: Mucous membranes are moist.      Pharynx: Oropharynx is clear. Uvula midline. No oropharyngeal exudate or posterior oropharyngeal erythema.   Cardiovascular:      Rate and Rhythm: Normal rate and regular rhythm.      Heart sounds: Normal heart sounds.   Pulmonary:      Effort: Pulmonary effort is normal. No respiratory distress.      Breath sounds: Normal breath sounds. No stridor. No wheezing, rhonchi or rales.   Chest:      Chest wall: No tenderness.   Musculoskeletal:      Cervical back: Normal range of motion and neck supple.   Lymphadenopathy:      Cervical: No cervical adenopathy.   Neurological:      Mental Status: He is alert.

## 2025-06-02 ENCOUNTER — OFFICE VISIT (OUTPATIENT)
Dept: URGENT CARE | Facility: MEDICAL CENTER | Age: 35
End: 2025-06-02
Payer: COMMERCIAL

## 2025-06-02 VITALS
OXYGEN SATURATION: 99 % | DIASTOLIC BLOOD PRESSURE: 82 MMHG | TEMPERATURE: 98.7 F | SYSTOLIC BLOOD PRESSURE: 124 MMHG | RESPIRATION RATE: 18 BRPM

## 2025-06-02 DIAGNOSIS — R09.81 SINUS CONGESTION: Primary | ICD-10-CM

## 2025-06-02 PROCEDURE — G0383 LEV 4 HOSP TYPE B ED VISIT: HCPCS

## 2025-06-02 RX ORDER — PREDNISONE 20 MG/1
40 TABLET ORAL DAILY
Qty: 10 TABLET | Refills: 0 | Status: SHIPPED | OUTPATIENT
Start: 2025-06-02 | End: 2025-06-07

## 2025-06-02 RX ORDER — FLUTICASONE PROPIONATE 50 MCG
1 SPRAY, SUSPENSION (ML) NASAL DAILY
Qty: 11.1 ML | Refills: 0 | Status: SHIPPED | OUTPATIENT
Start: 2025-06-02

## 2025-06-02 NOTE — PATIENT INSTRUCTIONS
Sinus congestion  Prednisone once daily x 5 days  Flonase 1 spray each nostril daily  Patient referred to ENT states his wife has an ENT person and will follow-up with him  Follow up with PCP in 3-5 days.  Proceed to  ER if symptoms worsen.

## 2025-06-02 NOTE — PROGRESS NOTES
[facility]        NAME: Praneeth Negrete is a 35 y.o. male  : 1990    MRN: [id number]  DATE: 2025  TIME: 8:49 AM    Assessment and Plan   Sinus congestion [R09.81]  1. Sinus congestion  predniSONE 20 mg tablet    fluticasone (FLONASE) 50 mcg/act nasal spray            Patient Instructions     Sinus congestion  Prednisone once daily x 5 days  Flonase 1 spray each nostril daily  Patient referred to ENT states his wife has an ENT person and will follow-up with him  Follow up with PCP in 3-5 days.  Proceed to  ER if symptoms worsen.    Chief Complaint     Chief Complaint   Patient presents with    Sinusitis     C/o recurrent sinus issues x 3 months. States eye have been glued shut in the mornings          History of Present Illness       35-year-old male who presents complaining of sinus congestion, sinus pressure, runny nose, postnasal drip, swollen lymph nodes on and off x 3 months.  States that he feels better for a week and then the symptoms returned..  Patient states that he was seen and treated for sinusitis recently.  Has been taking over-the-counter medications with minimal relief.  Denies fever, chills, chest pain, shortness of breath.    Sinusitis  Associated symptoms include congestion and sinus pressure. Pertinent negatives include no coughing or shortness of breath.       Review of Systems   Review of Systems   Constitutional: Negative.  Negative for fever.   HENT:  Positive for congestion, postnasal drip, rhinorrhea, sinus pressure and sinus pain.    Eyes: Negative.    Respiratory: Negative.  Negative for apnea, cough, choking, chest tightness, shortness of breath, wheezing and stridor.    Cardiovascular: Negative.  Negative for chest pain.         Current Medications     Current Medications[1]    Current Allergies     Allergies as of 2025 - Reviewed 2025   Allergen Reaction Noted    Amoxicillin Swelling 2021            The following portions of the patient's  history were reviewed and updated as appropriate: allergies, current medications, past family history, past medical history, past social history, past surgical history and problem list.     Past Medical History[2]    Past Surgical History[3]    Family History[4]      Medications have been verified.        Objective   /82   Temp 98.7 °F (37.1 °C)   Resp 18   SpO2 99%        Physical Exam     Physical Exam  Constitutional:       General: He is not in acute distress.     Appearance: Normal appearance. He is well-developed. He is not diaphoretic.   HENT:      Head: Normocephalic and atraumatic.      Right Ear: Hearing, tympanic membrane, ear canal and external ear normal.      Left Ear: Hearing, tympanic membrane, ear canal and external ear normal.      Nose: Rhinorrhea present. No congestion.      Right Sinus: No maxillary sinus tenderness or frontal sinus tenderness.      Left Sinus: No maxillary sinus tenderness or frontal sinus tenderness.      Mouth/Throat:      Mouth: Mucous membranes are moist.      Pharynx: Oropharynx is clear. Uvula midline. No oropharyngeal exudate or posterior oropharyngeal erythema.     Eyes:      General:         Right eye: No discharge.         Left eye: No discharge.      Extraocular Movements: Extraocular movements intact.      Conjunctiva/sclera: Conjunctivae normal.      Pupils: Pupils are equal, round, and reactive to light.       Cardiovascular:      Rate and Rhythm: Normal rate and regular rhythm.      Heart sounds: Normal heart sounds.   Pulmonary:      Effort: Pulmonary effort is normal. No respiratory distress.      Breath sounds: Normal breath sounds. No stridor. No wheezing, rhonchi or rales.   Chest:      Chest wall: No tenderness.     Musculoskeletal:      Cervical back: Normal range of motion and neck supple.   Lymphadenopathy:      Cervical: No cervical adenopathy.     Neurological:      Mental Status: He is alert.                          [1]   Current Outpatient  Medications:     fluticasone (FLONASE) 50 mcg/act nasal spray, 1 spray into each nostril daily, Disp: 11.1 mL, Rfl: 0    Multiple Vitamin (MULTIVITAMIN) tablet, Take 1 tablet by mouth in the morning., Disp: , Rfl:     niacin 500 mg tablet, Take 500 mg by mouth in the morning and 500 mg in the evening. Take with meals., Disp: , Rfl:     Omega-3 Fatty Acids (Fish Oil) 1200 MG CAPS, Take 2 capsules by mouth in the morning and 2 capsules in the evening., Disp: , Rfl:     predniSONE 20 mg tablet, Take 2 tablets (40 mg total) by mouth daily for 5 days, Disp: 10 tablet, Rfl: 0    Cholecalciferol (Vitamin D3) 125 MCG (5000 UT) CAPS, Take 1 capsule by mouth in the morning (Patient not taking: Reported on 4/22/2025), Disp: , Rfl:     Lactobacillus (PROBIOTIC ACIDOPHILUS PO), Take 1 capsule by mouth in the morning (Patient not taking: Reported on 4/22/2025), Disp: , Rfl:     psyllium (METAMUCIL SMOOTH TEXTURE) 28 % packet, Take 1 packet by mouth 2 (two) times a day (Patient not taking: Reported on 3/25/2025), Disp: , Rfl:   [2]   Past Medical History:  Diagnosis Date    Anxiety 07/22/2021    Back pain     See Chiro regularly    Class 2 severe obesity with serious comorbidity and body mass index (BMI) of 37.0 to 37.9 in adult (HCC)     History of asthma     Symptomatic when smoking.  Last used inhaler in 2011.    Hypertriglyceridemia     EUSEBIA (obstructive sleep apnea) 05/15/2023    Mild    Other hemorrhoids     Other hyperlipidemia     Vitamin D deficiency    [3]   Past Surgical History:  Procedure Laterality Date    COLONOSCOPY  2016    +Hemorrhoids    INCISION AND DRAINAGE OF WOUND Left 9/17/2022    Procedure: INCISION AND DRAINAGE (I&D) EXTREMITY prepatella bru;  Surgeon: Ken Aviles DO;  Location: [facility];  Service: Orthopedics   [4]   Family History  Problem Relation Name Age of Onset    Hyperlipidemia Mother      Restless legs syndrome Mother      No Known Problems Father      ADD / ADHD Brother Mayur     No Known  Problems Maik Dacosta Jr.

## 2025-06-10 ENCOUNTER — APPOINTMENT (OUTPATIENT)
Dept: URGENT CARE | Age: 35
End: 2025-06-10

## 2025-06-16 ENCOUNTER — OFFICE VISIT (OUTPATIENT)
Age: 35
End: 2025-06-16
Payer: COMMERCIAL

## 2025-06-16 VITALS
WEIGHT: 315 LBS | HEIGHT: 76 IN | DIASTOLIC BLOOD PRESSURE: 84 MMHG | SYSTOLIC BLOOD PRESSURE: 120 MMHG | BODY MASS INDEX: 38.36 KG/M2 | HEART RATE: 83 BPM | OXYGEN SATURATION: 95 %

## 2025-06-16 DIAGNOSIS — G47.33 OSA (OBSTRUCTIVE SLEEP APNEA): Primary | ICD-10-CM

## 2025-06-16 DIAGNOSIS — E66.813 CLASS 3 SEVERE OBESITY DUE TO EXCESS CALORIES WITHOUT SERIOUS COMORBIDITY WITH BODY MASS INDEX (BMI) OF 40.0 TO 44.9 IN ADULT: ICD-10-CM

## 2025-06-16 PROCEDURE — 99214 OFFICE O/P EST MOD 30 MIN: CPT | Performed by: INTERNAL MEDICINE

## 2025-06-16 NOTE — PROGRESS NOTES
Name: Praneeth Negrete      : 1990      MRN: [id number]  Encounter Provider: Arnaldo Siddiqui MD  Encounter Date: 2025   Encounter department: [facility]    :  Assessment & Plan  EUSEBIA (obstructive sleep apnea)  HST NIDIA 10.9    Compliance from  - 6/10/2025  More than 4 hours 97%, 5 hours and 32 minutes  On APAP 4-20  95 percentile pressure 11.3  Median leak 0.6  Residual AHI 1.9    Patient has good compliance    Counseled patient on increasing usage to at least 7 hours and using it every night    Plan  Follow-up in 1 year  Ordered supplies with [facility]  Orders:    PAP DME Resupply/Reorder    Class 3 severe obesity due to excess calories without serious comorbidity with body mass index (BMI) of 40.0 to 44.9 in adult  Counseled patient on lifestyle modifications including diet and exercise  Explained that weight loss will decrease the severity of sleep apnea  Recommended he speak with his primary care physician and weight management regarding prescribing Zepbound for obstructive sleep apnea  Orders:    Ambulatory Referral to Weight Management; Future      History of Present Illness     Praneeth Negrete 35-year-old male with past medical history of hyperlipidemia, vitamin D deficiency who presents for follow-up of mild obstructive sleep apnea.    Previous office visit  He has been gaining weight.  He works third shift.  On the weekdays and workdays he sleeps from anywhere from 9:30 AM to 2 PM.  On weekends he sleeps from 9 PM to 1 AM.  He is getting anywhere from 4 to 6 hours of sleep.    He was initially diagnosed with mild obstructive sleep apnea.  He initially presented with heavy snoring.  HST showed an NIDIA of 10.9 events per hour with oxygen giuseppe 83%.    Patient has moderate compliance.  He states that his sleep schedule is highly variable due to his work and also taking care of the kids.  He plans to switch to dayshif in the future.t he reports dry nose and dry mouth.  Reports bothersome  air leaks.  Current Lamont score of 6.      Current office visit  His compliance has improved.  However he averages 5.5 hours of usage per night.  He does note improvement in symptoms with use. He is still working night shift. He is attempting to get a job with day shift. He has gained weight since last office visit.  He sleeps 6 to 8 hours on the weekends when he is not working.  He has no difficulty falling asleep.  He is unable to sleep more due to his work schedule and taking care of his children.                  Sitting and reading: Moderate chance of dozing  Watching TV: Moderate chance of dozing  Sitting, inactive in a public place (e.g. a theatre or a meeting): Would never doze  As a passenger in a car for an hour without a break: Would never doze  Lying down to rest in the afternoon when circumstances permit: High chance of dozing  Sitting and talking to someone: Would never doze  Sitting quietly after a lunch without alcohol: Slight chance of dozing  In a car, while stopped for a few minutes in traffic: Would never doze  Total score: 8       Review of Systems   Constitutional: Negative.    HENT: Negative.     Eyes: Negative.    Respiratory: Negative.     Cardiovascular: Negative.    Gastrointestinal: Negative.    Endocrine: Negative.    Genitourinary: Negative.    Musculoskeletal: Negative.    Skin: Negative.    Allergic/Immunologic: Negative.    Neurological: Negative.    Hematological: Negative.    Psychiatric/Behavioral: Negative.       Pertinent positives/negatives included in HPI and also as noted:       Pertinent Medical History           Medical History Reviewed by provider this encounter:     .  Past Medical History   Past Medical History[1]  Past Surgical History[2]  Family History[3]   reports that he quit smoking about 4 years ago. His smoking use included cigarettes. He started smoking about 25 years ago. He has a 11 pack-year smoking history. He quit smokeless tobacco use about 4 years ago.   "His smokeless tobacco use included chew. He reports current alcohol use of about 1.0 standard drink of alcohol per week. He reports that he does not currently use drugs after having used the following drugs: Marijuana.  Current Outpatient Medications   Medication Instructions    Cholecalciferol (Vitamin D3) 125 MCG (5000 UT) CAPS 1 capsule, Daily    fluticasone (FLONASE) 50 mcg/act nasal spray 1 spray, Nasal, Daily    Lactobacillus (PROBIOTIC ACIDOPHILUS PO) 1 capsule, Daily    Multiple Vitamin (MULTIVITAMIN) tablet 1 tablet, Daily    niacin 500 mg, 2 times daily with meals    Omega-3 Fatty Acids (Fish Oil) 1200 MG CAPS 2 capsules, 2 times daily    psyllium (METAMUCIL SMOOTH TEXTURE) 28 % packet 1 packet, 2 times daily   Allergies[4]   Medications Ordered Prior to Encounter[5]   Social History[6]  Objective   /84   Pulse 83   Ht 6' 4\" (1.93 m)   Wt (!) 154 kg (340 lb)   SpO2 95%   BMI 41.39 kg/m²        Physical Exam  Vitals reviewed.   HENT:      Head: Normocephalic and atraumatic.      Nose: Nose normal.      Mouth/Throat:      Mouth: Mucous membranes are moist.     Eyes:      Extraocular Movements: Extraocular movements intact.      Pupils: Pupils are equal, round, and reactive to light.       Cardiovascular:      Rate and Rhythm: Normal rate and regular rhythm.      Pulses: Normal pulses.   Pulmonary:      Effort: Pulmonary effort is normal.      Breath sounds: Normal breath sounds.   Abdominal:      General: Abdomen is flat. Bowel sounds are normal.      Palpations: Abdomen is soft.     Musculoskeletal:         General: Normal range of motion.      Cervical back: Normal range of motion.     Skin:     General: Skin is warm.     Neurological:      Mental Status: He is alert. Mental status is at baseline.     Psychiatric:         Mood and Affect: Mood normal.       Visit Vitals  /84   Pulse 83   Ht 6' 4\" (1.93 m)   Wt (!) 154 kg (340 lb)   SpO2 95%   BMI 41.39 kg/m²   Smoking Status Former   BSA " "2.77 m²           Data  Lab Results   Component Value Date    HGB 14.5 03/13/2023    HCT 43.5 03/13/2023    MCV 90 03/13/2023      Lab Results   Component Value Date    CALCIUM 9.1 03/13/2023    K 4.6 03/13/2023    CO2 27 03/13/2023     03/13/2023    BUN 12 03/13/2023    CREATININE 0.90 03/13/2023     No results found for: \"IRON\", \"TIBC\", \"FERRITIN\"  Lab Results   Component Value Date    AST 22 03/13/2023    ALT 38 03/13/2023                  [1]   Past Medical History:  Diagnosis Date    Anxiety 07/22/2021    Back pain     See Chiro regularly    Class 2 severe obesity with serious comorbidity and body mass index (BMI) of 37.0 to 37.9 in adult (HCC)     History of asthma     Symptomatic when smoking.  Last used inhaler in 2011.    Hypertriglyceridemia     EUSEBIA (obstructive sleep apnea) 05/15/2023    Mild    Other hemorrhoids     Other hyperlipidemia     Vitamin D deficiency    [2]   Past Surgical History:  Procedure Laterality Date    COLONOSCOPY  2016    +Hemorrhoids    INCISION AND DRAINAGE OF WOUND Left 9/17/2022    Procedure: INCISION AND DRAINAGE (I&D) EXTREMITY prepatella brusa;  Surgeon: Ken Aviles DO;  Location: [facility];  Service: Orthopedics   [3]   Family History  Problem Relation Name Age of Onset    Hyperlipidemia Mother      Restless legs syndrome Mother      No Known Problems Father      ADD / ADHD Brother Mayur     No Known Problems Son Pablo Dacosta Jr.    [4]   Allergies  Allergen Reactions    Amoxicillin Swelling     Swollen taste buds, taste alteration, increased temperature sensitivity   [5]   Current Outpatient Medications on File Prior to Visit   Medication Sig Dispense Refill    Cholecalciferol (Vitamin D3) 125 MCG (5000 UT) CAPS Take 1 capsule by mouth in the morning      fluticasone (FLONASE) 50 mcg/act nasal spray 1 spray into each nostril daily 11.1 mL 0    Multiple Vitamin (MULTIVITAMIN) tablet Take 1 tablet by mouth in the morning.      Omega-3 Fatty Acids (Fish Oil) 1200 MG " CAPS Take 2 capsules by mouth in the morning and 2 capsules in the evening.      Lactobacillus (PROBIOTIC ACIDOPHILUS PO) Take 1 capsule by mouth in the morning (Patient not taking: Reported on 2025)      niacin 500 mg tablet Take 500 mg by mouth in the morning and 500 mg in the evening. Take with meals. (Patient not taking: Reported on 2025)      psyllium (METAMUCIL SMOOTH TEXTURE) 28 % packet Take 1 packet by mouth 2 (two) times a day (Patient not taking: Reported on 3/25/2025)       No current facility-administered medications on file prior to visit.   [6]   Social History  Tobacco Use    Smoking status: Former     Current packs/day: 0.00     Average packs/day: 0.5 packs/day for 22.0 years (11.0 ttl pk-yrs)     Types: Cigarettes     Start date: 1999     Quit date: 3/22/2021     Years since quittin.2    Smokeless tobacco: Former     Types: Chew     Quit date: 10/1/2020   Vaping Use    Vaping status: Former    Start date: 2013    Quit date: 3/10/2021    Substances: Nicotine, CBD, Flavoring   Substance and Sexual Activity    Alcohol use: Yes     Alcohol/week: 1.0 standard drink of alcohol     Types: 1 Cans of beer per week    Drug use: Not Currently     Types: Marijuana     Comment: Last use     Sexual activity: Yes     Partners: Female     Birth control/protection: OCP

## 2025-06-16 NOTE — ASSESSMENT & PLAN NOTE
HST NIDIA 10.9    Compliance from 5/12 - 6/10/2025  More than 4 hours 97%, 5 hours and 32 minutes  On APAP 4-20  95 percentile pressure 11.3  Median leak 0.6  Residual AHI 1.9    Patient has good compliance    Counseled patient on increasing usage to at least 7 hours and using it every night    Plan  Follow-up in 1 year  Ordered supplies with [facility]  Orders:    PAP DME Resupply/Reorder

## 2025-06-16 NOTE — ASSESSMENT & PLAN NOTE
Counseled patient on lifestyle modifications including diet and exercise  Explained that weight loss will decrease the severity of sleep apnea  Recommended he speak with his primary care physician and weight management regarding prescribing Zepbound for obstructive sleep apnea  Orders:    Ambulatory Referral to Weight Management; Future

## 2025-06-20 ENCOUNTER — TELEPHONE (OUTPATIENT)
Dept: BARIATRICS | Facility: CLINIC | Age: 35
End: 2025-06-20

## 2025-06-20 ENCOUNTER — CLINICAL SUPPORT (OUTPATIENT)
Dept: BARIATRICS | Facility: CLINIC | Age: 35
End: 2025-06-20
Attending: INTERNAL MEDICINE

## 2025-06-20 VITALS — WEIGHT: 315 LBS | HEIGHT: 76 IN | BODY MASS INDEX: 38.36 KG/M2

## 2025-06-20 DIAGNOSIS — E66.813 CLASS 3 SEVERE OBESITY DUE TO EXCESS CALORIES WITHOUT SERIOUS COMORBIDITY WITH BODY MASS INDEX (BMI) OF 40.0 TO 44.9 IN ADULT: ICD-10-CM

## 2025-06-20 DIAGNOSIS — R63.5 ABNORMAL WEIGHT GAIN: Primary | ICD-10-CM

## 2025-06-20 PROCEDURE — WMDI30

## 2025-06-20 PROCEDURE — RECHECK

## (undated) DEVICE — OCCLUSIVE GAUZE STRIP,3% BISMUTH TRIBROMOPHENATE IN PETROLATUM BLEND: Brand: XEROFORM

## (undated) DEVICE — PADDING CAST 4 IN  COTTON STRL

## (undated) DEVICE — MEDI-VAC YANK SUCT HNDL W/TPRD BULBOUS TIP: Brand: CARDINAL HEALTH

## (undated) DEVICE — SPONGE PVP SCRUB WING STERILE

## (undated) DEVICE — SUT PROLENE 2-0 CT-2 30 IN 8411H

## (undated) DEVICE — PAD GROUNDING ADULT

## (undated) DEVICE — IMPERVIOUS STOCKINETTE: Brand: DEROYAL

## (undated) DEVICE — CHLORAPREP HI-LITE 26ML ORANGE

## (undated) DEVICE — GLOVE INDICATOR PI UNDERGLOVE SZ 8 BLUE

## (undated) DEVICE — INTENDED FOR TISSUE SEPARATION, AND OTHER PROCEDURES THAT REQUIRE A SHARP SURGICAL BLADE TO PUNCTURE OR CUT.: Brand: BARD-PARKER SAFETY BLADES SIZE 10, STERILE

## (undated) DEVICE — BETHLEHEM UNIVERSAL  MIONR EXT: Brand: CARDINAL HEALTH

## (undated) DEVICE — GLOVE SRG BIOGEL ECLIPSE 8

## (undated) DEVICE — TUBING SUCTION 5MM X 12 FT

## (undated) DEVICE — ABDOMINAL PAD: Brand: DERMACEA

## (undated) DEVICE — ACE WRAP 4 IN UNSTERILE

## (undated) DEVICE — GAUZE SPONGES,16 PLY: Brand: CURITY

## (undated) DEVICE — IODOFORM PACKING STRIP: Brand: CURITY

## (undated) DEVICE — PENCIL ELECTROSURG E-Z CLEAN -0035H

## (undated) DEVICE — SUT PROLENE 0 CT-1 30 IN 8424H